# Patient Record
Sex: FEMALE | Race: WHITE | HISPANIC OR LATINO | Employment: OTHER | URBAN - METROPOLITAN AREA
[De-identification: names, ages, dates, MRNs, and addresses within clinical notes are randomized per-mention and may not be internally consistent; named-entity substitution may affect disease eponyms.]

---

## 2017-03-16 ENCOUNTER — APPOINTMENT (EMERGENCY)
Dept: RADIOLOGY | Facility: HOSPITAL | Age: 57
DRG: 113 | End: 2017-03-16
Payer: COMMERCIAL

## 2017-03-16 ENCOUNTER — HOSPITAL ENCOUNTER (INPATIENT)
Facility: HOSPITAL | Age: 57
LOS: 1 days | Discharge: LEFT BEFORE DISCHARGE | DRG: 113 | End: 2017-03-18
Attending: EMERGENCY MEDICINE | Admitting: INTERNAL MEDICINE
Payer: COMMERCIAL

## 2017-03-16 DIAGNOSIS — J06.9 VIRAL URI: Primary | ICD-10-CM

## 2017-03-16 DIAGNOSIS — R65.10 SIRS (SYSTEMIC INFLAMMATORY RESPONSE SYNDROME) (HCC): ICD-10-CM

## 2017-03-16 PROBLEM — G35 MS (MULTIPLE SCLEROSIS) (HCC): Status: ACTIVE | Noted: 2017-03-16

## 2017-03-16 PROBLEM — G43.909 MIGRAINE: Status: ACTIVE | Noted: 2017-03-16

## 2017-03-16 PROBLEM — R68.89 FLU-LIKE SYMPTOMS: Status: ACTIVE | Noted: 2017-03-16

## 2017-03-16 PROBLEM — J45.909 ASTHMA: Status: ACTIVE | Noted: 2017-03-16

## 2017-03-16 LAB
ANION GAP SERPL CALCULATED.3IONS-SCNC: 10 MMOL/L (ref 4–13)
ANION GAP SERPL CALCULATED.3IONS-SCNC: 7 MMOL/L (ref 4–13)
ATRIAL RATE: 99 BPM
BACTERIA UR QL AUTO: ABNORMAL /HPF
BASOPHILS # BLD AUTO: 0.01 THOUSANDS/ΜL (ref 0–0.1)
BASOPHILS NFR BLD AUTO: 0 % (ref 0–1)
BILIRUB UR QL STRIP: NEGATIVE
BUN SERPL-MCNC: 12 MG/DL (ref 5–25)
BUN SERPL-MCNC: 8 MG/DL (ref 5–25)
CALCIUM SERPL-MCNC: 7.9 MG/DL (ref 8.3–10.1)
CALCIUM SERPL-MCNC: 8.2 MG/DL (ref 8.3–10.1)
CHLORIDE SERPL-SCNC: 104 MMOL/L (ref 100–108)
CHLORIDE SERPL-SCNC: 112 MMOL/L (ref 100–108)
CLARITY UR: CLEAR
CO2 SERPL-SCNC: 24 MMOL/L (ref 21–32)
CO2 SERPL-SCNC: 25 MMOL/L (ref 21–32)
COLOR UR: YELLOW
COLOR, POC: YELLOW
CREAT SERPL-MCNC: 0.75 MG/DL (ref 0.6–1.3)
CREAT SERPL-MCNC: 0.84 MG/DL (ref 0.6–1.3)
EOSINOPHIL # BLD AUTO: 0.13 THOUSAND/ΜL (ref 0–0.61)
EOSINOPHIL NFR BLD AUTO: 2 % (ref 0–6)
ERYTHROCYTE [DISTWIDTH] IN BLOOD BY AUTOMATED COUNT: 12.9 % (ref 11.6–15.1)
ERYTHROCYTE [DISTWIDTH] IN BLOOD BY AUTOMATED COUNT: 13.1 % (ref 11.6–15.1)
FLUAV AG SPEC QL IA: NEGATIVE
FLUAV AG SPEC QL: DETECTED
FLUBV AG SPEC QL IA: NEGATIVE
FLUBV AG SPEC QL: ABNORMAL
GFR SERPL CREATININE-BSD FRML MDRD: >60 ML/MIN/1.73SQ M
GFR SERPL CREATININE-BSD FRML MDRD: >60 ML/MIN/1.73SQ M
GLUCOSE SERPL-MCNC: 106 MG/DL (ref 65–140)
GLUCOSE SERPL-MCNC: 115 MG/DL (ref 65–140)
GLUCOSE UR STRIP-MCNC: NEGATIVE MG/DL
HCT VFR BLD AUTO: 35.3 % (ref 34.8–46.1)
HCT VFR BLD AUTO: 40.8 % (ref 34.8–46.1)
HGB BLD-MCNC: 11.5 G/DL (ref 11.5–15.4)
HGB BLD-MCNC: 13.7 G/DL (ref 11.5–15.4)
HGB UR QL STRIP.AUTO: ABNORMAL
HYALINE CASTS #/AREA URNS LPF: ABNORMAL /LPF
KETONES UR STRIP-MCNC: NEGATIVE MG/DL
LACTATE SERPL-SCNC: 1.7 MMOL/L (ref 0.5–2)
LACTATE SERPL-SCNC: 2 MMOL/L (ref 0.5–2)
LEUKOCYTE ESTERASE UR QL STRIP: NEGATIVE
LYMPHOCYTES # BLD AUTO: 0.72 THOUSANDS/ΜL (ref 0.6–4.47)
LYMPHOCYTES NFR BLD AUTO: 8 % (ref 14–44)
MCH RBC QN AUTO: 28.1 PG (ref 26.8–34.3)
MCH RBC QN AUTO: 29 PG (ref 26.8–34.3)
MCHC RBC AUTO-ENTMCNC: 32.6 G/DL (ref 31.4–37.4)
MCHC RBC AUTO-ENTMCNC: 33.6 G/DL (ref 31.4–37.4)
MCV RBC AUTO: 86 FL (ref 82–98)
MCV RBC AUTO: 86 FL (ref 82–98)
MONOCYTES # BLD AUTO: 0.65 THOUSAND/ΜL (ref 0.17–1.22)
MONOCYTES NFR BLD AUTO: 7 % (ref 4–12)
NEUTROPHILS # BLD AUTO: 7.42 THOUSANDS/ΜL (ref 1.85–7.62)
NEUTS SEG NFR BLD AUTO: 83 % (ref 43–75)
NITRITE UR QL STRIP: NEGATIVE
NON-SQ EPI CELLS URNS QL MICRO: ABNORMAL /HPF
NRBC BLD AUTO-RTO: 0 /100 WBCS
P AXIS: 19 DEGREES
PH UR STRIP.AUTO: 6 [PH] (ref 4.5–8)
PLATELET # BLD AUTO: 156 THOUSANDS/UL (ref 149–390)
PLATELET # BLD AUTO: 228 THOUSANDS/UL (ref 149–390)
PMV BLD AUTO: 10.1 FL (ref 8.9–12.7)
PMV BLD AUTO: 9.6 FL (ref 8.9–12.7)
POTASSIUM SERPL-SCNC: 3.4 MMOL/L (ref 3.5–5.3)
POTASSIUM SERPL-SCNC: 3.8 MMOL/L (ref 3.5–5.3)
PR INTERVAL: 134 MS
PROT UR STRIP-MCNC: NEGATIVE MG/DL
QRS AXIS: 21 DEGREES
QRSD INTERVAL: 72 MS
QT INTERVAL: 338 MS
QTC INTERVAL: 433 MS
RBC # BLD AUTO: 4.09 MILLION/UL (ref 3.81–5.12)
RBC # BLD AUTO: 4.73 MILLION/UL (ref 3.81–5.12)
RBC #/AREA URNS AUTO: ABNORMAL /HPF
RSV B RNA SPEC QL NAA+PROBE: ABNORMAL
SODIUM SERPL-SCNC: 139 MMOL/L (ref 136–145)
SODIUM SERPL-SCNC: 143 MMOL/L (ref 136–145)
SP GR UR STRIP.AUTO: 1.02 (ref 1–1.03)
SPECIMEN SOURCE: NORMAL
T WAVE AXIS: 26 DEGREES
TROPONIN I BLD-MCNC: 0.01 NG/ML (ref 0–0.08)
UROBILINOGEN UR QL STRIP.AUTO: 0.2 E.U./DL
VENTRICULAR RATE: 99 BPM
WBC # BLD AUTO: 5.78 THOUSAND/UL (ref 4.31–10.16)
WBC # BLD AUTO: 8.95 THOUSAND/UL (ref 4.31–10.16)
WBC #/AREA URNS AUTO: ABNORMAL /HPF

## 2017-03-16 PROCEDURE — 87040 BLOOD CULTURE FOR BACTERIA: CPT | Performed by: EMERGENCY MEDICINE

## 2017-03-16 PROCEDURE — 96361 HYDRATE IV INFUSION ADD-ON: CPT

## 2017-03-16 PROCEDURE — 85027 COMPLETE CBC AUTOMATED: CPT | Performed by: HOSPITALIST

## 2017-03-16 PROCEDURE — 99285 EMERGENCY DEPT VISIT HI MDM: CPT

## 2017-03-16 PROCEDURE — 71020 HB CHEST X-RAY 2VW FRONTAL&LATL: CPT

## 2017-03-16 PROCEDURE — 83605 ASSAY OF LACTIC ACID: CPT | Performed by: EMERGENCY MEDICINE

## 2017-03-16 PROCEDURE — 87798 DETECT AGENT NOS DNA AMP: CPT | Performed by: EMERGENCY MEDICINE

## 2017-03-16 PROCEDURE — 96365 THER/PROPH/DIAG IV INF INIT: CPT

## 2017-03-16 PROCEDURE — 87400 INFLUENZA A/B EACH AG IA: CPT | Performed by: EMERGENCY MEDICINE

## 2017-03-16 PROCEDURE — 36415 COLL VENOUS BLD VENIPUNCTURE: CPT | Performed by: EMERGENCY MEDICINE

## 2017-03-16 PROCEDURE — 80048 BASIC METABOLIC PNL TOTAL CA: CPT | Performed by: HOSPITALIST

## 2017-03-16 PROCEDURE — 94760 N-INVAS EAR/PLS OXIMETRY 1: CPT | Performed by: SOCIAL WORKER

## 2017-03-16 PROCEDURE — 96375 TX/PRO/DX INJ NEW DRUG ADDON: CPT

## 2017-03-16 PROCEDURE — 81002 URINALYSIS NONAUTO W/O SCOPE: CPT | Performed by: EMERGENCY MEDICINE

## 2017-03-16 PROCEDURE — 81001 URINALYSIS AUTO W/SCOPE: CPT

## 2017-03-16 PROCEDURE — 84484 ASSAY OF TROPONIN QUANT: CPT

## 2017-03-16 PROCEDURE — 87086 URINE CULTURE/COLONY COUNT: CPT

## 2017-03-16 PROCEDURE — 93005 ELECTROCARDIOGRAM TRACING: CPT | Performed by: EMERGENCY MEDICINE

## 2017-03-16 PROCEDURE — 80048 BASIC METABOLIC PNL TOTAL CA: CPT | Performed by: EMERGENCY MEDICINE

## 2017-03-16 PROCEDURE — 85025 COMPLETE CBC W/AUTO DIFF WBC: CPT | Performed by: EMERGENCY MEDICINE

## 2017-03-16 RX ORDER — ALBUTEROL SULFATE 90 UG/1
2 AEROSOL, METERED RESPIRATORY (INHALATION) EVERY 4 HOURS PRN
Status: DISCONTINUED | OUTPATIENT
Start: 2017-03-16 | End: 2017-03-18 | Stop reason: HOSPADM

## 2017-03-16 RX ORDER — ONDANSETRON 2 MG/ML
4 INJECTION INTRAMUSCULAR; INTRAVENOUS EVERY 6 HOURS PRN
Status: DISCONTINUED | OUTPATIENT
Start: 2017-03-16 | End: 2017-03-18 | Stop reason: HOSPADM

## 2017-03-16 RX ORDER — KETOROLAC TROMETHAMINE 30 MG/ML
15 INJECTION, SOLUTION INTRAMUSCULAR; INTRAVENOUS ONCE
Status: COMPLETED | OUTPATIENT
Start: 2017-03-16 | End: 2017-03-16

## 2017-03-16 RX ORDER — DOCUSATE SODIUM 100 MG/1
100 CAPSULE, LIQUID FILLED ORAL 2 TIMES DAILY
Status: DISCONTINUED | OUTPATIENT
Start: 2017-03-16 | End: 2017-03-18 | Stop reason: HOSPADM

## 2017-03-16 RX ORDER — BUDESONIDE AND FORMOTEROL FUMARATE DIHYDRATE 160; 4.5 UG/1; UG/1
2 AEROSOL RESPIRATORY (INHALATION) 2 TIMES DAILY
Status: DISCONTINUED | OUTPATIENT
Start: 2017-03-16 | End: 2017-03-18 | Stop reason: HOSPADM

## 2017-03-16 RX ORDER — FLUTICASONE PROPIONATE 50 MCG
1 SPRAY, SUSPENSION (ML) NASAL DAILY
Status: DISCONTINUED | OUTPATIENT
Start: 2017-03-16 | End: 2017-03-18 | Stop reason: HOSPADM

## 2017-03-16 RX ORDER — OSELTAMIVIR PHOSPHATE 75 MG/1
75 CAPSULE ORAL EVERY 12 HOURS SCHEDULED
Status: DISCONTINUED | OUTPATIENT
Start: 2017-03-16 | End: 2017-03-18 | Stop reason: HOSPADM

## 2017-03-16 RX ORDER — NAPROXEN 500 MG/1
500 TABLET ORAL 2 TIMES DAILY WITH MEALS
Status: DISCONTINUED | OUTPATIENT
Start: 2017-03-16 | End: 2017-03-18 | Stop reason: HOSPADM

## 2017-03-16 RX ORDER — KETOROLAC TROMETHAMINE 30 MG/ML
15 INJECTION, SOLUTION INTRAMUSCULAR; INTRAVENOUS ONCE
Status: DISCONTINUED | OUTPATIENT
Start: 2017-03-16 | End: 2017-03-16

## 2017-03-16 RX ORDER — ACETAMINOPHEN 325 MG/1
650 TABLET ORAL EVERY 6 HOURS PRN
Status: DISCONTINUED | OUTPATIENT
Start: 2017-03-16 | End: 2017-03-18 | Stop reason: HOSPADM

## 2017-03-16 RX ORDER — ACETAMINOPHEN 325 MG/1
975 TABLET ORAL ONCE
Status: COMPLETED | OUTPATIENT
Start: 2017-03-16 | End: 2017-03-16

## 2017-03-16 RX ORDER — TRAZODONE HYDROCHLORIDE 50 MG/1
50 TABLET ORAL
Status: DISCONTINUED | OUTPATIENT
Start: 2017-03-16 | End: 2017-03-18 | Stop reason: HOSPADM

## 2017-03-16 RX ORDER — ONDANSETRON 2 MG/ML
4 INJECTION INTRAMUSCULAR; INTRAVENOUS ONCE
Status: COMPLETED | OUTPATIENT
Start: 2017-03-16 | End: 2017-03-16

## 2017-03-16 RX ORDER — SODIUM CHLORIDE 9 MG/ML
100 INJECTION, SOLUTION INTRAVENOUS CONTINUOUS
Status: DISPENSED | OUTPATIENT
Start: 2017-03-16 | End: 2017-03-16

## 2017-03-16 RX ORDER — ALBUTEROL SULFATE 2.5 MG/3ML
5 SOLUTION RESPIRATORY (INHALATION) ONCE
Status: COMPLETED | OUTPATIENT
Start: 2017-03-16 | End: 2017-03-16

## 2017-03-16 RX ORDER — SENNOSIDES 8.6 MG
1 TABLET ORAL DAILY
Status: DISCONTINUED | OUTPATIENT
Start: 2017-03-16 | End: 2017-03-18 | Stop reason: HOSPADM

## 2017-03-16 RX ORDER — GLATIRAMER 40 MG/ML
INJECTION, SOLUTION SUBCUTANEOUS 3 TIMES WEEKLY
Status: DISCONTINUED | OUTPATIENT
Start: 2017-03-16 | End: 2017-03-18 | Stop reason: HOSPADM

## 2017-03-16 RX ADMIN — SODIUM CHLORIDE 1000 ML: 0.9 INJECTION, SOLUTION INTRAVENOUS at 02:52

## 2017-03-16 RX ADMIN — BUDESONIDE AND FORMOTEROL FUMARATE DIHYDRATE 2 PUFF: 160; 4.5 AEROSOL RESPIRATORY (INHALATION) at 10:14

## 2017-03-16 RX ADMIN — ACETAMINOPHEN 975 MG: 325 TABLET, FILM COATED ORAL at 02:49

## 2017-03-16 RX ADMIN — ACETAMINOPHEN 650 MG: 325 TABLET, FILM COATED ORAL at 23:23

## 2017-03-16 RX ADMIN — ONDANSETRON 4 MG: 2 INJECTION INTRAMUSCULAR; INTRAVENOUS at 01:52

## 2017-03-16 RX ADMIN — OSELTAMIVIR PHOSPHATE 75 MG: 75 CAPSULE ORAL at 10:15

## 2017-03-16 RX ADMIN — BUDESONIDE AND FORMOTEROL FUMARATE DIHYDRATE 2 PUFF: 160; 4.5 AEROSOL RESPIRATORY (INHALATION) at 20:54

## 2017-03-16 RX ADMIN — OSELTAMIVIR PHOSPHATE 75 MG: 75 CAPSULE ORAL at 22:04

## 2017-03-16 RX ADMIN — TRAZODONE HYDROCHLORIDE 50 MG: 50 TABLET ORAL at 20:54

## 2017-03-16 RX ADMIN — THEOPHYLLINE ANHYDROUS 100 MG: 100 CAPSULE, EXTENDED RELEASE ORAL at 10:15

## 2017-03-16 RX ADMIN — ALBUTEROL SULFATE 5 MG: 2.5 SOLUTION RESPIRATORY (INHALATION) at 02:11

## 2017-03-16 RX ADMIN — SODIUM CHLORIDE 1000 ML: 0.9 INJECTION, SOLUTION INTRAVENOUS at 01:01

## 2017-03-16 RX ADMIN — ONDANSETRON 4 MG: 2 INJECTION INTRAMUSCULAR; INTRAVENOUS at 17:08

## 2017-03-16 RX ADMIN — AZITHROMYCIN MONOHYDRATE 500 MG: 500 INJECTION, POWDER, LYOPHILIZED, FOR SOLUTION INTRAVENOUS at 04:56

## 2017-03-16 RX ADMIN — KETOROLAC TROMETHAMINE 15 MG: 30 INJECTION, SOLUTION INTRAMUSCULAR at 01:54

## 2017-03-16 RX ADMIN — NAPROXEN 500 MG: 500 TABLET ORAL at 20:53

## 2017-03-16 RX ADMIN — NAPROXEN 500 MG: 500 TABLET ORAL at 07:32

## 2017-03-16 RX ADMIN — ACETAMINOPHEN 650 MG: 325 TABLET, FILM COATED ORAL at 16:51

## 2017-03-16 RX ADMIN — IPRATROPIUM BROMIDE 0.5 MG: 0.5 SOLUTION RESPIRATORY (INHALATION) at 02:12

## 2017-03-16 RX ADMIN — SODIUM CHLORIDE 100 ML/HR: 0.9 INJECTION, SOLUTION INTRAVENOUS at 06:28

## 2017-03-16 RX ADMIN — FLUTICASONE PROPIONATE 1 SPRAY: 50 SPRAY, METERED NASAL at 13:22

## 2017-03-17 LAB — BACTERIA UR CULT: NORMAL

## 2017-03-17 PROCEDURE — 94640 AIRWAY INHALATION TREATMENT: CPT

## 2017-03-17 PROCEDURE — 94760 N-INVAS EAR/PLS OXIMETRY 1: CPT

## 2017-03-17 RX ORDER — ALBUTEROL SULFATE 2.5 MG/3ML
2.5 SOLUTION RESPIRATORY (INHALATION) EVERY 4 HOURS PRN
Status: DISCONTINUED | OUTPATIENT
Start: 2017-03-17 | End: 2017-03-18 | Stop reason: HOSPADM

## 2017-03-17 RX ORDER — SUMATRIPTAN 50 MG/1
50 TABLET, FILM COATED ORAL ONCE
Status: COMPLETED | OUTPATIENT
Start: 2017-03-17 | End: 2017-03-17

## 2017-03-17 RX ORDER — IBUPROFEN 600 MG/1
600 TABLET ORAL EVERY 8 HOURS PRN
Status: DISCONTINUED | OUTPATIENT
Start: 2017-03-17 | End: 2017-03-18 | Stop reason: HOSPADM

## 2017-03-17 RX ORDER — SODIUM CHLORIDE FOR INHALATION 0.9 %
3 VIAL, NEBULIZER (ML) INHALATION EVERY 4 HOURS PRN
Status: DISCONTINUED | OUTPATIENT
Start: 2017-03-17 | End: 2017-03-18 | Stop reason: HOSPADM

## 2017-03-17 RX ORDER — POTASSIUM CHLORIDE 20 MEQ/1
40 TABLET, EXTENDED RELEASE ORAL ONCE
Status: COMPLETED | OUTPATIENT
Start: 2017-03-17 | End: 2017-03-17

## 2017-03-17 RX ADMIN — THEOPHYLLINE ANHYDROUS 100 MG: 100 CAPSULE, EXTENDED RELEASE ORAL at 10:01

## 2017-03-17 RX ADMIN — IBUPROFEN 600 MG: 600 TABLET ORAL at 10:32

## 2017-03-17 RX ADMIN — NAPROXEN 500 MG: 500 TABLET ORAL at 18:07

## 2017-03-17 RX ADMIN — BUDESONIDE AND FORMOTEROL FUMARATE DIHYDRATE 2 PUFF: 160; 4.5 AEROSOL RESPIRATORY (INHALATION) at 21:25

## 2017-03-17 RX ADMIN — ISODIUM CHLORIDE 3 ML: 0.03 SOLUTION RESPIRATORY (INHALATION) at 00:55

## 2017-03-17 RX ADMIN — OSELTAMIVIR PHOSPHATE 75 MG: 75 CAPSULE ORAL at 08:30

## 2017-03-17 RX ADMIN — SENNOSIDES 8.6 MG: 8.6 TABLET, FILM COATED ORAL at 08:30

## 2017-03-17 RX ADMIN — NAPROXEN 500 MG: 500 TABLET ORAL at 08:30

## 2017-03-17 RX ADMIN — BUDESONIDE AND FORMOTEROL FUMARATE DIHYDRATE 2 PUFF: 160; 4.5 AEROSOL RESPIRATORY (INHALATION) at 08:30

## 2017-03-17 RX ADMIN — ONDANSETRON 4 MG: 2 INJECTION INTRAMUSCULAR; INTRAVENOUS at 00:15

## 2017-03-17 RX ADMIN — ENOXAPARIN SODIUM 40 MG: 40 INJECTION SUBCUTANEOUS at 08:30

## 2017-03-17 RX ADMIN — OSELTAMIVIR PHOSPHATE 75 MG: 75 CAPSULE ORAL at 21:28

## 2017-03-17 RX ADMIN — ALBUTEROL SULFATE 2.5 MG: 2.5 SOLUTION RESPIRATORY (INHALATION) at 01:20

## 2017-03-17 RX ADMIN — DOCUSATE SODIUM 100 MG: 100 CAPSULE, LIQUID FILLED ORAL at 08:30

## 2017-03-17 RX ADMIN — SUMATRIPTAN SUCCINATE 50 MG: 50 TABLET ORAL at 00:57

## 2017-03-17 RX ADMIN — TRAZODONE HYDROCHLORIDE 50 MG: 50 TABLET ORAL at 21:27

## 2017-03-17 RX ADMIN — ALBUTEROL SULFATE 2.5 MG: 2.5 SOLUTION RESPIRATORY (INHALATION) at 20:58

## 2017-03-17 RX ADMIN — DOCUSATE SODIUM 100 MG: 100 CAPSULE, LIQUID FILLED ORAL at 18:07

## 2017-03-17 RX ADMIN — POTASSIUM CHLORIDE 40 MEQ: 1500 TABLET, EXTENDED RELEASE ORAL at 12:26

## 2017-03-18 VITALS
BODY MASS INDEX: 31.26 KG/M2 | SYSTOLIC BLOOD PRESSURE: 130 MMHG | HEIGHT: 61 IN | OXYGEN SATURATION: 96 % | RESPIRATION RATE: 19 BRPM | HEART RATE: 87 BPM | WEIGHT: 165.57 LBS | DIASTOLIC BLOOD PRESSURE: 66 MMHG | TEMPERATURE: 97.9 F

## 2017-03-21 ENCOUNTER — ALLSCRIPTS OFFICE VISIT (OUTPATIENT)
Dept: OTHER | Facility: OTHER | Age: 57
End: 2017-03-21

## 2017-03-21 LAB
BACTERIA BLD CULT: NORMAL
BACTERIA BLD CULT: NORMAL

## 2017-03-22 ENCOUNTER — ALLSCRIPTS OFFICE VISIT (OUTPATIENT)
Dept: OTHER | Facility: OTHER | Age: 57
End: 2017-03-22

## 2017-03-22 ENCOUNTER — GENERIC CONVERSION - ENCOUNTER (OUTPATIENT)
Dept: OTHER | Facility: OTHER | Age: 57
End: 2017-03-22

## 2017-03-22 DIAGNOSIS — Z12.31 ENCOUNTER FOR SCREENING MAMMOGRAM FOR MALIGNANT NEOPLASM OF BREAST: ICD-10-CM

## 2017-03-29 ENCOUNTER — GENERIC CONVERSION - ENCOUNTER (OUTPATIENT)
Dept: OTHER | Facility: OTHER | Age: 57
End: 2017-03-29

## 2017-03-30 ENCOUNTER — GENERIC CONVERSION - ENCOUNTER (OUTPATIENT)
Dept: OTHER | Facility: OTHER | Age: 57
End: 2017-03-30

## 2017-05-08 ENCOUNTER — APPOINTMENT (OUTPATIENT)
Dept: LAB | Facility: HOSPITAL | Age: 57
End: 2017-05-08
Payer: COMMERCIAL

## 2017-05-08 ENCOUNTER — ALLSCRIPTS OFFICE VISIT (OUTPATIENT)
Dept: OTHER | Facility: OTHER | Age: 57
End: 2017-05-08

## 2017-05-08 DIAGNOSIS — J02.9 ACUTE PHARYNGITIS: ICD-10-CM

## 2017-05-08 LAB — S PYO AG THROAT QL: NEGATIVE

## 2017-05-08 PROCEDURE — 87070 CULTURE OTHR SPECIMN AEROBIC: CPT

## 2017-05-10 LAB — BACTERIA THROAT CULT: NORMAL

## 2017-07-31 ENCOUNTER — ALLSCRIPTS OFFICE VISIT (OUTPATIENT)
Dept: OTHER | Facility: OTHER | Age: 57
End: 2017-07-31

## 2017-07-31 DIAGNOSIS — J44.9 CHRONIC OBSTRUCTIVE PULMONARY DISEASE (HCC): ICD-10-CM

## 2017-07-31 DIAGNOSIS — G35 MULTIPLE SCLEROSIS (HCC): ICD-10-CM

## 2017-07-31 DIAGNOSIS — F41.9 ANXIETY DISORDER: ICD-10-CM

## 2017-08-31 ENCOUNTER — APPOINTMENT (OUTPATIENT)
Dept: LAB | Facility: HOSPITAL | Age: 57
End: 2017-08-31
Payer: COMMERCIAL

## 2017-08-31 ENCOUNTER — TRANSCRIBE ORDERS (OUTPATIENT)
Dept: LAB | Facility: HOSPITAL | Age: 57
End: 2017-08-31

## 2017-08-31 DIAGNOSIS — G35 MULTIPLE SCLEROSIS (HCC): ICD-10-CM

## 2017-08-31 DIAGNOSIS — F41.9 ANXIETY DISORDER: ICD-10-CM

## 2017-08-31 DIAGNOSIS — J44.9 CHRONIC OBSTRUCTIVE PULMONARY DISEASE (HCC): ICD-10-CM

## 2017-08-31 LAB
ALBUMIN SERPL BCP-MCNC: 3.1 G/DL (ref 3.5–5)
ALP SERPL-CCNC: 72 U/L (ref 46–116)
ALT SERPL W P-5'-P-CCNC: 32 U/L (ref 12–78)
ANION GAP SERPL CALCULATED.3IONS-SCNC: 9 MMOL/L (ref 4–13)
AST SERPL W P-5'-P-CCNC: 17 U/L (ref 5–45)
BILIRUB SERPL-MCNC: 0.49 MG/DL (ref 0.2–1)
BUN SERPL-MCNC: 14 MG/DL (ref 5–25)
CALCIUM SERPL-MCNC: 8.9 MG/DL (ref 8.3–10.1)
CHLORIDE SERPL-SCNC: 106 MMOL/L (ref 100–108)
CHOLEST SERPL-MCNC: 222 MG/DL (ref 50–200)
CO2 SERPL-SCNC: 25 MMOL/L (ref 21–32)
CREAT SERPL-MCNC: 0.79 MG/DL (ref 0.6–1.3)
GFR SERPL CREATININE-BSD FRML MDRD: 85 ML/MIN/1.73SQ M
GLUCOSE P FAST SERPL-MCNC: 99 MG/DL (ref 65–99)
HDLC SERPL-MCNC: 71 MG/DL (ref 40–60)
LDLC SERPL CALC-MCNC: 134 MG/DL (ref 0–100)
POTASSIUM SERPL-SCNC: 4.1 MMOL/L (ref 3.5–5.3)
PROT SERPL-MCNC: 7.2 G/DL (ref 6.4–8.2)
SODIUM SERPL-SCNC: 140 MMOL/L (ref 136–145)
TRIGL SERPL-MCNC: 84 MG/DL
TSH SERPL DL<=0.05 MIU/L-ACNC: 1.13 UIU/ML (ref 0.36–3.74)

## 2017-08-31 PROCEDURE — 80061 LIPID PANEL: CPT

## 2017-08-31 PROCEDURE — 80053 COMPREHEN METABOLIC PANEL: CPT

## 2017-08-31 PROCEDURE — 84443 ASSAY THYROID STIM HORMONE: CPT

## 2017-08-31 PROCEDURE — 36415 COLL VENOUS BLD VENIPUNCTURE: CPT

## 2017-09-01 ENCOUNTER — ALLSCRIPTS OFFICE VISIT (OUTPATIENT)
Dept: OTHER | Facility: OTHER | Age: 57
End: 2017-09-01

## 2017-09-01 DIAGNOSIS — Z12.31 ENCOUNTER FOR SCREENING MAMMOGRAM FOR MALIGNANT NEOPLASM OF BREAST: ICD-10-CM

## 2017-11-13 ENCOUNTER — GENERIC CONVERSION - ENCOUNTER (OUTPATIENT)
Dept: FAMILY MEDICINE CLINIC | Facility: CLINIC | Age: 57
End: 2017-11-13

## 2017-11-14 ENCOUNTER — TRANSCRIBE ORDERS (OUTPATIENT)
Dept: ADMINISTRATIVE | Facility: HOSPITAL | Age: 57
End: 2017-11-14

## 2017-11-14 ENCOUNTER — GENERIC CONVERSION - ENCOUNTER (OUTPATIENT)
Dept: OTHER | Facility: OTHER | Age: 57
End: 2017-11-14

## 2017-11-14 ENCOUNTER — ALLSCRIPTS OFFICE VISIT (OUTPATIENT)
Dept: OTHER | Facility: OTHER | Age: 57
End: 2017-11-14

## 2017-11-14 DIAGNOSIS — G35 MULTIPLE SCLEROSIS (HCC): Primary | ICD-10-CM

## 2017-11-14 DIAGNOSIS — G35 MULTIPLE SCLEROSIS (HCC): ICD-10-CM

## 2017-12-28 ENCOUNTER — GENERIC CONVERSION - ENCOUNTER (OUTPATIENT)
Dept: OTHER | Facility: OTHER | Age: 57
End: 2017-12-28

## 2017-12-28 LAB — COLOGUARD (HISTORICAL): NORMAL

## 2018-01-10 NOTE — PROGRESS NOTES
History of Present Illness  Care Coordination Encounter Information:   Type of Encounter: Telephonic    Spoke to  3/29/17 attempted to outreach patient unable to leave voicemail I will try again tomorrow  Active Problems    1  Acute bronchitis (466 0) (J20 9)   2  Acute upper respiratory infection (465 9) (J06 9)   3  Anxiety (300 00) (F41 9)   4  Asthma with COPD (493 20) (J44 9,J45 909)   5  Backache (724 5) (M54 9)   6  Depression screening (V79 0) (Z13 89)   7  Dyspnea (786 09) (R06 00)   8  Encounter for screening colonoscopy (V76 51) (Z12 11)   9  Fibromyalgia (729 1) (M79 7)   10  Influenza (487 1) (J11 1)   11  Multiple sclerosis, relapsing-remitting (340) (G35)   12  Need for pneumococcal vaccination (V03 82) (Z23)   13  Need for prophylactic vaccination and inoculation against influenza (V04 81) (Z23)   14  Need for Tdap vaccination (V06 1) (Z23)   15  Other screening mammogram (V76 12) (Z12 31)   16  Screen for colon cancer (V76 51) (Z12 11)   17  Women's annual routine gynecological examination (V72 31) (Z01 419)    Past Medical History    1  History of Ankle pain, unspecified laterality   2  History of Anxiety (300 00) (F41 9)   3  History of Asthma (493 90) (J45 909)   4  History of Asthma with acute exacerbation (493 92) (J45 901)   5  Asthma with COPD (493 20) (J44 9,J45 909)   6  History of Chronic rhinitis (472 0) (J31 0)   7  Cough (786 2) (R05)   8  Dyspnea (786 09) (R06 00)   9  History of Elbow pain, unspecified laterality (719 42) (M25 529)   10  History of acute bronchitis (V12 69) (Z87 09)   11  History of gastroenteritis (V12 79) (Z87 19)   12  History of lipoma (V13 89) (Z86 018)   13  History of low back pain (V13 59) (Z87 39)   14  History of migraine (V12 49) (Z86 69)   15  History of obesity (V13 89) (Z86 39)   16  History of shortness of breath (V13 89) (Z87 898)   17  History of sinusitis (V12 69) (Z87 09)   18  History of urticaria (V13 3) (Z87 2)   19   History of Influenza due to unidentified influenza virus with other respiratory manifestation    (487 1) (J11 1)   20  History of Laceration Of Finger (883 0)   21  History of Multiple sclerosis (340) (G35)   22  History of Multiple sclerosis (340) (G35)   23  Multiple sclerosis, relapsing-remitting (340) (G35)   24  History of Murmur (785 2) (R01 1)   25  History of Obstructive sleep apnea (327 23) (G47 33)   26  History of Osteoarthritis (V13 4)   27  History of Post traumatic stress disorder (309 81) (F43 10)   28  History of Septic Tenosynovitis Of The Left Thumb (727 89)   29  History of Weight gain (783 1) (R63 5)    Surgical History    1  History of Knee Arthroscopy With Medial Meniscus Repair    Family History  Mother    1  Family history of Hepatitis, C Virus  Father    2  Family history of Asthma (V17 5)   3  Family history of Chronic Obstructive Pulmonary Disease   4  Family history of Diabetes Mellitus (V18 0)   5  Family history of Hypertension (V17 49)  Brother    6  Family history of Asthma (V17 5)    Social History    · Daily Coffee Consumption (1  Cups/Day)   · Disabled   ·    · Former smoker (F33 96) (Z64 055)   · Evangelical   · No alcohol use   · No drug use   · No living will   · Spouse/family support    Current Meds    1  Benzonatate 200 MG Oral Capsule; TAKE 1 CAPSULE 2-3 TIMES DAILY; Therapy: 96NJI6193 to (Evaluate:32Tdo6673)  Requested for: 11MWO8012; Last   Rx:86Plk7048 Ordered   2  Proventil  (90 Base) MCG/ACT Inhalation Aerosol Solution; INHALE 1-2 PUFFS   EVERY 4-6 HOURS AS NEEDED AND AS DIRECTED; Therapy: 14ROI7721 to (Evaluate:22Apr2017)  Requested for: 03NRR6485; Last   Rx:23Mar2017 Ordered    3  LORazepam 0 5 MG Oral Tablet; TAKE 1 TABLET EVERY 12 HOURS AS NEEDED FOR   ANXIETY; Therapy: 89XYS9761 to (Last Rx:00Lhw7271)  Requested for: 97XJB2619 Ordered    4   PredniSONE 10 MG Oral Tablet; 3 tabs BID X 2 days, 2 Tabs BID X 2 days, 1 Tab BID   X 2 Days, then 1 Tab daily X 2 days; Therapy: 36YHZ7932 to (Last Rx:22Mar2017)  Requested for: 22Mar2017 Ordered   5  Symbicort 160-4 5 MCG/ACT Inhalation Aerosol; INHALE 2 PUFFS BY MOUTH TWICE   DAILY RINSE MOUTH AFTER RINSE; Therapy: 48VXB5222 to (Evaluate:28Oct2016)  Requested for: 12SVL9335; Last   Rx:58Ovw1972 Ordered   6  Benjamín-24 200 MG Oral Capsule Extended Release 24 Hour; take 1 capsule daily; Therapy: 57JUN8397 to (QKEKMAEP:79XBA6349)  Requested for: 73JTW2433; Last   Rx:31Jan2017 Ordered    7  Albuterol Sulfate (2 5 MG/3ML) 0 083% Inhalation Nebulization Solution; USE AS   DIRECTED; Therapy: 85Tvh3512 to (Last Rx:22Apr2014)  Requested for: 22Apr2014 Ordered    8  SUMAtriptan Succinate 100 MG Oral Tablet; TAKE 1 TABLET AT ONSET OF MIGRAINE   HEADACHE  MAY REPEAT IN 2 HOURS IFNEEDED; Therapy: 85OGY2131 to (Evaluate:17Mar2016)  Requested for: 09INO8142; Last   Rx:10Sla8815 Ordered    9  Copaxone 20 MG/ML Subcutaneous Solution Prefilled Syringe; 3 times a week; Therapy: (Recorded:33Tii9494) to Recorded   10  Copaxone 40 MG/ML Subcutaneous Solution Prefilled Syringe; Therapy: 11PGG4132 to Recorded   11  Hydrocodone-Acetaminophen 7 5-325 MG Oral Tablet; Therapy: 60THP3503 to Recorded   12  Oxybutynin Chloride ER 5 MG Oral Tablet Extended Release 24 Hour; Therapy: 73ACU4639 to Recorded   13  TiZANidine HCl - 2 MG Oral Tablet; Therapy: 26XDS3484 to Recorded   14  TraZODone HCl - 100 MG Oral Tablet; Therapy: 20BRZ5143 to Recorded   15  TraZODone HCl - 150 MG Oral Tablet; TAKE 2 TABLETS AT BEDTIME; Therapy: (Recorded:53Sxb9943) to Recorded   16  Vitamin D3 46720 UNIT Oral Capsule; Therapy: 96SLQ2538 to Recorded    Allergies    1  No Known Drug Allergies    Health Management   Digital Bilateral Screening Mammogram With CAD; every 1 year; Next Due: 93VNG2894; Overdue   COLONOSCOPY; every 10 years; Next Due: 94BUX2140; Overdue    End of Encounter Meds    1   Benzonatate 200 MG Oral Capsule; TAKE 1 CAPSULE 2-3 TIMES DAILY; Therapy: 95YYF0599 to (Evaluate:29Sep2016)  Requested for: 17VVS0925; Last   Rx:24Sep2016 Ordered   2  Proventil  (90 Base) MCG/ACT Inhalation Aerosol Solution; INHALE 1-2 PUFFS   EVERY 4-6 HOURS AS NEEDED AND AS DIRECTED; Therapy: 66QOK4380 to (Evaluate:22Apr2017)  Requested for: 92PDH6396; Last   Rx:23Mar2017 Ordered    3  LORazepam 0 5 MG Oral Tablet; TAKE 1 TABLET EVERY 12 HOURS AS NEEDED FOR   ANXIETY; Therapy: 20WFZ1767 to (Last Rx:10Feb2016)  Requested for: 93WBR5392 Ordered    4  PredniSONE 10 MG Oral Tablet; 3 tabs BID X 2 days, 2 Tabs BID X 2 days, 1 Tab BID   X 2 Days, then 1 Tab daily X 2 days; Therapy: 58BDD8206 to (Last Rx:22Mar2017)  Requested for: 22Mar2017 Ordered   5  Symbicort 160-4 5 MCG/ACT Inhalation Aerosol; INHALE 2 PUFFS BY MOUTH TWICE   DAILY RINSE MOUTH AFTER RINSE; Therapy: 39XZX6007 to (Evaluate:28Oct2016)  Requested for: 54ARI6611; Last   Rx:30Jun2016 Ordered   6  Benjamín-24 200 MG Oral Capsule Extended Release 24 Hour; take 1 capsule daily; Therapy: 11IXO6940 to (CQPQUT:28DRU9599)  Requested for: 62IVF4730; Last   Rx:31Jan2017 Ordered    7  Albuterol Sulfate (2 5 MG/3ML) 0 083% Inhalation Nebulization Solution; USE AS   DIRECTED; Therapy: 39Rtf1592 to (Last Rx:22Apr2014)  Requested for: 22Apr2014 Ordered    8  SUMAtriptan Succinate 100 MG Oral Tablet; TAKE 1 TABLET AT ONSET OF MIGRAINE   HEADACHE  MAY REPEAT IN 2 HOURS IFNEEDED; Therapy: 21QZZ0737 to (Evaluate:17Mar2016)  Requested for: 81CDY9170; Last   Rx:10Feb2016 Ordered    9  Copaxone 20 MG/ML Subcutaneous Solution Prefilled Syringe; 3 times a week; Therapy: (Recorded:40Cdt3749) to Recorded   10  Copaxone 40 MG/ML Subcutaneous Solution Prefilled Syringe; Therapy: 72MFY5176 to Recorded   11  Hydrocodone-Acetaminophen 7 5-325 MG Oral Tablet; Therapy: 44TQD3452 to Recorded   12  Oxybutynin Chloride ER 5 MG Oral Tablet Extended Release 24 Hour; Therapy: 52SDP5900 to Recorded   13   TiZANidine HCl - 2 MG Oral Tablet; Therapy: 10TQO0258 to Recorded   14  TraZODone HCl - 100 MG Oral Tablet; Therapy: 37KRF4317 to Recorded   15  TraZODone HCl - 150 MG Oral Tablet; TAKE 2 TABLETS AT BEDTIME; Therapy: (Recorded:27Qlf4716) to Recorded   16  Vitamin D3 64100 UNIT Oral Capsule; Therapy: 08Ubl6660 to Recorded    Patient Care Team    Care Team Member Role Specialty Office Number   Steven HERNANDEZ   Specialist Pulmonary Medicine [phone]     Signatures   Electronically signed by : Cherelle Perdomo RN; Mar 29 2017  1:21PM EST                       (Author)

## 2018-01-10 NOTE — MISCELLANEOUS
Provider Comments  Provider Comments:   Dear Deidre Kearns,    You missed an appointment on 11/16/2016 at 2:00PM  We understand that many situations arise that occasionally prevents patients from keeping scheduled appointments  It is the policy of [facility] that patients notify us 24 hours in advance if unable to keep a scheduled appointment  Missed appointments jeopardize strong physician-patient relationships  The appointment you missed could have easily been made available to another patient if you had contacted us to cancel  We like to accommodate all of our patients, but when patients miss an appointment it prevents us from being able to help everyone  In the future, we request at least 24 hours notice of cancellation so we can make your appointment available to someone else in need         Sincerely,    The Physicians and Staff of [facility]        Signatures   Electronically signed by : NANCY Alejo ; Nov 17 2016 11:50AM EST                       (Author)

## 2018-01-13 VITALS
OXYGEN SATURATION: 96 % | DIASTOLIC BLOOD PRESSURE: 64 MMHG | TEMPERATURE: 98 F | WEIGHT: 168.25 LBS | SYSTOLIC BLOOD PRESSURE: 112 MMHG | RESPIRATION RATE: 20 BRPM | HEART RATE: 88 BPM | HEIGHT: 61 IN | BODY MASS INDEX: 31.77 KG/M2

## 2018-01-13 VITALS
HEART RATE: 92 BPM | HEIGHT: 61 IN | DIASTOLIC BLOOD PRESSURE: 80 MMHG | RESPIRATION RATE: 20 BRPM | WEIGHT: 168 LBS | TEMPERATURE: 98.1 F | OXYGEN SATURATION: 96 % | SYSTOLIC BLOOD PRESSURE: 110 MMHG | BODY MASS INDEX: 31.72 KG/M2

## 2018-01-14 VITALS
BODY MASS INDEX: 33.23 KG/M2 | SYSTOLIC BLOOD PRESSURE: 112 MMHG | HEART RATE: 70 BPM | HEIGHT: 61 IN | RESPIRATION RATE: 18 BRPM | WEIGHT: 176 LBS | OXYGEN SATURATION: 97 % | DIASTOLIC BLOOD PRESSURE: 72 MMHG | TEMPERATURE: 98 F

## 2018-01-14 VITALS
DIASTOLIC BLOOD PRESSURE: 74 MMHG | RESPIRATION RATE: 20 BRPM | BODY MASS INDEX: 33.79 KG/M2 | HEIGHT: 61 IN | SYSTOLIC BLOOD PRESSURE: 110 MMHG | WEIGHT: 179 LBS | TEMPERATURE: 98.8 F | OXYGEN SATURATION: 98 % | HEART RATE: 75 BPM

## 2018-01-15 NOTE — MISCELLANEOUS
Assessment    1  Influenza (487 1) (J11 1)   2  Asthma with COPD (493 20) (J44 9,J45 909)    Plan  Acute bronchitis    · Proventil  (90 Base) MCG/ACT Inhalation Aerosol Solution; INHALE 1-2  PUFFS EVERY 4-6 HOURS AS NEEDED AND AS DIRECTED   Rx By: Jeyson Carrasco; Dispense: 30 Days ; #:1 GM; Refill: 0; For: Acute bronchitis; YI = N; Verified Transmission to [facility] #[id number] Asthma with COPD    · PredniSONE 10 MG Oral Tablet; 3 tabs BID X 2 days, 2 Tabs BID X 2 days, 1 Tab  BID X 2 Days, then 1 Tab daily X 2 days   Rx By: Jeyson Carrasco; Dispense: 0 Days ; #:26 Tablet; Refill: 0; For: Asthma with COPD; YI = N; Verified Transmission to [facility]ARM[facility]Y #[id number] Last Updated By: System, [employer]; 3/22/2017 4:48:14 PM   · Nebulizer Supplies; Status:Complete;   Done: 80YXU4185   Perform:Not Applicable; Order Comments:for mask and tubing; LMP:08PII1333;AWXMTYJ; For:Asthma with COPD; Ordered By:Behler, Sharion Santos;  Depression screening    · *VB-Depression Screening; Status:Complete;   Done: 35WTX9519 04:00PM   Performed: In Office; [phone]; Ordered; For:Depression screening; Ordered By:Behler, Sharion Santos; Discussion/Summary  Discussion Summary:   1  Influenza is resolving  Patient to take prednisone taper as directed  Use nebulizer  FU if symptoms not improving  Medication SE Review and Pt Understands Tx: Possible side effects of new medications were reviewed with the patient/guardian today  The treatment plan was reviewed with the patient/guardian  The patient/guardian understands and agrees with the treatment plan   Self Referrals:   Self Referrals: No      History of Present Illness  TCM Communication [facility]: The patient is being contacted for follow-up after hospitalization  Hospital records were reviewed  She was hospitalized at  The dates of hospitalization: 03/16/2017-03/17/2017, date of admission: 03/16/2017, date of discharge: 03/17/2017  Diagnosis: Influenza  She was discharged to home  Symptoms: weakness, fatigue and cough  Counseling was provided to the patient  Topics counseled included instructions for management, risk factor reductions, prognosis, patient and family education, activities of daily living and importance of compliance with treatment  Communication performed and completed by A Billig RMA   HPI: 49year old female here for FU after hospital admission for influenza and COPD exacerbation  She spent 2 nights in the hospital and then left AMA to go home because she thought something was wrong with her son  She has been using her inhalers as prescribed and is feeling a little bit better but has ongoing cough  Denies fever or chills  Review of Systems  Complete-Female:   Constitutional: No fever, no chills, feels well, no tiredness, no recent weight gain or weight loss  ENT: no complaints of earache, no loss of hearing, no nose bleeds, no nasal discharge, no sore throat, no hoarseness  Cardiovascular: No complaints of slow heart rate, no fast heart rate, no chest pain, no palpitations, no leg claudication, no lower extremity edema  Respiratory: cough and wheezing, but as noted in HPI  Gastrointestinal: No complaints of abdominal pain, no constipation, no nausea or vomiting, no diarrhea, no bloody stools  Genitourinary: No complaints of dysuria, no incontinence, no pelvic pain, no dysmenorrhea, no vaginal discharge or bleeding  Musculoskeletal: No complaints of arthralgias, no myalgias, no joint swelling or stiffness, no limb pain or swelling  ROS Reviewed:   ROS reviewed  Active Problems    1  Acute bronchitis (466 0) (J20 9)   2  Acute upper respiratory infection (465 9) (J06 9)   3  Anxiety (300 00) (F41 9)   4  Asthma with COPD (493 20) (J44 9,J45 909)   5  Backache (724 5) (M54 9)   6  Dyspnea (786 09) (R06 00)   7  Encounter for screening colonoscopy (V76 51) (Z12 11)   8  Fibromyalgia (729 1) (M79 7)   9   Multiple sclerosis, relapsing-remitting (340) (G35)   10  Need for pneumococcal vaccination (V03 82) (Z23)   11  Need for prophylactic vaccination and inoculation against influenza (V04 81) (Z23)   12  Need for Tdap vaccination (V06 1) (Z23)   13  Other screening mammogram (V76 12) (Z12 31)   14  Screen for colon cancer (V76 51) (Z12 11)    Past Medical History    1  History of Ankle pain, unspecified laterality   2  History of Anxiety (300 00) (F41 9)   3  History of Asthma (493 90) (J45 909)   4  History of Asthma with acute exacerbation (493 92) (J45 901)   5  Asthma with COPD (493 20) (J44 9,J45 909)   6  History of Chronic rhinitis (472 0) (J31 0)   7  Cough (786 2) (R05)   8  Dyspnea (786 09) (R06 00)   9  History of Elbow pain, unspecified laterality (719 42) (M25 529)   10  History of acute bronchitis (V12 69) (Z87 09)   11  History of gastroenteritis (V12 79) (Z87 19)   12  History of lipoma (V13 89) (Z86 018)   13  History of low back pain (V13 59) (Z87 39)   14  History of migraine (V12 49) (Z86 69)   15  History of obesity (V13 89) (Z86 39)   16  History of shortness of breath (V13 89) (Z87 898)   17  History of sinusitis (V12 69) (Z87 09)   18  History of urticaria (V13 3) (Z87 2)   19  History of Influenza due to unidentified influenza virus with other respiratory manifestation    (487 1) (J11 1)   20  History of Laceration Of Finger (883 0)   21  History of Multiple sclerosis (340) (G35)   22  History of Multiple sclerosis (340) (G35)   23  Multiple sclerosis, relapsing-remitting (340) (G35)   24  History of Murmur (785 2) (R01 1)   25  History of Obstructive sleep apnea (327 23) (G47 33)   26  History of Osteoarthritis (V13 4)   27  History of Post traumatic stress disorder (309 81) (F43 10)   28  History of Septic Tenosynovitis Of The Left Thumb (727 89)   29  History of Weight gain (783 1) (R63 5)    Surgical History    1  History of Knee Arthroscopy With Medial Meniscus Repair  Surgical History Reviewed:    The surgical history was reviewed and updated today  Family History  Mother    1  Family history of Hepatitis, C Virus  Father    2  Family history of Asthma (V17 5)   3  Family history of Chronic Obstructive Pulmonary Disease   4  Family history of Diabetes Mellitus (V18 0)   5  Family history of Hypertension (V17 49)  Brother    6  Family history of Asthma (V17 5)  Family History Reviewed: The family history was reviewed and updated today  Social History    · Daily Coffee Consumption (1  Cups/Day)   · Disabled   ·    · Former smoker (M11 62) (M68 552)   · Yarsani   · No alcohol use   · No drug use   · No living will   · Spouse/family support  Social History Reviewed: The social history was reviewed and updated today  The social history was reviewed and is unchanged  Current Meds   1  Albuterol Sulfate (2 5 MG/3ML) 0 083% Inhalation Nebulization Solution; USE AS   DIRECTED; Therapy: 47Dis5137 to (Last Rx:53Mcf2666)  Requested for: 67Nud1734 Ordered   2  Benzonatate 200 MG Oral Capsule; TAKE 1 CAPSULE 2-3 TIMES DAILY; Therapy: 38FRA3427 to (Evaluate:77Qpi0988)  Requested for: 91IBZ0910; Last   Rx:28Dzc9885 Ordered   3  Copaxone 20 MG/ML Subcutaneous Solution Prefilled Syringe; 3 times a week; Therapy: (Recorded:68Jzm9412) to Recorded   4  Copaxone 40 MG/ML Subcutaneous Solution Prefilled Syringe; Therapy: 45IEY9925 to Recorded   5  Hydrocodone-Acetaminophen 7 5-325 MG Oral Tablet; Therapy: 07QGK8040 to Recorded   6  LORazepam 0 5 MG Oral Tablet; TAKE 1 TABLET EVERY 12 HOURS AS NEEDED FOR   ANXIETY; Therapy: 00PXU1851 to (Last Rx:55Xai5387)  Requested for: 85XRJ7248 Ordered   7  Proventil  (90 Base) MCG/ACT Inhalation Aerosol Solution; INHALE 1-2 PUFFS   EVERY 4-6 HOURS AS NEEDED AND AS DIRECTED; Therapy: 79OTP0705 to (01 72 64 30 83)  Requested for: 74HYD2988; Last   Rx:26Uty2025 Ordered   8  SUMAtriptan Succinate 100 MG Oral Tablet; TAKE 1 TABLET AT ONSET OF MIGRAINE   HEADACHE   MAY REPEAT IN 2 HOURS IFNEEDED; Therapy: 72DLW8772 to (Evaluate:17Mar2016)  Requested for: 45YLR0096; Last   Rx:75Ryr3663 Ordered   9  Symbicort 160-4 5 MCG/ACT Inhalation Aerosol; INHALE 2 PUFFS BY MOUTH TWICE   DAILY RINSE MOUTH AFTER RINSE; Therapy: 99TGY3497 to (Evaluate:28Oct2016)  Requested for: 53VTC4333; Last   Rx:30Jun2016 Ordered   10  Benjamín-24 200 MG Oral Capsule Extended Release 24 Hour; take 1 capsule daily; Therapy: 19BSH3955 to ([facility]:26PUY4540)  Requested for: 99QTT2504; Last    Rx:31Jan2017 Ordered   11  TiZANidine HCl - 2 MG Oral Tablet; Therapy: 15GPZ8188 to Recorded   12  TraZODone HCl - 100 MG Oral Tablet; Therapy: 22RBY6939 to Recorded   13  TraZODone HCl - 150 MG Oral Tablet; TAKE 2 TABLETS AT BEDTIME; Therapy: (Recorded:90Gkl4819) to Recorded  Medication List Reviewed: The medication list was reviewed and updated today  Allergies    1  No Known Drug Allergies    Vitals  Signs   Recorded: 89LKS9773 03:59PM   Temperature: 98 F, Oral  Heart Rate: 88, L Radial  Pulse Quality: Regular, L Radial  Respiration Quality: Normal  Respiration: 20  Systolic: 808, LUE, Sitting  Diastolic: 64, LUE, Sitting  BP Cuff Size: Large  Height: 5 ft 0 5 in  Weight: 168 lb 4 oz  BMI Calculated: 32 32  BSA Calculated: 1 74  O2 Saturation: 96, RA    Physical Exam    Constitutional   General appearance: No acute distress, well appearing and well nourished  Ears, Nose, Mouth, and Throat   External inspection of ears and nose: Normal     Otoscopic examination: Tympanic membranes translucent with normal light reflex  Canals patent without erythema  Nasal mucosa, septum, and turbinates: Normal without edema or erythema  Oropharynx: Normal with no erythema, edema, exudate or lesions  Pulmonary   Respiratory effort: No increased work of breathing or signs of respiratory distress  Auscultation of lungs: Abnormal   Auscultation of the lungs revealed diffuse wheezing     Cardiovascular   Auscultation of heart: Normal rate and rhythm, normal S1 and S2, without murmurs  Abdomen   Abdomen: Non-tender, no masses  Results/Data  *VB-Depression Screening 61JCR3198 04:00PM Surya Rojas     Test Name Result Flag Reference   Depression Scale Result      Depression Screen - Negative For Symptoms       Health Management  Other screening mammogram   Digital Bilateral Screening Mammogram With CAD; every 1 year; Next Due: 21MJA2794; Overdue  Screen for colon cancer   COLONOSCOPY; every 10 years; Next Due: 77SFH4962;  Overdue    Signatures   Electronically signed by : Ara Quiñonez, [facility]; Mar 22 2017  5:20PM EST                       (Author)    Electronically signed by : Andria Mccloud MD; Mar 27 2017  9:26AM EST                       (Author)

## 2018-01-16 NOTE — MISCELLANEOUS
Provider Comments  Provider Comments:   Dear Vanessa Ernst,    You missed an appointment on 03/21/2017 at 11:40 AM  We understand that many situations arise that occasionally prevents patients from keeping scheduled appointments  It is the policy of [facility] that patients notify us 24 hours in advance if unable to keep a scheduled appointment  Missed appointments jeopardize strong physician-patient relationships  The appointment you missed could have easily been made available to another patient if you had contacted us to cancel  We like to accommodate all of our patients, but when patients miss an appointment it prevents us from being able to help everyone  In the future, we request at least 24 hours notice of cancellation so we can make your appointment available to someone else in need         Sincerely,    The Physicians and Staff of [facility]        Signatures   Electronically signed by : Jignesh Pillai, [facility]; Mar 21 2017 11:53AM EST                       (Author)

## 2018-01-17 NOTE — PROGRESS NOTES
History of Present Illness  Care Coordination Encounter Information:   Type of Encounter: Telephonic   Contact: Initial Contact    Spoke to Patient   Outreached patient for a missed transition of care appointment yesterday  She completely forgot about the appointment she said  She continues with cough, wheeze and some nausea  no fevers  Using Symbicort only for wheeze  I connected her with Ervin Brennan assistant for an appointment to be made to check her out  I will check back after the appointment  Active Problems    1  Acute bronchitis (466 0) (J20 9)   2  Acute upper respiratory infection (465 9) (J06 9)   3  Anxiety (300 00) (F41 9)   4  Asthma (493 90) (J45 909)   5  Backache (724 5) (M54 9)   6  Dyspnea (786 09) (R06 00)   7  Encounter for screening colonoscopy (V76 51) (Z12 11)   8  Fibromyalgia (729 1) (M79 7)   9  Multiple sclerosis, relapsing-remitting (340) (G35)   10  Need for pneumococcal vaccination (V03 82) (Z23)   11  Need for prophylactic vaccination and inoculation against influenza (V04 81) (Z23)   12  Need for Tdap vaccination (V06 1) (Z23)   13  Other screening mammogram (V76 12) (Z12 31)   14  Screen for colon cancer (V76 51) (Z12 11)    Past Medical History    1  History of Ankle pain, unspecified laterality   2  History of Anxiety (300 00) (F41 9)   3  Asthma (493 90) (J45 909)   4  History of Asthma (493 90) (J45 909)   5  History of Asthma with acute exacerbation (493 92) (J45 901)   6  History of Chronic rhinitis (472 0) (J31 0)   7  Cough (786 2) (R05)   8  Dyspnea (786 09) (R06 00)   9  History of Elbow pain, unspecified laterality (719 42) (M25 529)   10  History of acute bronchitis (V12 69) (Z87 09)   11  History of gastroenteritis (V12 79) (Z87 19)   12  History of lipoma (V13 89) (Z86 018)   13  History of low back pain (V13 59) (Z87 39)   14  History of migraine (V12 49) (Z86 69)   15  History of obesity (V13 89) (Z86 39)   16   History of shortness of breath (V13 89) (Z87 898)   17  History of sinusitis (V12 69) (Z87 09)   18  History of urticaria (V13 3) (Z87 2)   19  History of Influenza due to unidentified influenza virus with other respiratory manifestation    (487 1) (J11 1)   20  History of Laceration Of Finger (883 0)   21  History of Multiple sclerosis (340) (G35)   22  History of Multiple sclerosis (340) (G35)   23  Multiple sclerosis, relapsing-remitting (340) (G35)   24  History of Murmur (785 2) (R01 1)   25  History of Obstructive sleep apnea (327 23) (G47 33)   26  History of Osteoarthritis (V13 4)   27  History of Post traumatic stress disorder (309 81) (F43 10)   28  History of Septic Tenosynovitis Of The Left Thumb (727 89)   29  History of Weight gain (783 1) (R63 5)    Surgical History    1  History of Knee Arthroscopy With Medial Meniscus Repair    Family History  Mother    1  Family history of Hepatitis, C Virus  Father    2  Family history of Asthma (V17 5)   3  Family history of Chronic Obstructive Pulmonary Disease   4  Family history of Diabetes Mellitus (V18 0)   5  Family history of Hypertension (V17 49)  Brother    6  Family history of Asthma (V17 5)    Social History    · Daily Coffee Consumption (1  Cups/Day)   · Disabled   ·    · Former smoker (S66 34) (T01 351)   · Judaism   · No alcohol use   · No drug use   · No living will   · Spouse/family support    Current Meds    1  Azithromycin 250 MG Oral Tablet; TAKE 2 TABLETS ON DAY 1 THEN TAKE 1 TABLET A   DAY FOR 4 DAYS; Therapy: 03SAH0418 to (Evaluate:29Dhl6866)  Requested for: 76XBC4728; Last   Rx:06Yam5569 Ordered   2  Benzonatate 200 MG Oral Capsule; TAKE 1 CAPSULE 2-3 TIMES DAILY; Therapy: 74BJU3739 to (Evaluate:30Ejx8460)  Requested for: 63GZM5612; Last   Rx:10Xnu8503 Ordered   3  MethylPREDNISolone 4 MG Oral Tablet Therapy Pack (Medrol); Take 6 tablets day 1, 5   tablets day 2, 4 tablets day 3, 3 tablets day 4, 2 tablets day 5, 1   tablet day 6;    Therapy: 86TMM2128 to (Alise Hinkle)  Requested for: 58URX1141; Last   Rx:12Moh4192 Ordered   4  Proventil  (90 Base) MCG/ACT Inhalation Aerosol Solution; INHALE 1-2 PUFFS   EVERY 4-6 HOURS AS NEEDED AND AS DIRECTED; Therapy: 96JVC1411 to [phone])  Requested for: 72JVO3905; Last   Rx:49Ruv4004 Ordered    5  LORazepam 0 5 MG Oral Tablet; TAKE 1 TABLET EVERY 12 HOURS AS NEEDED FOR   ANXIETY; Therapy: 19CLL2408 to (Last Rx:97Dae5570)  Requested for: 61TUB0184 Ordered    6  Symbicort 160-4 5 MCG/ACT Inhalation Aerosol; INHALE 2 PUFFS BY MOUTH TWICE   DAILY RINSE MOUTH AFTER RINSE; Therapy: 73FSK1698 to (Evaluate:28Oct2016)  Requested for: 41TRJ4316; Last   Rx:48Nex6019 Ordered   7  Benjamín-24 200 MG Oral Capsule Extended Release 24 Hour; take 1 capsule daily; Therapy: 40JRR9075 to (GQKRAYKE:95ZTU1764)  Requested for: 72AUH7529; Last   Rx:31Jan2017 Ordered    8  Albuterol Sulfate (2 5 MG/3ML) 0 083% Inhalation Nebulization Solution; USE AS   DIRECTED; Therapy: 43Knq4921 to (Last Rx:22Apr2014)  Requested for: 33Grw1833 Ordered    9  SUMAtriptan Succinate 100 MG Oral Tablet; TAKE 1 TABLET AT ONSET OF MIGRAINE   HEADACHE  MAY REPEAT IN 2 HOURS IFNEEDED; Therapy: 85GZH4554 to (Evaluate:17Mar2016)  Requested for: 01BJA4605; Last   Rx:46Mki1369 Ordered    10  Copaxone 20 MG/ML Subcutaneous Solution Prefilled Syringe; 3 times a week; Therapy: (Recorded:03Fgp6009) to Recorded   11  Copaxone 40 MG/ML Subcutaneous Solution Prefilled Syringe; Therapy: 88BDS0701 to Recorded   12  Hydrocodone-Acetaminophen 7 5-325 MG Oral Tablet; Therapy: 39WIQ2421 to Recorded   13  TiZANidine HCl - 2 MG Oral Tablet; Therapy: 24QKX9741 to Recorded   14  TraZODone HCl - 100 MG Oral Tablet; Therapy: 19OUD7665 to Recorded   15  TraZODone HCl - 150 MG Oral Tablet; TAKE 2 TABLETS AT BEDTIME; Therapy: (Recorded:60Zfl8809) to Recorded    Allergies    1   No Known Drug Allergies    Health Management   Digital Bilateral Screening Mammogram With CAD; every 1 year; Next Due: 60OHP7762; Overdue   COLONOSCOPY; every 10 years; Next Due: 82VQW8771; Overdue    End of Encounter Meds    1  Azithromycin 250 MG Oral Tablet; TAKE 2 TABLETS ON DAY 1 THEN TAKE 1 TABLET A   DAY FOR 4 DAYS; Therapy: 70LSG5619 to (Evaluate:29Sep2016)  Requested for: 86NWH6209; Last   Rx:32Fnm9048 Ordered   2  Benzonatate 200 MG Oral Capsule; TAKE 1 CAPSULE 2-3 TIMES DAILY; Therapy: 74QYO0828 to (Evaluate:29Sep2016)  Requested for: 38CDT5115; Last   Rx:82Rzq6662 Ordered   3  MethylPREDNISolone 4 MG Oral Tablet Therapy Pack (Medrol); Take 6 tablets day 1, 5   tablets day 2, 4 tablets day 3, 3 tablets day 4, 2 tablets day 5, 1   tablet day 6; Therapy: 11RPM5453 to (Mary Pickard)  Requested for: 13MBN1893; Last   Rx:24Sep2016 Ordered   4  Proventil  (90 Base) MCG/ACT Inhalation Aerosol Solution; INHALE 1-2 PUFFS   EVERY 4-6 HOURS AS NEEDED AND AS DIRECTED; Therapy: 97YIY2257 to 03 17 74 [phone] 53)  Requested for: 38ZTH2428; Last   Rx:19Gdk8787 Ordered    5  LORazepam 0 5 MG Oral Tablet; TAKE 1 TABLET EVERY 12 HOURS AS NEEDED FOR   ANXIETY; Therapy: 11NER3520 to (Last Rx:66Lil6344)  Requested for: 39BKM9907 Ordered    6  Symbicort 160-4 5 MCG/ACT Inhalation Aerosol; INHALE 2 PUFFS BY MOUTH TWICE   DAILY RINSE MOUTH AFTER RINSE; Therapy: 30QEC6850 to (Evaluate:28Oct2016)  Requested for: 76XWL8181; Last   Rx:30Jun2016 Ordered   7  Benjamín-24 200 MG Oral Capsule Extended Release 24 Hour; take 1 capsule daily; Therapy: 88QXS5084 to (CHOTPFQM:19SGP2313)  Requested for: 09HCJ0571; Last   Rx:31Jan2017 Ordered    8  Albuterol Sulfate (2 5 MG/3ML) 0 083% Inhalation Nebulization Solution; USE AS   DIRECTED; Therapy: 51Lrx6813 to (Last Rx:22Apr2014)  Requested for: 85Dxq1144 Ordered    9  SUMAtriptan Succinate 100 MG Oral Tablet; TAKE 1 TABLET AT ONSET OF MIGRAINE   HEADACHE  MAY REPEAT IN 2 HOURS IFNEEDED;    Therapy: 59IFM7684 to (Evaluate:17Mar2016)  Requested for: 77NZG4625; Last   Rx:91Tcr9224 Ordered    10  Copaxone 20 MG/ML Subcutaneous Solution Prefilled Syringe; 3 times a week; Therapy: (Recorded:49Ehk2244) to Recorded   11  Copaxone 40 MG/ML Subcutaneous Solution Prefilled Syringe; Therapy: 39YOA2150 to Recorded   12  Hydrocodone-Acetaminophen 7 5-325 MG Oral Tablet; Therapy: 58SRV3258 to Recorded   13  TiZANidine HCl - 2 MG Oral Tablet; Therapy: 53FYK7400 to Recorded   14  TraZODone HCl - 100 MG Oral Tablet; Therapy: 15FXW1334 to Recorded   15  TraZODone HCl - 150 MG Oral Tablet; TAKE 2 TABLETS AT BEDTIME; Therapy: (Recorded:83Cgt6415) to Recorded    Patient Care Team    Care Team Member Role Specialty Office Number   Echo HERNANDEZ   Specialist Pulmonary Medicine [phone]     Signatures   Electronically signed by : Megan Nobles RN; Mar 22 2017 10:15AM EST                       (Author)

## 2018-01-22 VITALS
WEIGHT: 180.06 LBS | SYSTOLIC BLOOD PRESSURE: 123 MMHG | HEART RATE: 72 BPM | RESPIRATION RATE: 18 BRPM | BODY MASS INDEX: 33.99 KG/M2 | DIASTOLIC BLOOD PRESSURE: 77 MMHG | OXYGEN SATURATION: 98 % | HEIGHT: 61 IN

## 2018-01-31 ENCOUNTER — APPOINTMENT (EMERGENCY)
Dept: RADIOLOGY | Facility: HOSPITAL | Age: 58
End: 2018-01-31
Payer: COMMERCIAL

## 2018-01-31 ENCOUNTER — HOSPITAL ENCOUNTER (EMERGENCY)
Facility: HOSPITAL | Age: 58
Discharge: HOME | End: 2018-01-31
Attending: EMERGENCY MEDICINE | Admitting: EMERGENCY MEDICINE
Payer: COMMERCIAL

## 2018-01-31 VITALS
BODY MASS INDEX: 32.5 KG/M2 | RESPIRATION RATE: 20 BRPM | DIASTOLIC BLOOD PRESSURE: 72 MMHG | OXYGEN SATURATION: 96 % | WEIGHT: 172 LBS | TEMPERATURE: 98 F | HEART RATE: 108 BPM | SYSTOLIC BLOOD PRESSURE: 137 MMHG

## 2018-01-31 DIAGNOSIS — J18.9 COMMUNITY ACQUIRED PNEUMONIA: Primary | ICD-10-CM

## 2018-01-31 DIAGNOSIS — J44.1 COPD EXACERBATION (HCC): ICD-10-CM

## 2018-01-31 LAB
ANION GAP SERPL CALCULATED.3IONS-SCNC: 6 MMOL/L (ref 4–13)
BASOPHILS # BLD AUTO: 0.03 THOUSANDS/ΜL (ref 0–0.1)
BASOPHILS NFR BLD AUTO: 0 % (ref 0–1)
BUN SERPL-MCNC: 13 MG/DL (ref 5–25)
CALCIUM SERPL-MCNC: 8.6 MG/DL (ref 8.3–10.1)
CHLORIDE SERPL-SCNC: 105 MMOL/L (ref 100–108)
CO2 SERPL-SCNC: 30 MMOL/L (ref 21–32)
CREAT SERPL-MCNC: 0.69 MG/DL (ref 0.6–1.3)
EOSINOPHIL # BLD AUTO: 0.43 THOUSAND/ΜL (ref 0–0.61)
EOSINOPHIL NFR BLD AUTO: 5 % (ref 0–6)
ERYTHROCYTE [DISTWIDTH] IN BLOOD BY AUTOMATED COUNT: 13.2 % (ref 11.6–15.1)
GFR SERPL CREATININE-BSD FRML MDRD: 98 ML/MIN/1.73SQ M
GLUCOSE SERPL-MCNC: 92 MG/DL (ref 65–140)
HCT VFR BLD AUTO: 41 % (ref 34.8–46.1)
HGB BLD-MCNC: 13.7 G/DL (ref 11.5–15.4)
LYMPHOCYTES # BLD AUTO: 1.07 THOUSANDS/ΜL (ref 0.6–4.47)
LYMPHOCYTES NFR BLD AUTO: 12 % (ref 14–44)
MCH RBC QN AUTO: 29.3 PG (ref 26.8–34.3)
MCHC RBC AUTO-ENTMCNC: 33.4 G/DL (ref 31.4–37.4)
MCV RBC AUTO: 88 FL (ref 82–98)
MONOCYTES # BLD AUTO: 0.56 THOUSAND/ΜL (ref 0.17–1.22)
MONOCYTES NFR BLD AUTO: 6 % (ref 4–12)
NEUTROPHILS # BLD AUTO: 6.68 THOUSANDS/ΜL (ref 1.85–7.62)
NEUTS SEG NFR BLD AUTO: 77 % (ref 43–75)
NRBC BLD AUTO-RTO: 0 /100 WBCS
PLATELET # BLD AUTO: 247 THOUSANDS/UL (ref 149–390)
PMV BLD AUTO: 10.3 FL (ref 8.9–12.7)
POTASSIUM SERPL-SCNC: 3.9 MMOL/L (ref 3.5–5.3)
RBC # BLD AUTO: 4.68 MILLION/UL (ref 3.81–5.12)
SODIUM SERPL-SCNC: 141 MMOL/L (ref 136–145)
TROPONIN I SERPL-MCNC: <0.02 NG/ML
WBC # BLD AUTO: 8.79 THOUSAND/UL (ref 4.31–10.16)

## 2018-01-31 PROCEDURE — 99285 EMERGENCY DEPT VISIT HI MDM: CPT

## 2018-01-31 PROCEDURE — 94640 AIRWAY INHALATION TREATMENT: CPT

## 2018-01-31 PROCEDURE — 36415 COLL VENOUS BLD VENIPUNCTURE: CPT | Performed by: EMERGENCY MEDICINE

## 2018-01-31 PROCEDURE — 80048 BASIC METABOLIC PNL TOTAL CA: CPT | Performed by: EMERGENCY MEDICINE

## 2018-01-31 PROCEDURE — 84484 ASSAY OF TROPONIN QUANT: CPT | Performed by: EMERGENCY MEDICINE

## 2018-01-31 PROCEDURE — 71046 X-RAY EXAM CHEST 2 VIEWS: CPT

## 2018-01-31 PROCEDURE — 93005 ELECTROCARDIOGRAM TRACING: CPT

## 2018-01-31 PROCEDURE — 85025 COMPLETE CBC W/AUTO DIFF WBC: CPT | Performed by: EMERGENCY MEDICINE

## 2018-01-31 RX ORDER — ALBUTEROL SULFATE 2.5 MG/3ML
5 SOLUTION RESPIRATORY (INHALATION) ONCE
Status: COMPLETED | OUTPATIENT
Start: 2018-01-31 | End: 2018-01-31

## 2018-01-31 RX ORDER — PREDNISONE 20 MG/1
40 TABLET ORAL DAILY
Qty: 10 TABLET | Refills: 0 | Status: SHIPPED | OUTPATIENT
Start: 2018-01-31 | End: 2018-02-05

## 2018-01-31 RX ORDER — ACETAMINOPHEN 325 MG/1
650 TABLET ORAL ONCE
Status: COMPLETED | OUTPATIENT
Start: 2018-01-31 | End: 2018-01-31

## 2018-01-31 RX ORDER — PREDNISONE 20 MG/1
40 TABLET ORAL ONCE
Status: COMPLETED | OUTPATIENT
Start: 2018-01-31 | End: 2018-01-31

## 2018-01-31 RX ORDER — AZITHROMYCIN 250 MG/1
500 TABLET, FILM COATED ORAL ONCE
Status: COMPLETED | OUTPATIENT
Start: 2018-01-31 | End: 2018-01-31

## 2018-01-31 RX ORDER — AZITHROMYCIN 250 MG/1
250 TABLET, FILM COATED ORAL EVERY 24 HOURS
Qty: 4 TABLET | Refills: 0 | Status: SHIPPED | OUTPATIENT
Start: 2018-01-31 | End: 2018-02-04

## 2018-01-31 RX ADMIN — IPRATROPIUM BROMIDE 0.5 MG: 0.5 SOLUTION RESPIRATORY (INHALATION) at 20:32

## 2018-01-31 RX ADMIN — ALBUTEROL SULFATE 5 MG: 2.5 SOLUTION RESPIRATORY (INHALATION) at 18:45

## 2018-01-31 RX ADMIN — ALBUTEROL SULFATE 5 MG: 2.5 SOLUTION RESPIRATORY (INHALATION) at 20:32

## 2018-01-31 RX ADMIN — AZITHROMYCIN 500 MG: 250 TABLET, FILM COATED ORAL at 21:30

## 2018-01-31 RX ADMIN — IPRATROPIUM BROMIDE 0.5 MG: 0.5 SOLUTION RESPIRATORY (INHALATION) at 18:45

## 2018-01-31 RX ADMIN — ACETAMINOPHEN 650 MG: 325 TABLET, FILM COATED ORAL at 18:45

## 2018-01-31 RX ADMIN — PREDNISONE 40 MG: 20 TABLET ORAL at 21:53

## 2018-01-31 NOTE — ED PROVIDER NOTES
History  Chief Complaint   Patient presents with    Shortness of Breath     pt with progressive shortness of breath since this AM, cough, pt with COPD, asthma and MS history      Patient is 79-year-old female with a history of COPD AMSwho presents the ED for sore throat and wheezing  Patient says he woke up at 2 in the morning with a sore throat and wheezing  Patient says is got worse throughout the day  She says she feels like she cannot take a deep breath  SHe is complaining of sharp pleuritic chest pain in her chest and her back  She says is worse when she takes a deep breath and when she coughs  She says that her cough is nonproductive  She denies any sick contacts  She denies fevers but admits to chills  She admits to a generalized throbbing headache which she says is similar to headaches she has had the past when she coughs  Admits to lightheadedness and dizziness however she says that she gets this with her MS  He denies abdominal pain, nausea, vomiting, diarrhea, urinary symptoms  Prior to Admission Medications   Prescriptions Last Dose Informant Patient Reported? Taking? Glatiramer Acetate (COPAXONE) 40 MG/ML SOSY   Yes Yes   Sig: Inject under the skin 3 (three) times a week  TraZODone HCl  MG TB24   Yes Yes   Sig: Take 400 mg by mouth daily  budesonide-formoterol (SYMBICORT) 160-4 5 mcg/act inhaler   Yes Yes   Sig: Inhale 2 puffs 2 (two) times a day  naproxen (NAPROSYN) 500 mg tablet   No No   Sig: Take 1 tablet by mouth 2 (two) times a day with meals for 30 days   theophylline (VIKA-24) 100 MG 24 hr capsule   Yes Yes   Sig: Take 100 mg by mouth daily  Facility-Administered Medications: None       Past Medical History:   Diagnosis Date    Asthma     COPD [facility]chronic obstructive pulmonary disease) [facility] [address] [facility]     [facility]        History reviewed  No pertinent surgical history  History reviewed  No pertinent family history    I have reviewed and agree with the history as documented  Social History   Substance Use Topics    Smoking status: Former Smoker    Smokeless tobacco: Not on file    Alcohol use No        Review of Systems   Constitutional: Negative for chills, diaphoresis and fever  HENT: Positive for sore throat  Negative for congestion, sinus pressure and trouble swallowing  Eyes: Negative for pain, discharge and itching  Respiratory: Positive for shortness of breath and wheezing  Negative for cough and chest tightness  Cardiovascular: Positive for chest pain  Negative for palpitations and leg swelling  Gastrointestinal: Negative for abdominal distention, abdominal pain, blood in stool, diarrhea, nausea and vomiting  Endocrine: Negative for polyphagia and polyuria  Genitourinary: Negative for difficulty urinating, dysuria, flank pain, hematuria, pelvic pain and vaginal bleeding  Musculoskeletal: Negative for arthralgias and back pain  Skin: Negative for color change and rash  Neurological: Positive for dizziness, light-headedness and headaches  Negative for syncope and weakness  Physical Exam  ED Triage Vitals [01/31/18 1741]   Temperature Pulse Respirations Blood Pressure SpO2   98 °F (36 7 °C) (!) 109 18 145/69 98 %      Temp Source Heart Rate Source Patient Position - Orthostatic VS BP Location FiO2 (%)   Tympanic Monitor Sitting Left arm --      Pain Score       8           Orthostatic Vital Signs  Vitals:    01/31/18 1836 01/31/18 2017 01/31/18 2117 01/31/18 2155   BP: 104/57 144/65 136/68 137/72   Pulse: 100 104 (!) 121 (!) 108   Patient Position - Orthostatic VS: Lying Lying Lying Lying       Physical Exam   Constitutional: She is oriented to person, place, and time  She appears well-developed and well-nourished  No distress  HENT:   Head: Normocephalic and atraumatic  Mouth/Throat: Oropharynx is clear and moist    Patient has history of tonsillectomy     Eyes: Conjunctivae are normal  Pupils are equal, round, and reactive to light  Neck: Neck supple  Cardiovascular: Normal rate, regular rhythm and normal heart sounds  Exam reveals no gallop and no friction rub  No murmur heard  Pulmonary/Chest: Tachypnea noted  No respiratory distress  She has rhonchi  Abdominal: Soft  She exhibits no distension  There is no tenderness  There is no guarding  Musculoskeletal: Normal range of motion  She exhibits no edema or deformity  Lymphadenopathy:     She has no cervical adenopathy  Neurological: She is alert and oriented to person, place, and time  No cranial nerve deficit or sensory deficit  Skin: Skin is warm and dry  Psychiatric: She has a normal mood and affect  Nursing note and vitals reviewed  ED Medications  Medications   albuterol inhalation solution 5 mg (5 mg Nebulization Given 1/31/18 1845)   acetaminophen (TYLENOL) tablet 650 mg (650 mg Oral Given 1/31/18 1845)   ipratropium (ATROVENT) 0 02 % inhalation solution 0 5 mg (0 5 mg Nebulization Given 1/31/18 1845)   albuterol inhalation solution 5 mg (5 mg Nebulization Given 1/31/18 2032)   ipratropium (ATROVENT) 0 02 % inhalation solution 0 5 mg (0 5 mg Nebulization Given 1/31/18 2032)   azithromycin (ZITHROMAX) tablet 500 mg (500 mg Oral Given 1/31/18 2130)   predniSONE tablet 40 mg (40 mg Oral Given 1/31/18 2153)       Diagnostic Studies  Results Reviewed     Procedure Component Value Units Date/Time    Troponin I [[id number]]  (Normal) Collected:  01/31/18 1845    Lab Status:  Final result Specimen:  Blood from Arm, Left Updated:  01/31/18 1921     Troponin I <0 02 ng/mL     Narrative:         [employer] Chemistry analyzer 99% cutoff is > 0 04 ng/mL in network labs    o cTnI 99% cutoff is useful only when applied to patients in the clinical setting of myocardial ischemia  o cTnI 99% cutoff should be interpreted in the context of clinical history, ECG findings and possibly cardiac imaging to establish correct diagnosis    o cTnI 99% cutoff may be suggestive but clearly not indicative of a coronary event without the clinical setting of myocardial ischemia  Basic metabolic panel [[id number]] Collected:  01/31/18 1845    Lab Status:  Final result Specimen:  Blood from Arm, Left Updated:  01/31/18 1916     Sodium 141 mmol/L      Potassium 3 9 mmol/L      Chloride 105 mmol/L      CO2 30 mmol/L      Anion Gap 6 mmol/L      BUN 13 mg/dL      Creatinine 0 69 mg/dL      Glucose 92 mg/dL      Calcium 8 6 mg/dL      eGFR 98 ml/min/1 73sq m     Narrative:         National Kidney Disease Education Program recommendations are as follows:  GFR calculation is accurate only with a steady state creatinine  Chronic Kidney disease less than 60 ml/min/1 73 sq  meters  Kidney failure less than 15 ml/min/1 73 sq  meters  CBC and differential [[id number]]  (Abnormal) Collected:  01/31/18 1845    Lab Status:  Final result Specimen:  Blood from Arm, Left Updated:  01/31/18 1901     WBC 8 79 Thousand/uL      RBC 4 68 Million/uL      Hemoglobin 13 7 g/dL      Hematocrit 41 0 %      MCV 88 fL      MCH 29 3 pg      MCHC 33 4 g/dL      RDW 13 2 %      MPV 10 3 fL      Platelets 388 Thousands/uL      nRBC 0 /100 WBCs      Neutrophils Relative 77 (H) %      Lymphocytes Relative 12 (L) %      Monocytes Relative 6 %      Eosinophils Relative 5 %      Basophils Relative 0 %      Neutrophils Absolute 6 68 Thousands/µL      Lymphocytes Absolute 1 07 Thousands/µL      Monocytes Absolute 0 56 Thousand/µL      Eosinophils Absolute 0 43 Thousand/µL      Basophils Absolute 0 03 Thousands/µL                  XR chest 2 views   Final Result by Fareed Fink MD (01/31 2016)      Suggestion of increased opacity in the lingula           Workstation performed: [id number]               Procedures  Procedures      Phone Consults  ED Phone Contact    ED Course  ED Course                                MDM  Number of Diagnoses or Management Options  Community acquired pneumonia:   COPD exacerbation [facility]):   Diagnosis management comments: 79-year-old lady with history of COPD presents for sore throat, dry cough, wheezing and shortness of breath  Cleans chest feels tight, and complains of some sharp chest pain when she coughs  No fevers or chills  No history of DVTs, recent surgeries, recent immobilization, OCP arching use, peripheral leg swelling  Plan:  CBC, CMP, chest x-ray to rule out pneumonia, or DuoNeb, prednisone  Chest x-ray was read as showing a lingular opacity  Patient received 1st dose of azithromycin in the ED  Patient's symptoms improving with 2 DuoNeb treatments  Discharged home with script for azithromycin and prednisone  Told to follow up with her PCP and return to ED for symptoms worsen    CritCare Time    Disposition  Final diagnoses:   Community acquired pneumonia   COPD exacerbation ([facility] [address] )     Time reflects when diagnosis was documented in both MDM as applicable and the Disposition within this note     Time User Action Codes Description Comment    1/31/2018  9:07 PM Lolis Juárez Add [J18 9] Community acquired pneumonia     1/31/2018  9:07 PM Lolis Juárez Add [J44 1] COPD exacerbation [facility])       ED Disposition     ED Disposition Condition Comment    Discharge  Mei Briggs discharge to home/self care      Condition at discharge: Stable        Follow-up Information     Follow up With Specialties Details Why [address] Dayanna Reeves MD Internal Medicine  As needed, If symptoms worsen [address]  [phone]          Discharge Medication List as of 1/31/2018  9:43 PM      START taking these medications    Details   azithromycin (ZITHROMAX Z-LIBIA) 250 mg tablet Take 1 tablet (250 mg total) by mouth every 24 hours for 4 days, Starting Wed 1/31/2018, Until Sun 2/4/2018, Normal      predniSONE 20 mg tablet Take 2 tablets (40 mg total) by mouth daily for 5 days, Starting Wed 1/31/2018, Until Mon 2/5/2018, Normal         CONTINUE these medications which have NOT CHANGED    Details   budesonide-formoterol (SYMBICORT) 160-4 5 mcg/act inhaler Inhale 2 puffs 2 (two) times a day , Until Discontinued, Historical Med      Glatiramer Acetate (COPAXONE) 40 MG/ML SOSY Inject under the skin 3 (three) times a week , Until Discontinued, Historical Med      theophylline (VIKA-24) 100 MG 24 hr capsule Take 100 mg by mouth daily  , Until Discontinued, Historical Med      TraZODone HCl  MG TB24 Take 400 mg by mouth daily  , Until Discontinued, Historical Med      naproxen (NAPROSYN) 500 mg tablet Take 1 tablet by mouth 2 (two) times a day with meals for 30 days, Starting 10/16/2016, Until Tue 11/15/16, Normal           No discharge procedures on file  ED Provider  Attending physically available and evaluated Andrew Hensley I managed the patient along with the ED Attending      Electronically Signed by         Andry Bowman DO  02/01/18 8538

## 2018-02-01 NOTE — ED ATTENDING ATTESTATION
Kristen Carson DO, saw and evaluated the patient  I have discussed the patient with the resident/non-physician practitioner and agree with the resident's/non-physician practitioner's findings, Plan of Care, and MDM as documented in the resident's/non-physician practitioner's note, except where noted  All available labs and Radiology studies were reviewed  At this point I agree with the current assessment done in the Emergency Department  I have conducted an independent evaluation of this patient a history and physical is as follows:    57 yo female presents for evaluation of progressively worsening dyspnea starting this morning, associated with non productive cough, wheezing  Symptoms constant, generalized in nature  No a/e factors  Moderate severity  Pt has hx of COPD, MS  Denies leg pain or swelling, no hx of DVT or PE  Imp: dyspnea likely acute COPD exacerbation from viral bronchitis  Plan: labs, ECG, CXR, breathing tx, reassess        Critical Care Time  CritCare Time    Procedures

## 2018-02-01 NOTE — DISCHARGE INSTRUCTIONS
COPD (Chronic Obstructive Pulmonary Disease)   WHAT YOU NEED TO KNOW:   Chronic obstructive pulmonary disease (COPD) is a lung disease that makes it hard for you to breathe  It is usually a result of lung damage caused by years of irritation and inflammation in your lungs  DISCHARGE INSTRUCTIONS:   Call 911 if:   · You feel lightheaded, short of breath, and have chest pain  Return to the emergency department if:   · You are confused, dizzy, or feel faint  · Your arm or leg feels warm, tender, and painful  It may look swollen and red  · You cough up blood  Contact your healthcare provider if:   · You have more shortness of breath than usual      · You need more medicine than usual to control your symptoms  · You are coughing or wheezing more than usual      · You are coughing up more mucus, or it is a different color or has a different odor  · You gain more than 3 pounds in a week  · You have a fever, a runny or stuffy nose, and a sore throat, or other cold or flu symptoms  · Your skin, lips, or nails start to turn blue  · You have swelling in your legs or ankles  · You are very tired or weak for more than a day  · You notice changes in your mood, or changes in your ability to think or concentrate  · You have questions or concerns about your condition or care  Medicines:   · Medicines  may be used to open your airways, decrease swelling and inflammation in your lungs, or treat an infection  You may need 2 or more medicines  A short-acting medicine relieves symptoms quickly  Long-acting medicines will control or prevent symptoms  Ask for more information about the medicines you are given and how to use them safely  · Take your medicine as directed  Contact your healthcare provider if you think your medicine is not helping or if you have side effects  Tell him or her if you are allergic to any medicine  Keep a list of the medicines, vitamins, and herbs you take  Include the amounts, and when and why you take them  Bring the list or the pill bottles to follow-up visits  Carry your medicine list with you in case of an emergency  Help make breathing easier:   · Use pursed-lip breathing any time you feel short of breath  Take a deep breath in through your nose  Slowly breathe out through your mouth with your lips pursed for twice as long as you inhaled  You can also practice this breathing pattern while you bend, lift, climb stairs, or exercise  It slows down your breathing and helps move more air in and out of your lungs  · Do not smoke, and avoid others who smoke  Nicotine and other substances can cause lung irritation or damage and make it harder for you to breathe  Do not use e-cigarettes or smokeless tobacco  They still contain nicotine  Ask your healthcare provider for information if you currently smoke and need help to quit  For support and more information:  ¨ [employer]  Phone: 2- 921 - 819-5182  Web Address: [place]      · Be aware of and avoid anything that makes your symptoms worse  Stay out of high altitudes and places with high humidity  Stay inside, or cover your mouth and nose with a scarf when you are outside during cold weather  Stay inside on days when air pollution or pollen counts are high  Do not use aerosol sprays such as deodorant, bug spray, and hair spray  Manage COPD and help prevent exacerbations:  COPD is a serious condition that gets worse over time  A COPD exacerbation means your symptoms suddenly get worse  It is important to prevent exacerbations  An exacerbation can cause more lung damage  COPD cannot be cured, but you can take action to feel better and prevent COPD exacerbations:  · Protect yourself from germs  Germs can get into your lungs and cause an infection  An infection in your lungs can create more mucus and make it harder to breathe   An infection can also create swelling in your airways and prevent air from getting in  You can decrease your risk for infection by doing the following:     [facility] your hands often with soap and water  Carry germ-killing gel with you  You can use the gel to clean your hands when soap and water are not available  ¨ Do not touch your eyes, nose, or mouth unless you have washed your hands first      ¨ Always cover your mouth when you cough  Cough into a tissue or your shirtsleeve so you do not spread germs from your hands  ¨ Try to avoid people who have a cold or the flu  If you are sick, stay away from others as much as possible  · Drink more liquids  This will help to keep your air passages moist and help you cough up mucus  Ask how much liquid to drink each day and which liquids are best for you  · Exercise daily  Exercise for at least 20 minutes each day to help increase your energy and decrease shortness of breath  Walking or riding a bike are good ways to exercise  Talk to your healthcare provider about the best exercise plan for you  · Ask about vaccines  Your healthcare provider may recommend that you get regular flu and pneumonia vaccines  Pneumonia can become life-threatening for a person who has COPD  Ask about other vaccines you may need  Ask your healthcare provider about the flu and pneumonia vaccines  All adults should get the flu (influenza) vaccine every year as soon as it becomes available  The pneumonia vaccine is given to adults aged 72 or older to prevent pneumococcal disease, such as pneumonia  Adults aged 23 to 59 years who are at high risk for pneumococcal disease also should get the pneumococcal vaccine  It may need to be repeated 1 or 5 years later  Pulmonary rehabilitation:  Your healthcare provider may recommend a program to help you manage your symptoms and improve your quality of life  It may include nutritional counseling and exercise to strengthen your lungs     Make decisions about your choices for future treatment:  Ask for information about advanced medical directives and living ballesteros  These documents help you decide and write down your choices for treatment and end-of-life care  It is best to complete them when you feel well and can think clearly about your wishes  The information can then be kept for future use if you are in the hospital or become very ill  Follow up with your healthcare provider as directed: You may need more tests  Your healthcare provider may refer you to a pulmonary (lung) specialist  Write down your questions so you remember to ask them during your visits  © 2017 [facility] Information is for End User's use only and may not be sold, redistributed or otherwise used for commercial purposes  All illustrations and images included in CareNotes® are the copyrighted property of A [employer] M [employer]  or [employer]  The above information is an  only  It is not intended as medical advice for individual conditions or treatments  Talk to your doctor, nurse or pharmacist before following any medical regimen to see if it is safe and effective for you  Community Acquired Pneumonia   WHAT YOU NEED TO KNOW:   Community-acquired pneumonia (CAP) is a lung infection that you get outside of a hospital or nursing home setting  Your lungs become inflamed and cannot work well  CAP may be caused by bacteria, viruses, or fungi  DISCHARGE INSTRUCTIONS:   Return to the emergency department if:   · You are confused and cannot think clearly  · You have increased trouble breathing  · Your lips or fingernails turn gray or blue  Contact your healthcare provider if:   · Your symptoms do not get better, or they get worse  · You are urinating less, or not at all  · You have questions or concerns about your condition or care  Medicines:   · Medicines  may be given to treat a bacterial, viral, or fungal infection   You may also be given medicines to dilate your bronchial tubes to help you breathe more easily  · Take your medicine as directed  Contact your healthcare provider if you think your medicine is not helping or if you have side effects  Tell him or her if you are allergic to any medicine  Keep a list of the medicines, vitamins, and herbs you take  Include the amounts, and when and why you take them  Bring the list or the pill bottles to follow-up visits  Carry your medicine list with you in case of an emergency  Follow up with your healthcare provider within 3 days or as directed: You may need another x-ray  Write down your questions so you remember to ask them during your visits  Deep breathing and coughing:  Deep breathing helps open the air passages in your lungs  Coughing helps bring up mucus from your lungs  Take a deep breath and hold the breath as long as you can  Then push the air out of your lungs with a deep, strong cough  Spit out any mucus you have coughed up  Take 10 deep breaths in a row every hour that you are awake  Remember to follow each deep breath with a cough  Do not smoke or allow others to smoke around you:  Nicotine and other chemicals in cigarettes and cigars can cause lung damage  Ask your healthcare provider for information if you currently smoke and need help to quit  E-cigarettes or smokeless tobacco still contain nicotine  Talk to your healthcare provider before you use these products  Manage CAP at home:   · Breathe warm, moist air  This helps loosen mucus  Loosely place a warm, wet washcloth over your nose and mouth  A room humidifier may also help make the air moist     · Drink liquids as directed  Ask your healthcare provider how much liquid to drink each day and which liquids to drink  Liquids help make mucus thin and easier to get out of your body  · Gently tap your chest   This helps loosen mucus so it is easier to cough   Lie with your head lower than your chest several times a day and tap your chest      · Get plenty of rest  Rest helps your body heal   Prevent CAP:   · Wash your hands often with soap and water  Carry germ-killing hand gel with you  You can use the gel to clean your hands when soap and water are not available  Do not touch your eyes, nose, or mouth unless you have washed your hands first      · Clean surfaces often  Clean doorknobs, countertops, cell phones, and other surfaces that are touched often  · Always cover your mouth when you cough  Cough into a tissue or your shirtsleeve so you do not spread germs from your hands  · Try to avoid people who have a cold or the flu  If you are sick, stay away from others as much as possible  · Ask about vaccines  You may need a vaccine to help prevent pneumonia  Get an influenza (flu) vaccine every year as soon as it becomes available  © 2017 [employer] [employer]  Information is for End User's use only and may not be sold, redistributed or otherwise used for commercial purposes  All illustrations and images included in CareNotes® are the copyrighted property of A D A M [employer] Inc  or [employer] Mill[employer]  The above information is an  only  It is not intended as medical advice for individual conditions or treatments  Talk to your doctor, nurse or pharmacist before following any medical regimen to see if it is safe and effective for you

## 2018-02-02 LAB
ATRIAL RATE: 89 BPM
P AXIS: 27 DEGREES
PR INTERVAL: 144 MS
QRS AXIS: 8 DEGREES
QRSD INTERVAL: 74 MS
QT INTERVAL: 344 MS
QTC INTERVAL: 418 MS
T WAVE AXIS: 28 DEGREES
VENTRICULAR RATE: 89 BPM

## 2018-02-02 PROCEDURE — 93010 ELECTROCARDIOGRAM REPORT: CPT | Performed by: INTERNAL MEDICINE

## 2018-02-09 ENCOUNTER — OFFICE VISIT (OUTPATIENT)
Dept: FAMILY MEDICINE CLINIC | Facility: CLINIC | Age: 58
End: 2018-02-09
Payer: COMMERCIAL

## 2018-02-09 ENCOUNTER — HOSPITAL ENCOUNTER (EMERGENCY)
Facility: HOSPITAL | Age: 58
Discharge: HOME | End: 2018-02-09
Attending: EMERGENCY MEDICINE | Admitting: EMERGENCY MEDICINE
Payer: COMMERCIAL

## 2018-02-09 ENCOUNTER — APPOINTMENT (EMERGENCY)
Dept: RADIOLOGY | Facility: HOSPITAL | Age: 58
End: 2018-02-09
Payer: COMMERCIAL

## 2018-02-09 VITALS
TEMPERATURE: 98.3 F | DIASTOLIC BLOOD PRESSURE: 59 MMHG | WEIGHT: 181 LBS | HEART RATE: 97 BPM | RESPIRATION RATE: 26 BRPM | HEIGHT: 61 IN | BODY MASS INDEX: 34.17 KG/M2 | SYSTOLIC BLOOD PRESSURE: 133 MMHG | OXYGEN SATURATION: 93 %

## 2018-02-09 VITALS
TEMPERATURE: 98.6 F | WEIGHT: 184 LBS | RESPIRATION RATE: 20 BRPM | HEART RATE: 69 BPM | BODY MASS INDEX: 36.12 KG/M2 | HEIGHT: 60 IN | SYSTOLIC BLOOD PRESSURE: 146 MMHG | DIASTOLIC BLOOD PRESSURE: 90 MMHG | OXYGEN SATURATION: 98 %

## 2018-02-09 DIAGNOSIS — J44.9 COPD (CHRONIC OBSTRUCTIVE PULMONARY DISEASE) (HCC): Primary | ICD-10-CM

## 2018-02-09 DIAGNOSIS — B34.9 VIRAL SYNDROME: ICD-10-CM

## 2018-02-09 DIAGNOSIS — J18.9 COMMUNITY ACQUIRED PNEUMONIA OF LEFT UPPER LOBE OF LUNG: ICD-10-CM

## 2018-02-09 DIAGNOSIS — J18.9 COMMUNITY ACQUIRED PNEUMONIA OF LEFT UPPER LOBE OF LUNG: Primary | ICD-10-CM

## 2018-02-09 DIAGNOSIS — R06.2 WHEEZING: ICD-10-CM

## 2018-02-09 DIAGNOSIS — J45.41 MODERATE PERSISTENT ASTHMA WITH ACUTE EXACERBATION: ICD-10-CM

## 2018-02-09 DIAGNOSIS — R06.03 RESPIRATORY DISTRESS: ICD-10-CM

## 2018-02-09 PROBLEM — E78.00 ELEVATED LDL CHOLESTEROL LEVEL: Status: ACTIVE | Noted: 2017-09-01

## 2018-02-09 PROBLEM — G89.4 CHRONIC PAIN DISORDER: Status: ACTIVE | Noted: 2017-07-31

## 2018-02-09 PROBLEM — F32.A ANXIETY AND DEPRESSION: Status: ACTIVE | Noted: 2017-11-14

## 2018-02-09 PROBLEM — M48.061 DEGENERATIVE LUMBAR SPINAL STENOSIS: Status: ACTIVE | Noted: 2017-11-14

## 2018-02-09 PROBLEM — F43.10 PTSD (POST-TRAUMATIC STRESS DISORDER): Status: ACTIVE | Noted: 2017-07-31

## 2018-02-09 PROBLEM — F41.9 ANXIETY AND DEPRESSION: Status: ACTIVE | Noted: 2017-11-14

## 2018-02-09 LAB
ANION GAP SERPL CALCULATED.3IONS-SCNC: 6 MMOL/L (ref 4–13)
ATRIAL RATE: 77 BPM
BASOPHILS # BLD AUTO: 0.01 THOUSANDS/ΜL (ref 0–0.1)
BASOPHILS NFR BLD AUTO: 0 % (ref 0–1)
BUN SERPL-MCNC: 6 MG/DL (ref 5–25)
CALCIUM SERPL-MCNC: 8.9 MG/DL (ref 8.3–10.1)
CHLORIDE SERPL-SCNC: 106 MMOL/L (ref 100–108)
CO2 SERPL-SCNC: 30 MMOL/L (ref 21–32)
CREAT SERPL-MCNC: 0.72 MG/DL (ref 0.6–1.3)
EOSINOPHIL # BLD AUTO: 0.41 THOUSAND/ΜL (ref 0–0.61)
EOSINOPHIL NFR BLD AUTO: 5 % (ref 0–6)
ERYTHROCYTE [DISTWIDTH] IN BLOOD BY AUTOMATED COUNT: 12.9 % (ref 11.6–15.1)
GFR SERPL CREATININE-BSD FRML MDRD: 95 ML/MIN/1.73SQ M
GLUCOSE SERPL-MCNC: 130 MG/DL (ref 65–140)
HCT VFR BLD AUTO: 39.5 % (ref 34.8–46.1)
HGB BLD-MCNC: 12.9 G/DL (ref 11.5–15.4)
LYMPHOCYTES # BLD AUTO: 2.2 THOUSANDS/ΜL (ref 0.6–4.47)
LYMPHOCYTES NFR BLD AUTO: 27 % (ref 14–44)
MCH RBC QN AUTO: 28.3 PG (ref 26.8–34.3)
MCHC RBC AUTO-ENTMCNC: 32.7 G/DL (ref 31.4–37.4)
MCV RBC AUTO: 87 FL (ref 82–98)
MONOCYTES # BLD AUTO: 0.46 THOUSAND/ΜL (ref 0.17–1.22)
MONOCYTES NFR BLD AUTO: 6 % (ref 4–12)
NEUTROPHILS # BLD AUTO: 5.21 THOUSANDS/ΜL (ref 1.85–7.62)
NEUTS SEG NFR BLD AUTO: 62 % (ref 43–75)
NRBC BLD AUTO-RTO: 0 /100 WBCS
P AXIS: 18 DEGREES
PLATELET # BLD AUTO: 277 THOUSANDS/UL (ref 149–390)
PMV BLD AUTO: 9.7 FL (ref 8.9–12.7)
POTASSIUM SERPL-SCNC: 3.6 MMOL/L (ref 3.5–5.3)
PR INTERVAL: 130 MS
QRS AXIS: 24 DEGREES
QRSD INTERVAL: 80 MS
QT INTERVAL: 354 MS
QTC INTERVAL: 400 MS
RBC # BLD AUTO: 4.56 MILLION/UL (ref 3.81–5.12)
SODIUM SERPL-SCNC: 142 MMOL/L (ref 136–145)
T WAVE AXIS: 5 DEGREES
THEOPHYLLINE SERPL-MCNC: 3.5 UG/ML (ref 10–20)
VENTRICULAR RATE: 77 BPM
WBC # BLD AUTO: 8.31 THOUSAND/UL (ref 4.31–10.16)

## 2018-02-09 PROCEDURE — 99285 EMERGENCY DEPT VISIT HI MDM: CPT

## 2018-02-09 PROCEDURE — 93005 ELECTROCARDIOGRAM TRACING: CPT

## 2018-02-09 PROCEDURE — 36415 COLL VENOUS BLD VENIPUNCTURE: CPT | Performed by: EMERGENCY MEDICINE

## 2018-02-09 PROCEDURE — 94640 AIRWAY INHALATION TREATMENT: CPT

## 2018-02-09 PROCEDURE — 99214 OFFICE O/P EST MOD 30 MIN: CPT | Performed by: NURSE PRACTITIONER

## 2018-02-09 PROCEDURE — 96375 TX/PRO/DX INJ NEW DRUG ADDON: CPT

## 2018-02-09 PROCEDURE — 93010 ELECTROCARDIOGRAM REPORT: CPT | Performed by: INTERNAL MEDICINE

## 2018-02-09 PROCEDURE — 85025 COMPLETE CBC W/AUTO DIFF WBC: CPT | Performed by: EMERGENCY MEDICINE

## 2018-02-09 PROCEDURE — 80198 ASSAY OF THEOPHYLLINE: CPT | Performed by: EMERGENCY MEDICINE

## 2018-02-09 PROCEDURE — 80048 BASIC METABOLIC PNL TOTAL CA: CPT | Performed by: EMERGENCY MEDICINE

## 2018-02-09 PROCEDURE — 94760 N-INVAS EAR/PLS OXIMETRY 1: CPT

## 2018-02-09 PROCEDURE — 96365 THER/PROPH/DIAG IV INF INIT: CPT

## 2018-02-09 PROCEDURE — 96366 THER/PROPH/DIAG IV INF ADDON: CPT

## 2018-02-09 PROCEDURE — 71046 X-RAY EXAM CHEST 2 VIEWS: CPT

## 2018-02-09 PROCEDURE — 94644 CONT INHLJ TX 1ST HOUR: CPT

## 2018-02-09 RX ORDER — ALBUTEROL SULFATE 1.25 MG/3ML
1 SOLUTION RESPIRATORY (INHALATION) EVERY 6 HOURS PRN
Qty: 75 ML | Refills: 0 | Status: SHIPPED | OUTPATIENT
Start: 2018-02-09 | End: 2018-02-09

## 2018-02-09 RX ORDER — IBUPROFEN 400 MG/1
400 TABLET ORAL ONCE
Status: COMPLETED | OUTPATIENT
Start: 2018-02-09 | End: 2018-02-09

## 2018-02-09 RX ORDER — SUMATRIPTAN 100 MG/1
1 TABLET, FILM COATED ORAL
COMMUNITY
Start: 2011-11-01 | End: 2020-10-02 | Stop reason: SDUPTHER

## 2018-02-09 RX ORDER — MAGNESIUM SULFATE HEPTAHYDRATE 40 MG/ML
2 INJECTION, SOLUTION INTRAVENOUS ONCE
Status: COMPLETED | OUTPATIENT
Start: 2018-02-09 | End: 2018-02-09

## 2018-02-09 RX ORDER — IPRATROPIUM BROMIDE AND ALBUTEROL SULFATE 2.5; .5 MG/3ML; MG/3ML
3 SOLUTION RESPIRATORY (INHALATION) ONCE
Status: COMPLETED | OUTPATIENT
Start: 2018-02-09 | End: 2018-02-09

## 2018-02-09 RX ORDER — METHYLPREDNISOLONE SODIUM SUCCINATE 125 MG/2ML
125 INJECTION, POWDER, LYOPHILIZED, FOR SOLUTION INTRAMUSCULAR; INTRAVENOUS ONCE
Status: COMPLETED | OUTPATIENT
Start: 2018-02-09 | End: 2018-02-09

## 2018-02-09 RX ORDER — TRAZODONE HYDROCHLORIDE 100 MG/1
200 TABLET ORAL
Refills: 2 | COMMUNITY
Start: 2018-01-28 | End: 2018-06-04 | Stop reason: SDUPTHER

## 2018-02-09 RX ORDER — ALBUTEROL SULFATE 1.25 MG/3ML
1 SOLUTION RESPIRATORY (INHALATION) EVERY 6 HOURS PRN
Qty: 75 ML | Refills: 0 | Status: SHIPPED | OUTPATIENT
Start: 2018-02-09 | End: 2018-05-10

## 2018-02-09 RX ORDER — BUDESONIDE AND FORMOTEROL FUMARATE DIHYDRATE 160; 4.5 UG/1; UG/1
2 AEROSOL RESPIRATORY (INHALATION) 2 TIMES DAILY
Qty: 1 INHALER | Refills: 0 | Status: SHIPPED | COMMUNITY
Start: 2018-02-09 | End: 2018-08-06 | Stop reason: ALTCHOICE

## 2018-02-09 RX ORDER — ALBUTEROL SULFATE 90 UG/1
2 AEROSOL, METERED RESPIRATORY (INHALATION) EVERY 4 HOURS PRN
Status: DISCONTINUED | OUTPATIENT
Start: 2018-02-09 | End: 2018-08-06

## 2018-02-09 RX ORDER — SODIUM CHLORIDE FOR INHALATION 0.9 %
3 VIAL, NEBULIZER (ML) INHALATION ONCE
Status: COMPLETED | OUTPATIENT
Start: 2018-02-09 | End: 2018-02-09

## 2018-02-09 RX ORDER — BUDESONIDE AND FORMOTEROL FUMARATE DIHYDRATE 160; 4.5 UG/1; UG/1
AEROSOL RESPIRATORY (INHALATION)
COMMUNITY
Start: 2012-08-03 | End: 2018-02-09 | Stop reason: SDUPTHER

## 2018-02-09 RX ADMIN — SODIUM CHLORIDE 1000 ML: 0.9 INJECTION, SOLUTION INTRAVENOUS at 13:37

## 2018-02-09 RX ADMIN — IBUPROFEN 400 MG: 400 TABLET, FILM COATED ORAL at 11:33

## 2018-02-09 RX ADMIN — METHYLPREDNISOLONE SODIUM SUCCINATE 125 MG: 125 INJECTION, POWDER, FOR SOLUTION INTRAMUSCULAR; INTRAVENOUS at 13:43

## 2018-02-09 RX ADMIN — IPRATROPIUM BROMIDE AND ALBUTEROL SULFATE 3 ML: .5; 3 SOLUTION RESPIRATORY (INHALATION) at 13:18

## 2018-02-09 RX ADMIN — IPRATROPIUM BROMIDE 1 MG: 0.5 SOLUTION RESPIRATORY (INHALATION) at 13:30

## 2018-02-09 RX ADMIN — MAGNESIUM SULFATE HEPTAHYDRATE 2 G: 40 INJECTION, SOLUTION INTRAVENOUS at 13:47

## 2018-02-09 RX ADMIN — ALBUTEROL SULFATE 10 MG: 2.5 SOLUTION RESPIRATORY (INHALATION) at 13:30

## 2018-02-09 RX ADMIN — ISODIUM CHLORIDE 12 ML: 0.03 SOLUTION RESPIRATORY (INHALATION) at 13:30

## 2018-02-09 NOTE — PROGRESS NOTES
Assessment/Plan:  Lung w/wheezing throughout - Rales RLL  Pt not responding to OP therapy for CAP lingula diag in ER 1/31/18 - had course of azithromycin/ had course of prednisone; using symbicort and theophylline at home - pmh of MS - follows w/ neurology - no sxs of weakness  NEED ER eval again  Reordered nebulizer solution and rescue inhaler for home  Diagnoses and all orders for this visit:    Community acquired pneumonia of left upper lobe of lung [facility])  -     Ambulatory Referral to Emergency Medicine; Future    Wheezing  -     Ambulatory Referral to Emergency Medicine; Future    Respiratory distress  -     Ambulatory Referral to Emergency Medicine; Future    Moderate persistent asthma with acute exacerbation  -     budesonide-formoterol (SYMBICORT) 160-4 5 mcg/act inhaler; Inhale 2 puffs 2 (two) times a day    Other orders  -     Discontinue: budesonide-formoterol (SYMBICORT) 160-4 5 mcg/act inhaler; two puffs at bedtime  -     SUMAtriptan (IMITREX) 100 mg tablet; Take 1 tablet by mouth  -     traZODone (DESYREL) 100 mg tablet; 200 mg daily at bedtime          Vitals:    02/09/18 0907   BP: 146/90   Pulse: 69   Resp: 20   Temp: 98 6 °F (37 °C)   SpO2: 98%       Subjective:   Pt complaining of tightness in chest, coughing and chills  Pt has been seen at e/r diag with pnuemonia 1/31/18  Pt not feeling better   Patient ID: Aline Schumacher is a 56 y o  female  Patient was seen in ER 01/31/2018 and diagnosed with left lingular pneumonia -treated and released with 5 day course of azithromycin and 5 day course of prednisone  Patient states started to feel better however symptoms got worse 5 days ago  Patient does not have nebulizer solution at home nor does she have a rescue inhaler  Patient complaints are persistent cough, shortness of breath, orthopnea and actively wheezing  Patient reports taking her Symbicort twice a day however is running out    She has long-standing theophyline  of which she still continues to take at night  She does not drive - using transit bus and walked to get here  She has a 12year old son at home  She was recently incarcerated due to allegedly stealing from [facility]  The following portions of the patient's history were reviewed and updated as appropriate: past family history, past medical history, past social history and past surgical history  Review of Systems   Constitutional: Positive for activity change and fatigue  HENT: Positive for congestion  Eyes: Negative  Respiratory: Positive for cough, shortness of breath and wheezing  Cardiovascular: Negative  Gastrointestinal: Negative  Endocrine: Negative  Musculoskeletal: Negative  Neurological: Negative for weakness and light-headedness  Hematological: Negative  Objective:     Physical Exam   Constitutional: She is oriented to person, place, and time  She appears well-developed  She appears distressed (coughing)  HENT:   Head: Normocephalic  Right Ear: External ear normal    Left Ear: External ear normal    Eyes: Pupils are equal, round, and reactive to light  Cardiovascular: Normal rate and regular rhythm  tachycardia   Pulmonary/Chest: She is in respiratory distress (persistent cough)  She has wheezes  She has rales  Pt coughing - lungs w/ rhonchi and wheezing - rhonchi clearing after cough - but end exp wheezing remain; rales rll - no rales over lingular area  Aeration ok  Sat 98%   Neurological: She is alert and oriented to person, place, and time  Skin: Skin is warm and dry  Psychiatric: She has a normal mood and affect

## 2018-02-09 NOTE — DISCHARGE INSTRUCTIONS
COPD (Chronic Obstructive Pulmonary Disease)   WHAT YOU NEED TO KNOW:   Chronic obstructive pulmonary disease (COPD) is a lung disease that makes it hard for you to breathe  It is usually a result of lung damage caused by years of irritation and inflammation in your lungs  DISCHARGE INSTRUCTIONS:   Call 911 if:   · You feel lightheaded, short of breath, and have chest pain  Return to the emergency department if:   · You are confused, dizzy, or feel faint  · Your arm or leg feels warm, tender, and painful  It may look swollen and red  · You cough up blood  Contact your healthcare provider if:   · You have more shortness of breath than usual      · You need more medicine than usual to control your symptoms  · You are coughing or wheezing more than usual      · You are coughing up more mucus, or it is a different color or has a different odor  · You gain more than 3 pounds in a week  · You have a fever, a runny or stuffy nose, and a sore throat, or other cold or flu symptoms  · Your skin, lips, or nails start to turn blue  · You have swelling in your legs or ankles  · You are very tired or weak for more than a day  · You notice changes in your mood, or changes in your ability to think or concentrate  · You have questions or concerns about your condition or care  Medicines:   · Medicines  may be used to open your airways, decrease swelling and inflammation in your lungs, or treat an infection  You may need 2 or more medicines  A short-acting medicine relieves symptoms quickly  Long-acting medicines will control or prevent symptoms  Ask for more information about the medicines you are given and how to use them safely  · Take your medicine as directed  Contact your healthcare provider if you think your medicine is not helping or if you have side effects  Tell him or her if you are allergic to any medicine  Keep a list of the medicines, vitamins, and herbs you take  Include the amounts, and when and why you take them  Bring the list or the pill bottles to follow-up visits  Carry your medicine list with you in case of an emergency  Help make breathing easier:   · Use pursed-lip breathing any time you feel short of breath  Take a deep breath in through your nose  Slowly breathe out through your mouth with your lips pursed for twice as long as you inhaled  You can also practice this breathing pattern while you bend, lift, climb stairs, or exercise  It slows down your breathing and helps move more air in and out of your lungs  · Do not smoke, and avoid others who smoke  Nicotine and other substances can cause lung irritation or damage and make it harder for you to breathe  Do not use e-cigarettes or smokeless tobacco  They still contain nicotine  Ask your healthcare provider for information if you currently smoke and need help to quit  For support and more information:  ¨ [employer]  Phone: 7- 255 - 474-3884  Web Address: [employer]      · Be aware of and avoid anything that makes your symptoms worse  Stay out of high altitudes and places with high humidity  Stay inside, or cover your mouth and nose with a scarf when you are outside during cold weather  Stay inside on days when air pollution or pollen counts are high  Do not use aerosol sprays such as deodorant, bug spray, and hair spray  Manage COPD and help prevent exacerbations:  COPD is a serious condition that gets worse over time  A COPD exacerbation means your symptoms suddenly get worse  It is important to prevent exacerbations  An exacerbation can cause more lung damage  COPD cannot be cured, but you can take action to feel better and prevent COPD exacerbations:  · Protect yourself from germs  Germs can get into your lungs and cause an infection  An infection in your lungs can create more mucus and make it harder to breathe   An infection can also create swelling in your airways and prevent air from getting in  You can decrease your risk for infection by doing the following:     [facility] your hands often with soap and water  Carry germ-killing gel with you  You can use the gel to clean your hands when soap and water are not available  ¨ Do not touch your eyes, nose, or mouth unless you have washed your hands first      ¨ Always cover your mouth when you cough  Cough into a tissue or your shirtsleeve so you do not spread germs from your hands  ¨ Try to avoid people who have a cold or the flu  If you are sick, stay away from others as much as possible  · Drink more liquids  This will help to keep your air passages moist and help you cough up mucus  Ask how much liquid to drink each day and which liquids are best for you  · Exercise daily  Exercise for at least 20 minutes each day to help increase your energy and decrease shortness of breath  Walking or riding a bike are good ways to exercise  Talk to your healthcare provider about the best exercise plan for you  · Ask about vaccines  Your healthcare provider may recommend that you get regular flu and pneumonia vaccines  Pneumonia can become life-threatening for a person who has COPD  Ask about other vaccines you may need  Ask your healthcare provider about the flu and pneumonia vaccines  All adults should get the flu (influenza) vaccine every year as soon as it becomes available  The pneumonia vaccine is given to adults aged 72 or older to prevent pneumococcal disease, such as pneumonia  Adults aged 23 to 59 years who are at high risk for pneumococcal disease also should get the pneumococcal vaccine  It may need to be repeated 1 or 5 years later  Pulmonary rehabilitation:  Your healthcare provider may recommend a program to help you manage your symptoms and improve your quality of life  It may include nutritional counseling and exercise to strengthen your lungs     Make decisions about your choices for future treatment:  Ask for information about advanced medical directives and living ballesteros  These documents help you decide and write down your choices for treatment and end-of-life care  It is best to complete them when you feel well and can think clearly about your wishes  The information can then be kept for future use if you are in the hospital or become very ill  Follow up with your healthcare provider as directed: You may need more tests  Your healthcare provider may refer you to a pulmonary (lung) specialist  Write down your questions so you remember to ask them during your visits  © 2017 [facility] Information is for End User's use only and may not be sold, redistributed or otherwise used for commercial purposes  All illustrations and images included in CareNotes® are the copyrighted property of [employer] M [employer]  or [employer]  The above information is an  only  It is not intended as medical advice for individual conditions or treatments  Talk to your doctor, nurse or pharmacist before following any medical regimen to see if it is safe and effective for you

## 2018-02-09 NOTE — ED PROVIDER NOTES
History  Chief Complaint   Patient presents with    Shortness of Breath     Pt states diagnosed with pneumonia on   Pt states not improving, went to see family physician and sent here for further evaluation     HPI  80-year-old female with history of COPD and MS presents to the emergency department with complaints of cough, chest tightness, JARAMILLO, and subjective chills  Patient states that she was seen in the emergency department on  when symptoms first began and that she was diagnosed with pneumonia  She was discharge home with Z-pack and prednisone, but despite medication compliance, symptoms have persisted  She was seen by her PCP today and referred to the ED for further evaluation  She mentions that she has a nebulizer at home, but that she has been out of albuterol for quite some time  On ROS, patient complains of fatigue and wheezing  She denies any chest pain, abdominal pain, change in urinary/bowel function, lower extremity edema, history of or risk factor for VTE, or complaints other than stated above  Prior to Admission Medications   Prescriptions Last Dose Informant Patient Reported? Taking? Glatiramer Acetate (COPAXONE) 40 MG/ML SOSY Past Month at Unknown time Self Yes Yes   Sig: Inject under the skin 3 (three) times a week  SUMAtriptan (IMITREX) 100 mg tablet  Self Yes Yes   Sig: Take 1 tablet by mouth   albuterol (ACCUNEB) 1 25 MG/3ML nebulizer solution   No Yes   Sig: Take 3 mL (1 25 mg total) by nebulization every 6 (six) hours as needed for wheezing   budesonide-formoterol (SYMBICORT) 160-4 5 mcg/act inhaler 2018 at Unknown time  No Yes   Sig: Inhale 2 puffs 2 (two) times a day   theophylline (VIKA-24) 100 MG 24 hr capsule 2018 at Unknown time Self Yes Yes   Sig: Take 100 mg by mouth daily     traZODone (DESYREL) 100 mg tablet 2018 at Unknown time Self Yes Yes   Si mg daily at bedtime      Facility-Administered Medications Last Administration Doses Remaining albuterol (PROVENTIL HFA,VENTOLIN HFA) inhaler 2 puff None recorded           Past Medical History:   Diagnosis Date    Asthma     COPD [facility]chronic obstructive pulmonary disease) [facility] )     Multiple sclerosis ([facility] )        History reviewed  No pertinent surgical history  History reviewed  No pertinent family history  I have reviewed and agree with the history as documented  Social History   Substance Use Topics    Smoking status: Former Smoker    Smokeless tobacco: Never Used    Alcohol use No        Review of Systems   Constitutional: Positive for chills and fatigue  Negative for fever  HENT: Negative for trouble swallowing  Eyes: Negative for visual disturbance  Respiratory: Positive for cough and shortness of breath  Cardiovascular: Negative for chest pain  Gastrointestinal: Negative for abdominal pain, nausea and vomiting  Genitourinary: Negative for dysuria and hematuria  Musculoskeletal: Negative for back pain  Skin: Negative for rash  Allergic/Immunologic: Positive for immunocompromised state  Neurological: Negative for headaches  Hematological: Does not bruise/bleed easily  Psychiatric/Behavioral: The patient is not nervous/anxious  All other systems reviewed and are negative        Physical Exam  ED Triage Vitals   Temperature Pulse Respirations Blood Pressure SpO2   02/09/18 1026 02/09/18 1026 02/09/18 1026 02/09/18 1026 02/09/18 1026   98 3 °F (36 8 °C) 67 22 (!) 172/95 97 %      Temp Source Heart Rate Source Patient Position - Orthostatic VS BP Location FiO2 (%)   02/09/18 1026 02/09/18 1126 02/09/18 1126 02/09/18 1026 --   Oral Monitor Lying Left arm       Pain Score       02/09/18 1026       8           Orthostatic Vital Signs  Vitals:    02/09/18 1226 02/09/18 1315 02/09/18 1400 02/09/18 1526   BP: 145/81 125/65 130/71 133/59   Pulse: 75 68 82 97   Patient Position - Orthostatic VS: Lying   Lying       Physical Exam   Constitutional: She is oriented to person, place, and time  She appears well-nourished  No distress  HENT:   Head: Normocephalic and atraumatic  Eyes: EOM are normal    Neck: Normal range of motion  Neck supple  Cardiovascular: Normal rate and regular rhythm  Pulmonary/Chest: Effort normal  No respiratory distress  She has wheezes  Abdominal: Soft  She exhibits no distension  There is no tenderness  Musculoskeletal: Normal range of motion  Neurological: She is alert and oriented to person, place, and time  Skin: Skin is warm and dry  She is not diaphoretic  Psychiatric: She has a normal mood and affect  Her behavior is normal    Nursing note and vitals reviewed        ED Medications  Medications   ibuprofen (MOTRIN) tablet 400 mg (400 mg Oral Given 2/9/18 1133)   ipratropium-albuterol (DUO-NEB) 0 5-2 5 mg/3 mL inhalation solution 3 mL (3 mL Nebulization Given 2/9/18 1318)   sodium chloride 0 9 % bolus 1,000 mL (0 mL Intravenous Stopped 2/9/18 1708)   methylPREDNISolone sodium succinate (Solu-MEDROL) injection 125 mg (125 mg Intravenous Given 2/9/18 1343)   magnesium sulfate 2 g/50 mL IVPB (premix) 2 g (0 g Intravenous Stopped 2/9/18 1708)   albuterol inhalation solution 10 mg (10 mg Nebulization Given 2/9/18 1330)   ipratropium (ATROVENT) 0 02 % inhalation solution 1 mg (1 mg Nebulization Given 2/9/18 1330)   sodium chloride 0 9 % inhalation solution 3 mL (12 mL Nebulization Given 2/9/18 1330)       Diagnostic Studies  Results Reviewed     Procedure Component Value Units Date/Time    Theophylline level [[id number]]  (Abnormal) Collected:  02/09/18 1337    Lab Status:  Final result Specimen:  Blood from Arm, Left Updated:  02/09/18 1417     Theophylline Lvl 3 5 (L) ug/mL     Basic metabolic panel [[id number]] Collected:  02/09/18 1337    Lab Status:  Final result Specimen:  Blood from Arm, Left Updated:  02/09/18 1402     Sodium 142 mmol/L      Potassium 3 6 mmol/L      Chloride 106 mmol/L      CO2 30 mmol/L      Anion Gap 6 mmol/L      BUN 6 mg/dL      Creatinine 0 72 mg/dL      Glucose 130 mg/dL      Calcium 8 9 mg/dL      eGFR 95 ml/min/1 73sq m     Narrative:         National Kidney Disease Education Program recommendations are as follows:  GFR calculation is accurate only with a steady state creatinine  Chronic Kidney disease less than 60 ml/min/1 73 sq  meters  Kidney failure less than 15 ml/min/1 73 sq  meters  CBC and differential [[id number]]  (Normal) Collected:  02/09/18 1337    Lab Status:  Final result Specimen:  Blood from Arm, Left Updated:  02/09/18 1352     WBC 8 31 Thousand/uL      RBC 4 56 Million/uL      Hemoglobin 12 9 g/dL      Hematocrit 39 5 %      MCV 87 fL      MCH 28 3 pg      MCHC 32 7 g/dL      RDW 12 9 %      MPV 9 7 fL      Platelets 961 Thousands/uL      nRBC 0 /100 WBCs      Neutrophils Relative 62 %      Lymphocytes Relative 27 %      Monocytes Relative 6 %      Eosinophils Relative 5 %      Basophils Relative 0 %      Neutrophils Absolute 5 21 Thousands/µL      Lymphocytes Absolute 2 20 Thousands/µL      Monocytes Absolute 0 46 Thousand/µL      Eosinophils Absolute 0 41 Thousand/µL      Basophils Absolute 0 01 Thousands/µL                  XR chest 2 views   ED Interpretation by Rhiannon Howell MD (02/09 1343)   Abnormal   The CXR was ordered by resident and interpreted by me independently  On my read, it appears:   - peribronchial cuffing      Final Result by Javi Lynn MD (02/09 1342)      No active pulmonary disease        Workstation performed: [id number]               Procedures  Procedures      Phone Consults  ED Phone Contact    ED Course  ED Course as of Feb 12 2354 Fri Feb 09, 2018   1222 Cough since 31st, dx pneumonia  Z-pack given, no improvement   Cant breath - copd  Wheezing  Chills, headache, chest pain - like COPD  No fevers, abd pain, nausea, vomiting   Ran out of albuterol nebs  Last on steroids on 31 -5     1420 THEOPHYLLINE LEVEL: (!) 3 5                               MDM  Number of Diagnoses or Management Options  COPD (chronic obstructive pulmonary disease) ([facility] ):   Viral syndrome:   Diagnosis management comments: 51-year-old female with COPD and iatrogenic immunodeficiency presenting with persistent cough, chest tightness, wheeze x 10 days  No improvement after abx  Will treat sx, obtain labs, repeat CXR  Dispo pending  CritCare Time    Disposition  Final diagnoses:   COPD (chronic obstructive pulmonary disease) ([facility] )   Viral syndrome     Time reflects when diagnosis was documented in both MDM as applicable and the Disposition within this note     Time User Action Codes Description Comment    2/9/2018  3:27 PM Chema Sharif [J44 9] COPD (chronic obstructive pulmonary disease) ([place] [address] )     2/9/2018  3:27 PM Sarina Traylor [B34 9] Viral syndrome     2/9/2018  3:28 PM Sarina Traylor [J18 1] Community acquired pneumonia of left upper lobe of lung ([place] [address] )     2/9/2018  3:28 PM Sarina Traylor [J45 41] Moderate persistent asthma with acute exacerbation       ED Disposition     ED Disposition Condition Comment    Discharge  [address] discharge to home/self care      Condition at discharge: Good        Follow-up Information     Follow up With Specialties Details Why [address], [address] [facility]  As needed, If symptoms worsen [address]  [address] [facility] [address]  [phone]          Discharge Medication List as of 2/9/2018  3:29 PM      CONTINUE these medications which have CHANGED    Details   albuterol (ACCUNEB) 1 25 MG/3ML nebulizer solution Take 3 mL (1 25 mg total) by nebulization every 6 (six) hours as needed for wheezing, Starting Fri 2/9/2018, Normal      theophylline (VIKA-24) 100 MG 24 hr capsule Take 1 capsule (100 mg total) by mouth daily, Starting Fri 2/9/2018, Normal         CONTINUE these medications which have NOT CHANGED    Details   budesonide-formoterol (SYMBICORT) 160-4 5 mcg/act inhaler Inhale 2 puffs 2 (two) times a day, Starting Fri 2/9/2018, Sample      Glatiramer Acetate (COPAXONE) 40 MG/ML SOSY Inject under the skin 3 (three) times a week , Historical Med      SUMAtriptan (IMITREX) 100 mg tablet Take 1 tablet by mouth, Starting Tue 11/1/2011, Historical Med      traZODone (DESYREL) 100 mg tablet 200 mg daily at bedtime, Starting Sun 1/28/2018, Historical Med           No discharge procedures on file  ED Provider  Attending physically available and evaluated Samy De  ALY managed the patient along with the ED Attending      Electronically Signed by         Parker Sadler MD  02/12/18 4485

## 2018-03-01 DIAGNOSIS — E78.00 PURE HYPERCHOLESTEROLEMIA: ICD-10-CM

## 2018-03-01 DIAGNOSIS — M79.7 FIBROMYALGIA: ICD-10-CM

## 2018-03-01 DIAGNOSIS — J44.9 CHRONIC OBSTRUCTIVE PULMONARY DISEASE (HCC): ICD-10-CM

## 2018-03-02 ENCOUNTER — HOSPITAL ENCOUNTER (EMERGENCY)
Facility: HOSPITAL | Age: 58
Discharge: HOME | End: 2018-03-02
Attending: EMERGENCY MEDICINE | Admitting: EMERGENCY MEDICINE
Payer: COMMERCIAL

## 2018-03-02 VITALS
RESPIRATION RATE: 18 BRPM | DIASTOLIC BLOOD PRESSURE: 79 MMHG | SYSTOLIC BLOOD PRESSURE: 129 MMHG | WEIGHT: 168 LBS | BODY MASS INDEX: 31.74 KG/M2 | OXYGEN SATURATION: 96 % | TEMPERATURE: 97.9 F | HEART RATE: 87 BPM

## 2018-03-02 DIAGNOSIS — L02.511 ABSCESS OF RIGHT RING FINGER: Primary | ICD-10-CM

## 2018-03-02 PROCEDURE — 99282 EMERGENCY DEPT VISIT SF MDM: CPT

## 2018-03-02 RX ORDER — IBUPROFEN 400 MG/1
400 TABLET ORAL ONCE
Status: COMPLETED | OUTPATIENT
Start: 2018-03-02 | End: 2018-03-02

## 2018-03-02 RX ORDER — ACETAMINOPHEN 325 MG/1
975 TABLET ORAL ONCE
Status: COMPLETED | OUTPATIENT
Start: 2018-03-02 | End: 2018-03-02

## 2018-03-02 RX ADMIN — ACETAMINOPHEN 975 MG: 325 TABLET, FILM COATED ORAL at 18:48

## 2018-03-02 RX ADMIN — IBUPROFEN 400 MG: 400 TABLET, FILM COATED ORAL at 18:48

## 2018-03-02 NOTE — DISCHARGE INSTRUCTIONS
Facial Laceration   WHAT YOU NEED TO KNOW:   A facial laceration is a tear or cut in the skin caused by blunt or shearing forces, or sharp objects  Facial lacerations may be closed within 24 hours of injury  DISCHARGE INSTRUCTIONS:   Return to the emergency department if:   · You have a fever and the wound is painful, warm, or swollen  The wound area may be red, or fluid may come out of it  · You have heavy bleeding or bleeding that does not stop after 10 minutes of holding firm, direct pressure over the wound  Contact your healthcare provider if:   · Your wound reopens or your tape comes off  · Your wound is very painful  · Your wound is not healing, or you think there is an object in the wound  · The skin around your wound stays numb  · You have questions or concerns about your condition or care  Medicines:   · Antibiotics  may be given to prevent an infection if your wound was deep and had to be cleaned out  · Take your medicine as directed  Contact your healthcare provider if you think your medicine is not helping or if you have side effects  Tell him of her if you are allergic to any medicine  Keep a list of the medicines, vitamins, and herbs you take  Include the amounts, and when and why you take them  Bring the list or the pill bottles to follow-up visits  Carry your medicine list with you in case of an emergency  Care for your wound:  Care for your wound as directed to prevent infection and help it heal  Wash your hands with soap and warm water before and after you care for your wound  You may need to keep the wound dry for the first 24 to 48 hours  When your healthcare provider says it is okay, wash around your wound with soap and water, or as directed  Gently pat the area dry  Do not use alcohol or hydrogen peroxide to clean your wound unless you are directed to  · Do not take aspirin or NSAIDs for 24 hours after being injured  Aspirin and NSAIDs can increase blood flow  Your laceration may continue to bleed  · Do not take hot showers, eat or drink hot foods and liquids for 48 hours after being injured  Also, do not use a heating pad near your laceration  The heat can cause swelling in and around your laceration  · If your wound was covered with a bandage,  leave your bandage on as long as directed  Bandages keep your wound clean and protected  They can also prevent swelling  Ask when and how to change your bandage  Be careful not to apply the bandage or tape too tightly  This could cut off blood flow and cause more injury  · If your wound was closed with stitches,  keep your wound clean  Your healthcare provider may recommend that you apply antibiotic ointment after you clean your wound  · If your wound was closed with wound tape or medical strips,  keep the area clean and dry  The strips will usually fall off on their own after several days  · If your wound was closed with tissue glue,  do not use any ointments or lotions on the area  You may shower, but do not swim or soak in a bathtub  Gently pat the area dry after you take a shower  Do not pick at or scrub the glue area  Decrease scarring: The skin in the area of your wound may turn a different color if it is exposed to direct sunlight  After your wound is healed, use sunscreen over the area when you are out in the sun  You should do this for at least 6 months to 1 year after your injury  Some wounds scar less if they are covered while they heal   Follow up with your healthcare provider as directed: You may need to follow up with your healthcare provider in 24 to 48 hours to have your wound checked for infection  You may need to return in 3 to 5 days if you have stitches that need to be removed  Write down your questions so you remember to ask them during your visits    © 2017 [employer] Information is for End User's use only and may not be sold, redistributed or otherwise used for commercial purposes  All illustrations and images included in CareNotes® are the copyrighted property of [employer]ARY [employer] , [employer]  or Jose Maria Millard  The above information is an  only  It is not intended as medical advice for individual conditions or treatments  Talk to your doctor, nurse or pharmacist before following any medical regimen to see if it is safe and effective for you  Skin Adhesive Care   WHAT YOU NEED TO KNOW:   Skin adhesive is medical glue used to close wounds  It is a substitute for staples and stitches  Skin adhesive wound closures take less time and do not require anesthesia  You have less pain and a lower risk of infection than with staples or stitches  Skin adhesive will fall off after the wound is healed  DISCHARGE INSTRUCTIONS:   Self-care:   · Keep your wound clean and dry  for 1 to 5 days  You can shower 24 hours after the skin adhesive is applied  Lightly pat your wound dry after you shower  · Do not soak  your wound in water, such as in a bath or hot tub  · Do not scrub  your wound or pick at the adhesive  This can make your wound reopen  · Do not apply ointments  to your wound  These include antibiotic and other ointments that contain petroleum jelly  These products will remove skin adhesive and reopen your wound  Follow up with your healthcare provider as directed:  Write down your questions so you remember to ask them during your visits  Contact your healthcare provider if:   · You have a fever  · Your wound is red and warm to touch  · You have questions or concerns about your condition or care  Return to the emergency department if:   · Your wound has fluid draining from it  · Your wound opens  © 2017 [employer]  Information is for End User's use only and may not be sold, redistributed or otherwise used for commercial purposes   All illustrations and images included in CareNotes® are the copyrighted property of [employer]ARY [employer] , [employer]  or Jose Maria Millard  The above information is an  only  It is not intended as medical advice for individual conditions or treatments  Talk to your doctor, nurse or pharmacist before following any medical regimen to see if it is safe and effective for you

## 2018-03-02 NOTE — ED PROVIDER NOTES
History  Chief Complaint   Patient presents with    Finger Laceration     cut right 4th finger on knife     This is a 56 y o  old female who presents to the ED for evaluation of finger injury  This is a left-hand dominant female who cut her right 4th finger with a knife  Has a 1 2 cm laceration on the distal right 4th finger  It is hemostatic  Last tetanus shot 5 years ago  Full range of motion  No nail involvement  Otherwise, patient denies fevers, chills, night sweats, cough, congestion, rhinorrhea, CP, dyspnea, abdominal pain, nausea, vomiting, diarrhea, constipation, urinary symptoms, leg pain or swelling  Prior to Admission Medications   Prescriptions Last Dose Informant Patient Reported? Taking? Glatiramer Acetate (COPAXONE) 40 MG/ML SOSY  Self Yes No   Sig: Inject under the skin 3 (three) times a week     SUMAtriptan (IMITREX) 100 mg tablet  Self Yes No   Sig: Take 1 tablet by mouth   albuterol (ACCUNEB) 1 25 MG/3ML nebulizer solution   No No   Sig: Take 3 mL (1 25 mg total) by nebulization every 6 (six) hours as needed for wheezing   budesonide-formoterol (SYMBICORT) 160-4 5 mcg/act inhaler   No No   Sig: Inhale 2 puffs 2 (two) times a day   theophylline (VIKA-24) 100 MG 24 hr capsule   No No   Sig: Take 1 capsule (100 mg total) by mouth daily   traZODone (DESYREL) 100 mg tablet  Self Yes No   Si mg daily at bedtime      Facility-Administered Medications Last Administration Doses Remaining   albuterol (PROVENTIL HFA,VENTOLIN HFA) inhaler 2 puff None recorded         Past Medical History:   Diagnosis Date    Asthma     Asthma with COPD ([facility] )     last assessed-2017    Closed fracture of fourth lumbar vertebra (HCC)     unspecified fracture morphology, initial [id number]-[id number]-1/31/2017    [facility]     Heart murmur     last assessed-2012    Laceration of finger     last assessed-2014    Lipoma     last assessed-5/3/2013    Migraine     last assessed-1/16/2013    Multiple sclerosis (HCC)     last assessed-11/14/20172363-[id number]    Obstructive sleep apnea     last assessed-9/23/2013    Osteoarthritis     last assessed-5/4/2012    Post traumatic stress disorder     last assessed-12/13/2013    Tenosynovitis of thumb     left thumb-septic-last assessed-3/26/2014    Weight gain     last assessed-10/17/2013-50lb in about a year with frequent steroids     Past Surgical History:   Procedure Laterality Date    KNEE ARTHROSCOPY W/ MENISCAL REPAIR Right     description: 4/2012-KNEE ARTHROSCOPY W/ MEDIAL MENISCAL REPAIR      Family History   Problem Relation Age of Onset    Hepatitis Mother      C    Asthma Father     COPD Father     Diabetes Father     Hypertension Father     Asthma Brother      I have reviewed and agree with the history as documented  Social History   Substance Use Topics    Smoking status: Former Smoker    Smokeless tobacco: Never Used      Comment: per allscripts, current occasional smoker-1/2 pack every 3-4 days x 2 years-former heavy smoker    Alcohol use No      Review of Systems   Constitutional: Negative for chills, fatigue, fever and unexpected weight change  HENT: Negative for congestion, rhinorrhea and sore throat  Eyes: Negative for redness and visual disturbance  Respiratory: Negative for cough and shortness of breath  Cardiovascular: Negative for chest pain and leg swelling  Gastrointestinal: Negative for abdominal pain, constipation, diarrhea, nausea and vomiting  Endocrine: Negative for cold intolerance and heat intolerance  Genitourinary: Negative for dysuria, frequency and urgency  Musculoskeletal: Negative for back pain  Skin: Negative for rash  Neurological: Negative for dizziness, syncope and numbness  All other systems reviewed and are negative      Physical Exam  ED Triage Vitals [03/02/18 1826]   Temperature Pulse Respirations Blood Pressure SpO2   97 9 °F (36 6 °C) 87 18 129/79 96 %      Temp Source Heart Rate Source Patient Position - Orthostatic VS BP Location FiO2 (%)   Oral -- -- -- --      Pain Score       3         Physical Exam   Constitutional: She is oriented to person, place, and time  She appears well-developed and well-nourished  No distress  HENT:   Head: Normocephalic and atraumatic  Neck: Normal range of motion  Pulmonary/Chest: Effort normal  No stridor  No respiratory distress  Musculoskeletal: Normal range of motion  Hands:  Neurological: She is alert and oriented to person, place, and time  Moving all extremities equally   Skin: Skin is warm and dry  No rash noted  She is not diaphoretic  Psychiatric: She has a normal mood and affect  Nursing note and vitals reviewed  ED Medications  Medications   ibuprofen (MOTRIN) tablet 400 mg (400 mg Oral Given 3/2/18 1848)   acetaminophen (TYLENOL) tablet 975 mg (975 mg Oral Given 3/2/18 1848)     Diagnostic Studies  Results Reviewed     None        No orders to display     Procedures  Lac Repair  Date/Time: 3/2/2018 6:45 PM  Performed by: Mathew Bates  Authorized by: Mathew Bates   Consent: Verbal consent obtained  Risks and benefits: risks, benefits and alternatives were discussed  Consent given by: patient  Patient identity confirmed: verbally with patient and arm band  Time out: Immediately prior to procedure a "time out" was called to verify the correct patient, procedure, equipment, support staff and site/side marked as required    Body area: upper extremity  Location details: right ring finger  Laceration length: 1 2 cm  Tendon involvement: none  Nerve involvement: none  Vascular damage: no      Procedure Details:  Skin closure: glue  Dressing: splint  Patient tolerance: Patient tolerated the procedure well with no immediate complications  Comments: Neurovascular exam status post finger stack splint placement is normal         Phone [address]  ED Phone Contact    ED Course    A/P: This is a 56 y o  female who presents to the ED for evaluation of finger injury  Lac repaired  Tetanus is up to date  Will provide motrin/tylenol for pain  Splint  I personally discussed return precautions with this patient  I provided the patient with written discharge instructions and particularly highlighted specific areas of interest to this patient, including but not limited to: medications for symptom managment, follow up recommendations, and return precautions  Patient is in agreement with this plan as outlined above  [facility]  CritCare Time    Disposition  Final diagnoses:   Abscess of right ring finger     Time reflects when diagnosis was documented in both MDM as applicable and the Disposition within this note     Time User Action Codes Description Comment    3/2/2018  6:50 PM Lillian Nam Add [L02 [id number]] Abscess of right ring finger       ED Disposition     ED Disposition Condition Comment    Discharge  Campbell Duran discharge to home/self care  Condition at discharge: Stable        Follow-up Information     Follow up With Specialties Details Why [address], [address] [facility] Medicine Go in 2 days As needed, For wound re-check [address]  [address] [facility]  [facility]  [phone]          Patient's Medications   Discharge Prescriptions    No medications on file     No discharge procedures on file  ED Provider  Attending physically available and evaluated Campbell Duran  I managed the patient along with the ED Attending      Electronically Signed by         Binh Prasad MD  03/02/18 5969

## 2018-03-02 NOTE — ED ATTENDING ATTESTATION
Ole Aiken MD, saw and evaluated the patient  All available labs and X-rays were ordered by me or the resident and have been reviewed by myself  I discussed the patient with the resident / non-physician and agree with the resident's / non-physician practitioner's findings and plan as documented in the resident's / non-physician practicitioner's note, except where noted  At this point, I agree with the current assessment done in the ED  Chief Complaint   Patient presents with    Finger Laceration     cut right 4th finger on knife     This is a 56year old female presenting for evaluation of left 4th finger pad laceration  She accidentally cut her pad with a knife  Tetanus up to date within 5 years  No numbness/tingling distally  PE:  Vitals:    03/02/18 1826   BP: 129/79   Pulse: 87   Resp: 18   Temp: 97 9 °F (36 6 °C)   TempSrc: Oral   SpO2: 96%   Weight: 76 2 kg (168 lb)   General: VS reviewed  Appears in NAD  awake, alert  Well-nourished, well-developed  Appears stated age  Speaking normally in full sentences  Head: Normocephalic, atraumatic, nontender  Eyes: EOM-I  No diplopia  No hyphema  No subconjunctival hemorrhages  Symmetrical lids  ENT: Atraumatic external nose and ears  MMM  No malocclusion  No stridor  Normal phonation  No drooling  Normal swallowing  Neck: No JVD  CV: No pallor noted  Peripheral pulses +2 throughout  No chest wall tenderness  Lungs:   No tachypnea  No respiratory distress  MSK:   FROM   LEFT:  M/U/R/A nerve sensation intact   strength 5/5  Finger abduction/adduction/opposition intact  Suppination/Pronation intact without reproduction of pain  Good capillary refill  2+ radial pulses, bilaterally equal    There is a small flap with base oriented proximally  Skin: Dry, intact  Neuro: Awake, alert, GCS15, CN II-XII grossly intact  Motor grossly intact    Psychiatric/Behavioral: Appropriate mood and affect   Exam: deferred  A/P:  - Lac --> glue  - f/u PCP  - 13 point ROS was performed and all are normal unless stated in the history above  - Nursing note reviewed  Vitals reviewed  - Orders placed by myself and/or advanced practitioner / resident     - Previous chart was not reviewed  - No language barrier    - History obtained from patient  - There are no limitations to the history obtained  - Critical care time: Not applicable for this patient  Final Diagnosis:  1  Abscess of right ring finger      ED Course      Medications   ibuprofen (MOTRIN) tablet 400 mg (400 mg Oral Given 3/2/18 1848)   acetaminophen (TYLENOL) tablet 975 mg (975 mg Oral Given 3/2/18 1848)     No orders to display     Orders Placed This Encounter   Procedures    Laceration repair     Labs Reviewed - No data to display  Time reflects when diagnosis was documented in both MDM as applicable and the Disposition within this note     Time User Action Codes Description Comment    3/2/2018  6:50 PM Jeanine Lozano Add [L02 [id number]] Abscess of right ring finger       ED Disposition     ED Disposition Condition Comment    Discharge  [address] discharge to home/self care  Condition at discharge: Stable        Follow-up Information     Follow up With Specialties Details Why Contact Info    Valerie Parham, [facility] Go in 2 days As needed, For wound re-check [address]  [address]  [phone]          Discharge Medication List as of 3/2/2018  6:51 PM      CONTINUE these medications which have NOT CHANGED    Details   albuterol (ACCUNEB) 1 25 MG/3ML nebulizer solution Take 3 mL (1 25 mg total) by nebulization every 6 (six) hours as needed for wheezing, Starting Fri 2/9/2018, Normal      budesonide-formoterol (SYMBICORT) 160-4 5 mcg/act inhaler Inhale 2 puffs 2 (two) times a day, Starting Fri 2/9/2018, Sample      Glatiramer Acetate (COPAXONE) 40 MG/ML SOSY Inject under the skin 3 (three) times a week  , Historical Med      SUMAtriptan (IMITREX) 100 mg tablet Take 1 tablet by mouth, Starting 2011, Historical Med      theophylline (VIKA-24) 100 MG 24 hr capsule Take 1 capsule (100 mg total) by mouth daily, Starting 2018, Normal      traZODone (DESYREL) 100 mg tablet 200 mg daily at bedtime, Starting Sun 2018, Historical Med           No discharge procedures on file  Prior to Admission Medications   Prescriptions Last Dose Informant Patient Reported? Taking? Glatiramer Acetate (COPAXONE) 40 MG/ML SOSY  Self Yes No   Sig: Inject under the skin 3 (three) times a week  SUMAtriptan (IMITREX) 100 mg tablet  Self Yes No   Sig: Take 1 tablet by mouth   albuterol (ACCUNEB) 1 25 MG/3ML nebulizer solution   No No   Sig: Take 3 mL (1 25 mg total) by nebulization every 6 (six) hours as needed for wheezing   budesonide-formoterol (SYMBICORT) 160-4 5 mcg/act inhaler   No No   Sig: Inhale 2 puffs 2 (two) times a day   theophylline (VIKA-24) 100 MG 24 hr capsule   No No   Sig: Take 1 capsule (100 mg total) by mouth daily   traZODone (DESYREL) 100 mg tablet  Self Yes No   Si mg daily at bedtime      Facility-Administered Medications Last Administration Doses Remaining   albuterol (PROVENTIL HFA,VENTOLIN HFA) inhaler 2 puff None recorded           Portions of the record may have been created with voice recognition software  Occasional wrong word or "sound a like" substitutions may have occurred due to the inherent limitations of voice recognition software  Read the chart carefully and recognize, using context, where substitutions have occurred      Electronically signed by:  Zac Jin

## 2018-03-28 NOTE — PROGRESS NOTES
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  Mammo: needs  Colonoscopy: needs  Flzone decliines  PSV declines    No formal exercise        The following portions of the patient's history were reviewed and updated as appropriate: allergies, past social history, past surgical history and problem list     Review of Systems   Constitutional: Negative for activity change, appetite change, chills, fever and unexpected weight change  HENT: Negative  Negative for congestion  Eyes: Negative  Respiratory: Negative  Negative for cough, shortness of breath and wheezing  Cardiovascular: Negative  Negative for chest pain and leg swelling  Gastrointestinal: Negative  Endocrine: Negative  Negative for cold intolerance  Genitourinary: Negative  Musculoskeletal: Negative  Skin: Negative  Allergic/Immunologic: Negative  Neurological: Negative  Hematological: Negative  Psychiatric/Behavioral: Negative  Negative for sleep disturbance and suicidal ideas  The patient is not nervous/anxious  All other systems reviewed and are negative  Objective:  Vitals:    03/29/18 1027   BP: 102/72   Pulse: 72   Resp: 16   Temp: 97 9 °F (36 6 °C)   SpO2: 97%   Weight: 84 2 kg (185 lb 9 6 oz)   Height: 5' 0 63" (1 54 m)       /72   Pulse 72   Temp 97 9 °F (36 6 °C)   Resp 16   Ht 5' 0 63" (1 54 m)   Wt 84 2 kg (185 lb 9 6 oz)   SpO2 97%   BMI 35 50 kg/m²          Physical Exam   Constitutional: She is oriented to person, place, and time  She appears well-developed and well-nourished  No distress  HENT:   Head: Normocephalic  Right Ear: Tympanic membrane and external ear normal  No decreased hearing is noted  Left Ear: Tympanic membrane and external ear normal  No decreased hearing is noted  Mouth/Throat: Oropharynx is clear and moist    Eyes: Conjunctivae are normal  Pupils are equal, round, and reactive to light  Neck: Normal range of motion  Neck supple  No thyromegaly present  Cardiovascular: Normal rate, regular rhythm, normal heart sounds and intact distal pulses  No murmur heard  Pulmonary/Chest: Effort normal and breath sounds normal  No respiratory distress  She has no wheezes  She has no rales  Abdominal: Soft  Bowel sounds are normal    Musculoskeletal: Normal range of motion  Lymphadenopathy:     She has no cervical adenopathy  Neurological: She is alert and oriented to person, place, and time  She has normal reflexes  No cranial nerve deficit  Coordination normal    Skin: Skin is warm and dry  Psychiatric: She has a normal mood and affect   Her behavior is normal  Judgment and thought content normal

## 2018-03-29 ENCOUNTER — OFFICE VISIT (OUTPATIENT)
Dept: FAMILY MEDICINE CLINIC | Facility: CLINIC | Age: 58
End: 2018-03-29
Payer: COMMERCIAL

## 2018-03-29 VITALS
RESPIRATION RATE: 16 BRPM | WEIGHT: 185.6 LBS | HEIGHT: 61 IN | BODY MASS INDEX: 35.04 KG/M2 | HEART RATE: 72 BPM | DIASTOLIC BLOOD PRESSURE: 72 MMHG | TEMPERATURE: 97.9 F | SYSTOLIC BLOOD PRESSURE: 102 MMHG | OXYGEN SATURATION: 97 %

## 2018-03-29 DIAGNOSIS — Z12.11 SCREENING FOR COLON CANCER: ICD-10-CM

## 2018-03-29 DIAGNOSIS — Z00.00 HEALTH CARE MAINTENANCE: Primary | ICD-10-CM

## 2018-03-29 PROBLEM — R68.89 FLU-LIKE SYMPTOMS: Status: RESOLVED | Noted: 2017-03-16 | Resolved: 2018-03-29

## 2018-03-29 PROCEDURE — 99396 PREV VISIT EST AGE 40-64: CPT | Performed by: NURSE PRACTITIONER

## 2018-03-29 RX ORDER — THEOPHYLLINE ANHYDROUS 200 MG/1
CAPSULE, EXTENDED RELEASE ORAL
COMMUNITY
Start: 2018-02-27 | End: 2018-03-29 | Stop reason: SDUPTHER

## 2018-03-29 RX ORDER — ALBUTEROL SULFATE 2.5 MG/3ML
SOLUTION RESPIRATORY (INHALATION)
Refills: 0 | COMMUNITY
Start: 2018-02-09 | End: 2020-10-02 | Stop reason: SDUPTHER

## 2018-03-29 RX ORDER — OXYBUTYNIN CHLORIDE 5 MG/1
TABLET, EXTENDED RELEASE ORAL
COMMUNITY
Start: 2018-02-27 | End: 2018-07-24 | Stop reason: SDUPTHER

## 2018-05-10 ENCOUNTER — OFFICE VISIT (OUTPATIENT)
Dept: NEUROLOGY | Facility: CLINIC | Age: 58
End: 2018-05-10
Payer: COMMERCIAL

## 2018-05-10 VITALS
BODY MASS INDEX: 36.91 KG/M2 | HEART RATE: 66 BPM | HEIGHT: 60 IN | DIASTOLIC BLOOD PRESSURE: 62 MMHG | WEIGHT: 188 LBS | SYSTOLIC BLOOD PRESSURE: 111 MMHG | RESPIRATION RATE: 18 BRPM

## 2018-05-10 DIAGNOSIS — G35 MULTIPLE SCLEROSIS, RELAPSING-REMITTING (HCC): Primary | ICD-10-CM

## 2018-05-10 DIAGNOSIS — G89.4 CHRONIC PAIN SYNDROME: ICD-10-CM

## 2018-05-10 DIAGNOSIS — M79.7 FIBROMYALGIA: ICD-10-CM

## 2018-05-10 DIAGNOSIS — F32.A ANXIETY AND DEPRESSION: ICD-10-CM

## 2018-05-10 DIAGNOSIS — F43.10 PTSD (POST-TRAUMATIC STRESS DISORDER): ICD-10-CM

## 2018-05-10 DIAGNOSIS — F41.9 ANXIETY AND DEPRESSION: ICD-10-CM

## 2018-05-10 DIAGNOSIS — G43.909 MIGRAINE WITHOUT STATUS MIGRAINOSUS, NOT INTRACTABLE, UNSPECIFIED MIGRAINE TYPE: ICD-10-CM

## 2018-05-10 PROCEDURE — 99214 OFFICE O/P EST MOD 30 MIN: CPT | Performed by: PSYCHIATRY & NEUROLOGY

## 2018-05-10 RX ORDER — THEOPHYLLINE ANHYDROUS 200 MG/1
CAPSULE, EXTENDED RELEASE ORAL
COMMUNITY
Start: 2018-04-18 | End: 2018-05-10

## 2018-05-10 RX ORDER — GLATIRAMER ACETATE 40 MG/ML
40 INJECTION, SOLUTION SUBCUTANEOUS 3 TIMES WEEKLY
Qty: 12 ML | Refills: 5 | Status: SHIPPED | OUTPATIENT
Start: 2018-05-11 | End: 2018-05-23

## 2018-05-10 RX ORDER — GLATIRAMER ACETATE 40 MG/ML
40 INJECTION, SOLUTION SUBCUTANEOUS 3 TIMES WEEKLY
Qty: 12 ML | Refills: 11 | Status: SHIPPED | OUTPATIENT
Start: 2018-05-11 | End: 2018-05-11 | Stop reason: SDUPTHER

## 2018-05-10 NOTE — PROGRESS NOTES
Patient is here today for a follow up for her multiple sclerosis    Patient ID: Monica Bellamy is a 56 y o  female  Assessment/Plan:     Problem List Items Addressed This Visit        Cardiovascular and Mediastinum    Migraine       Nervous and Auditory    Multiple sclerosis, relapsing-remitting ([facility] ) - Primary    Relevant Medications    COPAXONE 40 MG/ML SOSY (Start on 5/11/2018)    COPAXONE 40 MG/ML SOSY (Start on 5/11/2018)    Other Relevant Orders    MRI brain MS wo and w contrast    MRI cervical spine with and without contrast    CBC and differential    Comprehensive metabolic panel    Ambulatory referral to Physical Therapy       Other    PTSD (post-traumatic stress disorder)    Relevant Medications    COPAXONE 40 MG/ML SOSY (Start on 5/11/2018)    COPAXONE 40 MG/ML SOSY (Start on 5/11/2018)    Fibromyalgia    Anxiety and depression    Relevant Medications    COPAXONE 40 MG/ML SOSY (Start on 5/11/2018)    COPAXONE 40 MG/ML SOSY (Start on 5/11/2018)    Chronic pain syndrome         Mrs Rafita Walker has presented for follow up on RRMS, she has been off Copaxone 40 mg since 5 months ago  Patient has described multple new focal neurologic deficits, as she had PNA and COPD exacerbation while not taking DMT  MRI Brain and Cervical spine will be scheduled  Blood work will be scheduled  Balance issues has gotten worse - she will start PT/balance therapy  Patient has no weakness or new sensory disturbances  Follow up in 3-6 months  HPI/History of Present Illness  Ms Rafita Walker is a 72-year-old female with a history of multiple sclerosis, fibromyalgia, urinary frequency, migraine, substance abuse, chronic pain syndrome, lumbar spine degenerative changes, who presented to [address] [place] [address] for evaluation of MS and related issues  Mrs Rafita Walker has been incarcerated 11/15/17- 1/23/18 and she was provided Copaxone while in [facility]   Patient feels that she wa provided a wrong dosage of Copaxone at that time and she run off copaxone since her release  Patient described having numbness and tingling in her fingers and her feet  Dizziness and lightheadedness, vision is worse, worse numbness and weakness in upper and lower extremities      September 2018, ophthalmology follow up is scheduled  The following portions of the patient's history were reviewed and updated as appropriate:   She  has a past medical history of Asthma; Asthma with COPD ([facility] [address] ); [facility] ); COPD (chronic obstructive pulmonary disease) ([facility] ); Heart murmur; Laceration of finger; Lipoma; Migraine; Multiple sclerosis ([facility] ); Obstructive sleep apnea; Osteoarthritis; Post traumatic stress disorder; Tenosynovitis of thumb; and Weight gain  She   Patient Active Problem List    Diagnosis Date Noted    Chronic pain syndrome 05/10/2018    Degenerative lumbar spinal stenosis 11/14/2017    Anxiety and depression 11/14/2017    Elevated LDL cholesterol level 09/01/2017    PTSD (post-traumatic stress disorder) 07/31/2017    Chronic pain disorder 07/31/2017    Multiple sclerosis, relapsing-remitting ([facility] ) 03/16/2017    Migraine 03/16/2017    Vitamin D deficiency 12/12/2016    Asthma with COPD ([facility] ) 01/08/2015    Fibromyalgia 05/04/2012     She  has a past surgical history that includes Knee arthroscopy w/ meniscal repair (Right)  Her family history includes Asthma in her brother and father; COPD in her father; Diabetes in her father; Hepatitis in her mother; Hypertension in her father  She  reports that she has quit smoking  She has never used smokeless tobacco  She reports that she does not drink alcohol or use drugs    Current Outpatient Prescriptions   Medication Sig Dispense Refill    albuterol (2 5 mg/3 mL) 0 083 % nebulizer solution TAKE 1 5 MLL (1 25 MG TOTAL)(HALF A VIAL) BY NEBULIZATION EVERY 6 (SIX) HOURS AS NEEDED FOR WHEEZING  0    budesonide-formoterol (SYMBICORT) 160-4 5 mcg/act inhaler Inhale 2 puffs 2 (two) times a day 1 Inhaler 0    oxybutynin (DITROPAN-XL) 5 mg 24 hr tablet       SUMAtriptan (IMITREX) 100 mg tablet Take 1 tablet by mouth      theophylline (VIKA-24) 100 MG 24 hr capsule Take 1 capsule (100 mg total) by mouth daily 14 capsule 0    traZODone (DESYREL) 100 mg tablet 200 mg daily at bedtime  2    VENTOLIN  (90 Base) MCG/ACT inhaler Inhale 1-2 puffs every 4 to 6 hours as needed and as directed  0    [START ON 5/11/2018] COPAXONE 40 MG/ML SOSY Inject 1 mL (40 mg total) under the skin 3 (three) times a week 12 mL 5    [START ON 5/11/2018] COPAXONE 40 MG/ML SOSY Inject 1 mL (40 mg total) under the skin 3 (three) times a week 12 mL 11    Glatiramer Acetate (COPAXONE) 40 MG/ML SOSY Inject under the skin 3 (three) times a week  Current Facility-Administered Medications   Medication Dose Route Frequency Provider Last Rate Last Dose    albuterol (PROVENTIL HFA,VENTOLIN HFA) inhaler 2 puff  2 puff Inhalation Q4H PRN [facility]         Current Outpatient Prescriptions on File Prior to Visit   Medication Sig    albuterol (2 5 mg/3 mL) 0 083 % nebulizer solution TAKE 1 5 MLL (1 25 MG TOTAL)(HALF A VIAL) BY NEBULIZATION EVERY 6 (SIX) HOURS AS NEEDED FOR WHEEZING    budesonide-formoterol (SYMBICORT) 160-4 5 mcg/act inhaler Inhale 2 puffs 2 (two) times a day    oxybutynin (DITROPAN-XL) 5 mg 24 hr tablet     SUMAtriptan (IMITREX) 100 mg tablet Take 1 tablet by mouth    theophylline (VIKA-24) 100 MG 24 hr capsule Take 1 capsule (100 mg total) by mouth daily    traZODone (DESYREL) 100 mg tablet 200 mg daily at bedtime    VENTOLIN  (90 Base) MCG/ACT inhaler Inhale 1-2 puffs every 4 to 6 hours as needed and as directed    Glatiramer Acetate (COPAXONE) 40 MG/ML SOSY Inject under the skin 3 (three) times a week      [DISCONTINUED] albuterol (ACCUNEB) 1 25 MG/3ML nebulizer solution Take 3 mL (1 25 mg total) by nebulization every 6 (six) hours as needed for wheezing Current Facility-Administered Medications on File Prior to Visit   Medication    albuterol (PROVENTIL HFA,VENTOLIN HFA) inhaler 2 puff     She has No Known Allergies            Objective:    Blood pressure 111/62, pulse 66, resp  rate 18, height 5' (1 524 m), weight 85 3 kg (188 lb)  Physical Exam/Neurological Exam  CONSTITUTIONAL: NAD, pleasant  NECK: supple, no lymphadenopathy, no thyromegaly, no JVD  CARDIOVASCULAR: RRR, normal S1S2, no murmurs, no rubs  RESP: clear to auscultation bilaterally, no wheezes/rhonchi/rales  ABDOMEN: soft, non tender, non distended  SKIN: no rash or skin lesions  EXTREMITIES: no edema, pulses 2+bilaterally  PSYCH: appropriate mood and affect  NEUROLOGIC COMPREHENSIVE EXAM: Patient is oriented to person, place and time, NAD; appropriate affect  CN II, III, IV, V, VI, VII,VIII,IX,X,XI-XII intact with EOMI, PERRLA, OKN intact, VF grossly intact, fundi poorly visualized secondary to pupillary constriction; symmetric face noted  Motor: 5/5 UE/LE bilateral symmetric; Sensory: intact to light touch and pinprick bilaterally; normal vibration sensation feet bilaterally; Coordination within normal limits on FTN and MARTIN testing; DTR: 2/4 through, no Babinski, no clonus  Tandem gait is abnormal  Romberg: negative  ROS:  12 points of review of system was reviewed with the patient and was unremarkable with exception: see HPI  Review of Systems   Constitutional: Positive for fatigue  HENT: Negative  Respiratory: Positive for shortness of breath (COPD)  Cardiovascular: Negative  Gastrointestinal: Negative  Endocrine: Negative  Genitourinary: Negative  Musculoskeletal: Positive for back pain  Allergic/Immunologic: Negative  Neurological: Positive for dizziness, light-headedness, numbness (bilateral hands and feet) and headaches  Hematological: Bruises/bleeds easily  Psychiatric/Behavioral: Positive for confusion  The patient is nervous/anxious

## 2018-05-11 DIAGNOSIS — G35 MULTIPLE SCLEROSIS, RELAPSING-REMITTING (HCC): ICD-10-CM

## 2018-05-11 RX ORDER — GLATIRAMER ACETATE 40 MG/ML
40 INJECTION, SOLUTION SUBCUTANEOUS 3 TIMES WEEKLY
Qty: 12 ML | Refills: 11 | Status: SHIPPED | OUTPATIENT
Start: 2018-05-11 | End: 2018-05-23

## 2018-05-21 DIAGNOSIS — G35 MULTIPLE SCLEROSIS (HCC): Primary | ICD-10-CM

## 2018-05-21 NOTE — TELEPHONE ENCOUNTER
Micheal Small from [address] [place] [address] [phone] option 4, states a new PA is needed if patient is to stay on brand name Copaxone  Former PA valid from November 21 2017-November 21, 2018 if patient switching to generic- will need a new script  Has been off Copaxone for 5 months      [employer] [facility] [place] [phone]  ID: [id number]  BIN: [id number]  PCN: [id number]

## 2018-05-21 NOTE — TELEPHONE ENCOUNTER
I spoke to [place] at [employer] for clarification as [employer] task dated 11/21/17 states PA valid for brand copaxone since pharm had a paid claim at that time  Per Darcy Nunez [place] on file is only for generic and valid until 11/21/18  Please advise if you would like pt to switch to generic or keep her on brand (which requires PA)  If switching to generic, start form will need to be sent to [employer] for [facility] & [employer]  Pharmacy will also need new rx for generic

## 2018-05-23 RX ORDER — GLATIRAMER 40 MG/ML
40 INJECTION, SOLUTION SUBCUTANEOUS 3 TIMES WEEKLY
Qty: 12 SYRINGE | Refills: 11 | Status: SHIPPED | OUTPATIENT
Start: 2018-05-23 | End: 2019-06-12 | Stop reason: SDUPTHER

## 2018-05-23 NOTE — TELEPHONE ENCOUNTER
I do not have form in my possession  Please have your MA fax to [facility] as well as central faxing  [facility] also requires a new rx  Rx entered for your authorization

## 2018-06-04 DIAGNOSIS — G47.09 OTHER INSOMNIA: Primary | ICD-10-CM

## 2018-06-04 DIAGNOSIS — J44.9 CHRONIC OBSTRUCTIVE PULMONARY DISEASE, UNSPECIFIED COPD TYPE (HCC): ICD-10-CM

## 2018-06-04 DIAGNOSIS — B34.9 VIRAL SYNDROME: ICD-10-CM

## 2018-06-05 RX ORDER — TRAZODONE HYDROCHLORIDE 100 MG/1
200 TABLET ORAL
Qty: 60 TABLET | Refills: 0 | Status: SHIPPED | OUTPATIENT
Start: 2018-06-05 | End: 2018-07-04 | Stop reason: SDUPTHER

## 2018-06-07 ENCOUNTER — TRANSCRIBE ORDERS (OUTPATIENT)
Dept: RADIOLOGY | Facility: HOSPITAL | Age: 58
End: 2018-06-07

## 2018-06-07 ENCOUNTER — HOSPITAL ENCOUNTER (OUTPATIENT)
Dept: RADIOLOGY | Facility: HOSPITAL | Age: 58
Discharge: HOME | End: 2018-06-07
Payer: COMMERCIAL

## 2018-06-07 DIAGNOSIS — G89.29 CHRONIC PAIN OF RIGHT ANKLE: ICD-10-CM

## 2018-06-07 DIAGNOSIS — M25.579 ANKLE PAIN, UNSPECIFIED CHRONICITY, UNSPECIFIED LATERALITY: Primary | ICD-10-CM

## 2018-06-07 DIAGNOSIS — M25.579 ANKLE PAIN, UNSPECIFIED CHRONICITY, UNSPECIFIED LATERALITY: ICD-10-CM

## 2018-06-07 DIAGNOSIS — M25.571 CHRONIC PAIN OF RIGHT ANKLE: ICD-10-CM

## 2018-06-07 PROCEDURE — 73610 X-RAY EXAM OF ANKLE: CPT

## 2018-06-08 ENCOUNTER — APPOINTMENT (OUTPATIENT)
Dept: LAB | Facility: HOSPITAL | Age: 58
End: 2018-06-08
Attending: PSYCHIATRY & NEUROLOGY
Payer: COMMERCIAL

## 2018-06-08 ENCOUNTER — PATIENT OUTREACH (OUTPATIENT)
Dept: FAMILY MEDICINE CLINIC | Facility: CLINIC | Age: 58
End: 2018-06-08

## 2018-06-08 DIAGNOSIS — G35 MULTIPLE SCLEROSIS, RELAPSING-REMITTING (HCC): ICD-10-CM

## 2018-06-08 LAB
ALBUMIN SERPL BCP-MCNC: 3.4 G/DL (ref 3.5–5)
ALP SERPL-CCNC: 70 U/L (ref 46–116)
ALT SERPL W P-5'-P-CCNC: 27 U/L (ref 12–78)
ANION GAP SERPL CALCULATED.3IONS-SCNC: 5 MMOL/L (ref 4–13)
AST SERPL W P-5'-P-CCNC: 11 U/L (ref 5–45)
BASOPHILS # BLD AUTO: 0.07 THOUSANDS/ΜL (ref 0–0.1)
BASOPHILS NFR BLD AUTO: 1 % (ref 0–1)
BILIRUB SERPL-MCNC: 0.97 MG/DL (ref 0.2–1)
BUN SERPL-MCNC: 11 MG/DL (ref 5–25)
CALCIUM SERPL-MCNC: 9.6 MG/DL (ref 8.3–10.1)
CHLORIDE SERPL-SCNC: 104 MMOL/L (ref 100–108)
CO2 SERPL-SCNC: 27 MMOL/L (ref 21–32)
CREAT SERPL-MCNC: 0.78 MG/DL (ref 0.6–1.3)
EOSINOPHIL # BLD AUTO: 0.31 THOUSAND/ΜL (ref 0–0.61)
EOSINOPHIL NFR BLD AUTO: 4 % (ref 0–6)
ERYTHROCYTE [DISTWIDTH] IN BLOOD BY AUTOMATED COUNT: 12.7 % (ref 11.6–15.1)
GFR SERPL CREATININE-BSD FRML MDRD: 86 ML/MIN/1.73SQ M
GLUCOSE SERPL-MCNC: 82 MG/DL (ref 65–140)
HCT VFR BLD AUTO: 47 % (ref 34.8–46.1)
HGB BLD-MCNC: 14.7 G/DL (ref 11.5–15.4)
IMM GRANULOCYTES # BLD AUTO: 0.02 THOUSAND/UL (ref 0–0.2)
IMM GRANULOCYTES NFR BLD AUTO: 0 % (ref 0–2)
LYMPHOCYTES # BLD AUTO: 1.66 THOUSANDS/ΜL (ref 0.6–4.47)
LYMPHOCYTES NFR BLD AUTO: 24 % (ref 14–44)
MCH RBC QN AUTO: 27.7 PG (ref 26.8–34.3)
MCHC RBC AUTO-ENTMCNC: 31.3 G/DL (ref 31.4–37.4)
MCV RBC AUTO: 89 FL (ref 82–98)
MONOCYTES # BLD AUTO: 0.7 THOUSAND/ΜL (ref 0.17–1.22)
MONOCYTES NFR BLD AUTO: 10 % (ref 4–12)
NEUTROPHILS # BLD AUTO: 4.29 THOUSANDS/ΜL (ref 1.85–7.62)
NEUTS SEG NFR BLD AUTO: 61 % (ref 43–75)
NRBC BLD AUTO-RTO: 0 /100 WBCS
PLATELET # BLD AUTO: 275 THOUSANDS/UL (ref 149–390)
PMV BLD AUTO: 10.8 FL (ref 8.9–12.7)
POTASSIUM SERPL-SCNC: 4 MMOL/L (ref 3.5–5.3)
PROT SERPL-MCNC: 7.5 G/DL (ref 6.4–8.2)
RBC # BLD AUTO: 5.31 MILLION/UL (ref 3.81–5.12)
SODIUM SERPL-SCNC: 136 MMOL/L (ref 136–145)
WBC # BLD AUTO: 7.05 THOUSAND/UL (ref 4.31–10.16)

## 2018-06-08 PROCEDURE — 80053 COMPREHEN METABOLIC PANEL: CPT

## 2018-06-08 PROCEDURE — 85025 COMPLETE CBC W/AUTO DIFF WBC: CPT

## 2018-06-08 PROCEDURE — 36415 COLL VENOUS BLD VENIPUNCTURE: CPT

## 2018-06-08 NOTE — PROGRESS NOTES
Outpatient Care Management Note:  Received message from [facility] practice to call patient as per her request   CM called patient, requesting assistance finding housing  Homeless, living with friends moving house to house along with her 16year old son  States she has tried several shelters and they are "all full"  Suggested [facility] and as per pt, they are "full"  Provided patient phone number for [facility], housing intake services  [phone]  Pt states she will contact them today for assistance

## 2018-06-10 ENCOUNTER — HOSPITAL ENCOUNTER (OUTPATIENT)
Dept: RADIOLOGY | Facility: HOSPITAL | Age: 58
Discharge: HOME | End: 2018-06-10
Attending: PSYCHIATRY & NEUROLOGY
Payer: COMMERCIAL

## 2018-06-10 DIAGNOSIS — G35 MULTIPLE SCLEROSIS, RELAPSING-REMITTING (HCC): ICD-10-CM

## 2018-06-10 PROCEDURE — 72156 MRI NECK SPINE W/O & W/DYE: CPT

## 2018-06-10 PROCEDURE — 70553 MRI BRAIN STEM W/O & W/DYE: CPT

## 2018-06-10 PROCEDURE — A9585 GADOBUTROL INJECTION: HCPCS | Performed by: PSYCHIATRY & NEUROLOGY

## 2018-06-10 RX ADMIN — GADOBUTROL 7 ML: 604.72 INJECTION INTRAVENOUS at 13:20

## 2018-07-03 ENCOUNTER — TELEPHONE (OUTPATIENT)
Dept: NEUROLOGY | Facility: CLINIC | Age: 58
End: 2018-07-03

## 2018-07-04 DIAGNOSIS — G47.09 OTHER INSOMNIA: ICD-10-CM

## 2018-07-05 RX ORDER — TRAZODONE HYDROCHLORIDE 100 MG/1
200 TABLET ORAL
Qty: 60 TABLET | Refills: 0 | Status: SHIPPED | OUTPATIENT
Start: 2018-07-05 | End: 2018-08-05 | Stop reason: SDUPTHER

## 2018-07-24 DIAGNOSIS — R35.0 URINARY FREQUENCY: Primary | ICD-10-CM

## 2018-07-24 RX ORDER — OXYBUTYNIN CHLORIDE 5 MG/1
5 TABLET, EXTENDED RELEASE ORAL DAILY
Qty: 90 TABLET | Refills: 1 | Status: SHIPPED | OUTPATIENT
Start: 2018-07-24 | End: 2018-10-09 | Stop reason: ALTCHOICE

## 2018-08-01 DIAGNOSIS — J44.9 CHRONIC OBSTRUCTIVE PULMONARY DISEASE, UNSPECIFIED COPD TYPE (HCC): Primary | ICD-10-CM

## 2018-08-01 NOTE — TELEPHONE ENCOUNTER
[facility] [address] said she take's 200 mg one daily last prescribed by you 7/6/18  [facility] [address] said she's taking 200 mg once daily last prescribed by you 7/5/18 not sure what's going on with this I don't see any of this in the patients chart

## 2018-08-02 RX ORDER — THEOPHYLLINE ANHYDROUS 200 MG/1
CAPSULE, EXTENDED RELEASE ORAL
Qty: 90 CAPSULE | Refills: 0 | Status: SHIPPED | OUTPATIENT
Start: 2018-08-02 | End: 2019-02-14

## 2018-08-05 DIAGNOSIS — G47.09 OTHER INSOMNIA: ICD-10-CM

## 2018-08-06 ENCOUNTER — OFFICE VISIT (OUTPATIENT)
Dept: FAMILY MEDICINE CLINIC | Facility: CLINIC | Age: 58
End: 2018-08-06
Payer: COMMERCIAL

## 2018-08-06 VITALS
BODY MASS INDEX: 35.38 KG/M2 | DIASTOLIC BLOOD PRESSURE: 90 MMHG | OXYGEN SATURATION: 98 % | SYSTOLIC BLOOD PRESSURE: 130 MMHG | WEIGHT: 187.4 LBS | TEMPERATURE: 98.2 F | RESPIRATION RATE: 16 BRPM | HEIGHT: 61 IN | HEART RATE: 78 BPM

## 2018-08-06 DIAGNOSIS — J44.9 ASTHMA WITH COPD (HCC): Primary | ICD-10-CM

## 2018-08-06 DIAGNOSIS — F31.9 BIPOLAR 1 DISORDER (HCC): ICD-10-CM

## 2018-08-06 DIAGNOSIS — F41.9 ANXIETY: ICD-10-CM

## 2018-08-06 PROCEDURE — 99214 OFFICE O/P EST MOD 30 MIN: CPT | Performed by: NURSE PRACTITIONER

## 2018-08-06 RX ORDER — OLANZAPINE 2.5 MG/1
2.5 TABLET ORAL
Qty: 30 TABLET | Refills: 0 | Status: SHIPPED | OUTPATIENT
Start: 2018-08-06 | End: 2018-08-23 | Stop reason: SDUPTHER

## 2018-08-06 RX ORDER — TRAZODONE HYDROCHLORIDE 100 MG/1
200 TABLET ORAL
Qty: 60 TABLET | Refills: 5 | Status: SHIPPED | OUTPATIENT
Start: 2018-08-06 | End: 2019-03-29 | Stop reason: SDUPTHER

## 2018-08-06 RX ORDER — LORAZEPAM 0.5 MG/1
0.5 TABLET ORAL EVERY 12 HOURS PRN
Qty: 30 TABLET | Refills: 0 | Status: SHIPPED | OUTPATIENT
Start: 2018-08-06 | End: 2018-10-09 | Stop reason: SDUPTHER
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
## 2018-08-13 ENCOUNTER — TELEPHONE (OUTPATIENT)
Dept: FAMILY MEDICINE CLINIC | Facility: CLINIC | Age: 58
End: 2018-08-13

## 2018-08-13 DIAGNOSIS — G43.909 MIGRAINE SYNDROME: Primary | ICD-10-CM

## 2018-08-13 DIAGNOSIS — G43.909 MIGRAINE WITHOUT STATUS MIGRAINOSUS, NOT INTRACTABLE, UNSPECIFIED MIGRAINE TYPE: ICD-10-CM

## 2018-08-13 DIAGNOSIS — G35 MULTIPLE SCLEROSIS (HCC): ICD-10-CM

## 2018-08-13 NOTE — TELEPHONE ENCOUNTER
[facility] NEUROLOGY CALLED ASKING FOR A REFERRAL TO BE ENTERED IN EPIC FOR DX: Jazmyne Claire AND [id number]

## 2018-08-23 ENCOUNTER — OFFICE VISIT (OUTPATIENT)
Dept: FAMILY MEDICINE CLINIC | Facility: CLINIC | Age: 58
End: 2018-08-23
Payer: COMMERCIAL

## 2018-08-23 VITALS
HEART RATE: 87 BPM | BODY MASS INDEX: 35.08 KG/M2 | HEIGHT: 61 IN | TEMPERATURE: 98.2 F | RESPIRATION RATE: 16 BRPM | WEIGHT: 185.8 LBS | DIASTOLIC BLOOD PRESSURE: 70 MMHG | SYSTOLIC BLOOD PRESSURE: 110 MMHG | OXYGEN SATURATION: 95 %

## 2018-08-23 DIAGNOSIS — F31.9 BIPOLAR 1 DISORDER (HCC): ICD-10-CM

## 2018-08-23 PROCEDURE — 3008F BODY MASS INDEX DOCD: CPT | Performed by: NURSE PRACTITIONER

## 2018-08-23 PROCEDURE — 99213 OFFICE O/P EST LOW 20 MIN: CPT | Performed by: NURSE PRACTITIONER

## 2018-08-23 RX ORDER — OLANZAPINE 2.5 MG/1
2.5 TABLET ORAL
Qty: 30 TABLET | Refills: 0 | Status: SHIPPED | OUTPATIENT
Start: 2018-08-23 | End: 2018-09-05 | Stop reason: SDUPTHER

## 2018-08-23 NOTE — PROGRESS NOTES
Assessment/Plan:    No problem-specific Assessment & Plan notes found for this encounter  Diagnoses and all orders for this visit:    Bipolar 1 disorder ([facility] )  -     OLANZapine (ZyPREXA) 2 5 mg tablet; Take 1 tablet (2 5 mg total) by mouth daily at bedtime        Continue Zyprexa at night with trazodone as symptoms are improving patient is resistant to increased dose however will re-evaluate in 1 month  Encouraged patient to return for Pap and to follow through on cologuard  Subjective:   Chief Complaint   Patient presents with    Follow-up     2 week started on a low dose zyprexia at night    HM: Pap,   Patient has the cologaurd at home   Patient refused the Pneumococcal 23 HM updated       Patient ID: Marti Noe is a 56 y o  female  HPI  Pt here to follow up on new diag of bipolar and start on zyprexia at night to trazodone - states she feels that the medication is working - she reprots she is sleeping better; less mood swings; less agitation; less anxiety  sxs improved by 20 %  Reports that son states improvement in her mood  No daytime sleepiness  NO SI/HI  Has not been able to start counseling at [email or web address] - amendable to call [facility]      The following portions of the patient's history were reviewed and updated as appropriate: allergies, past social history, past surgical history and problem list     Review of Systems   Constitutional: Negative  Musculoskeletal: Negative  Neurological: Negative  Psychiatric/Behavioral:        See hpi         Objective:      /70 (BP Location: Left arm, Patient Position: Sitting, Cuff Size: Adult)   Pulse 87   Temp 98 2 °F (36 8 °C) (Oral)   Resp 16   Ht 5' 1 42" (1 56 m)   Wt 84 3 kg (185 lb 12 8 oz)   SpO2 95%   BMI 34 63 kg/m²          Physical Exam   Constitutional: She is oriented to person, place, and time  She appears well-developed and well-nourished  No distress  HENT:   Head: Normocephalic     Pulmonary/Chest: Effort normal  No respiratory distress  Neurological: She is alert and oriented to person, place, and time  No cranial nerve deficit  Skin: Skin is warm and dry  Psychiatric: Her mood appears not anxious  Her speech is not rapid and/or pressured and not tangential  She is hyperactive  She expresses no homicidal and no suicidal ideation

## 2018-09-05 DIAGNOSIS — F31.9 BIPOLAR 1 DISORDER (HCC): ICD-10-CM

## 2018-09-05 RX ORDER — OLANZAPINE 2.5 MG/1
2.5 TABLET ORAL
Qty: 30 TABLET | Refills: 0 | Status: SHIPPED | OUTPATIENT
Start: 2018-09-05 | End: 2018-10-09 | Stop reason: SDUPTHER

## 2018-10-09 ENCOUNTER — OFFICE VISIT (OUTPATIENT)
Dept: FAMILY MEDICINE CLINIC | Facility: CLINIC | Age: 58
End: 2018-10-09
Payer: COMMERCIAL

## 2018-10-09 VITALS
BODY MASS INDEX: 35.87 KG/M2 | OXYGEN SATURATION: 97 % | HEART RATE: 76 BPM | TEMPERATURE: 98.4 F | SYSTOLIC BLOOD PRESSURE: 120 MMHG | DIASTOLIC BLOOD PRESSURE: 80 MMHG | HEIGHT: 61 IN | RESPIRATION RATE: 16 BRPM | WEIGHT: 190 LBS

## 2018-10-09 DIAGNOSIS — G89.29 CHRONIC BILATERAL THORACIC BACK PAIN: ICD-10-CM

## 2018-10-09 DIAGNOSIS — F31.9 BIPOLAR 1 DISORDER (HCC): Primary | ICD-10-CM

## 2018-10-09 DIAGNOSIS — M54.6 CHRONIC BILATERAL THORACIC BACK PAIN: ICD-10-CM

## 2018-10-09 DIAGNOSIS — F41.9 ANXIETY: ICD-10-CM

## 2018-10-09 DIAGNOSIS — Z23 NEED FOR INFLUENZA VACCINATION: ICD-10-CM

## 2018-10-09 PROCEDURE — 3725F SCREEN DEPRESSION PERFORMED: CPT | Performed by: NURSE PRACTITIONER

## 2018-10-09 PROCEDURE — 99214 OFFICE O/P EST MOD 30 MIN: CPT | Performed by: NURSE PRACTITIONER

## 2018-10-09 RX ORDER — OLANZAPINE 2.5 MG/1
2.5 TABLET ORAL
Qty: 90 TABLET | Refills: 0 | Status: SHIPPED | OUTPATIENT
Start: 2018-10-09 | End: 2018-12-19 | Stop reason: SDUPTHER

## 2018-10-09 RX ORDER — LORAZEPAM 0.5 MG/1
0.5 TABLET ORAL DAILY PRN
Qty: 15 TABLET | Refills: 0 | Status: SHIPPED | OUTPATIENT
Start: 2018-10-09 | End: 2020-10-02 | Stop reason: ALTCHOICE

## 2018-10-09 RX ORDER — METHOCARBAMOL 500 MG/1
500 TABLET, FILM COATED ORAL EVERY 8 HOURS PRN
Qty: 30 TABLET | Refills: 0 | Status: SHIPPED | OUTPATIENT
Start: 2018-10-09 | End: 2018-10-11 | Stop reason: SDUPTHER
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
## 2018-10-10 ENCOUNTER — OFFICE VISIT (OUTPATIENT)
Dept: NEUROLOGY | Facility: CLINIC | Age: 58
End: 2018-10-10
Payer: COMMERCIAL

## 2018-10-10 VITALS
DIASTOLIC BLOOD PRESSURE: 78 MMHG | SYSTOLIC BLOOD PRESSURE: 128 MMHG | HEART RATE: 100 BPM | HEIGHT: 61 IN | BODY MASS INDEX: 36.06 KG/M2 | WEIGHT: 191 LBS

## 2018-10-10 DIAGNOSIS — G89.4 CHRONIC PAIN DISORDER: ICD-10-CM

## 2018-10-10 DIAGNOSIS — G43.909 MIGRAINE SYNDROME: ICD-10-CM

## 2018-10-10 DIAGNOSIS — G35 MULTIPLE SCLEROSIS (HCC): ICD-10-CM

## 2018-10-10 DIAGNOSIS — F31.9 BIPOLAR 1 DISORDER (HCC): ICD-10-CM

## 2018-10-10 DIAGNOSIS — F43.10 PTSD (POST-TRAUMATIC STRESS DISORDER): Primary | ICD-10-CM

## 2018-10-10 PROCEDURE — 99213 OFFICE O/P EST LOW 20 MIN: CPT | Performed by: PSYCHIATRY & NEUROLOGY

## 2018-10-10 RX ORDER — METHOCARBAMOL 750 MG/1
750 TABLET, FILM COATED ORAL EVERY 8 HOURS SCHEDULED
Qty: 90 TABLET | Refills: 3 | Status: SHIPPED | OUTPATIENT
Start: 2018-10-10 | End: 2019-02-22 | Stop reason: SDUPTHER

## 2018-10-10 NOTE — PROGRESS NOTES
Patient ID: Lizzeth Chauhan is a 56 y o  female  Assessment/Plan:     Problem List Items Addressed This Visit        Other    PTSD (post-traumatic stress disorder) - Primary    Chronic pain disorder    Bipolar 1 disorder ([facility] [address] )      Other Visit Diagnoses     Multiple sclerosis ([facility] [address] )        Relevant Medications    methocarbamol (ROBAXIN) 750 mg tablet    Other Relevant Orders    TSH, 3rd generation with Free T4 reflex    Migraine syndrome        Relevant Medications    methocarbamol (ROBAXIN) 750 mg tablet         Today I had the pleasure of seeing your patient, Lizzeth Chauhan, in consultation at [address]  Mrs Ravi Juarez has stable clinical findings of her RRMS while taking Glatiramer acetate 40 mg three times a week  Patient experiencing muscle spasms and neuropathic pain in her upper thoracic area  We did not have MRI T-spine recently, but MRI C-spine also showed small new T4 lesion, no contrast enhancement noted though  Despite those findings, patient is to continue Glatiramer acetate 3 times a a week  Robaxin 750 mg tid was sent to her pharmacy for painful mucsle spasms, patient will start tonight  Patient has increased stress in her life and just lost her boyfriend, with more sleep related issues reported  Patient is to continue Trazodone 200 mg HS  Patient gained 50 lb - we will re-initiate regular physical activity once less stress in her life  No new weakness or sensory disturbances, but balance disfunction has been noted  Follow up in 4 months  Subjective: MS and related issues    HPI/History of Present Illness    Ms Ravi Juarez has a history of multiple sclerosis, fibromyalgia, urinary frequency, migraine, substance abuse, chronic pain syndrome, lumbar spine degenerative changes, who presented for follow up on MS and related issues  Patient described paresthesia in her arms and feet, sleep related issues and dizziness with fatigue     Patient has been taking glatiramer acetate and she has no side effects  Patient has no new focal weakness and she has been going through family related stress  Patient just moved to her boyfriend and unfortunately her  last   Patient is not driving and she has been using public transportation now  No new bladder dysfunction, no visual disturbances, no dizziness and dis balance but no vertigo  The following portions of the patient's history were reviewed and updated as appropriate:   She  has a past medical history of Asthma; Asthma with COPD ([facility] ); Closed fracture of fourth lumbar vertebra ([facility] ); COPD (chronic obstructive pulmonary disease) ([facility] ); Heart murmur; Laceration of finger; Lipoma; Migraine; Multiple sclerosis ([facility] ); Obstructive sleep apnea; Osteoarthritis; Post traumatic stress disorder; Tenosynovitis of thumb; and Weight gain  She   Patient Active Problem List    Diagnosis Date Noted    Bipolar 1 disorder ([place] [address] ) 2018    Chronic pain syndrome 05/10/2018    Degenerative lumbar spinal stenosis 2017    Anxiety and depression 2017    Elevated LDL cholesterol level 2017    PTSD (post-traumatic stress disorder) 2017    Chronic pain disorder 2017    Multiple sclerosis, relapsing-remitting ([place] [address] ) 2017    Migraine 2017    Vitamin D deficiency 2016    Asthma with COPD ([facility] ) 2015    Fibromyalgia 2012     She  has a past surgical history that includes Knee arthroscopy w/ meniscal repair (Right)  Her family history includes Asthma in her brother and father; COPD in her father; Diabetes in her father; Hepatitis in her mother; Hypertension in her father  She  reports that she has been smoking  She has never used smokeless tobacco  She reports that she does not drink alcohol or use drugs    Current Outpatient Prescriptions   Medication Sig Dispense Refill    albuterol (2 5 mg/3 mL) 0 083 % nebulizer solution TAKE 1 5 MLL (1 25 MG TOTAL)(HALF A VIAL) BY NEBULIZATION EVERY 6 (SIX) HOURS AS NEEDED FOR WHEEZING  0    Glatiramer Acetate (COPAXONE) 40 MG/ML SOSY Inject 1 mL (40 mg total) under the skin 3 (three) times a week 12 Syringe 11    LORazepam (ATIVAN) 0 5 mg tablet Take 1 tablet (0 5 mg total) by mouth daily as needed for anxiety (sleep) 15 tablet 0    methocarbamol (ROBAXIN) 500 mg tablet Take 1 tablet (500 mg total) by mouth every 8 (eight) hours as needed for muscle spasms (back pain) Do not mix w/ ativan/alcohol 30 tablet 0    OLANZapine (ZyPREXA) 2 5 mg tablet Take 1 tablet (2 5 mg total) by mouth daily at bedtime 90 tablet 0    SUMAtriptan (IMITREX) 100 mg tablet Take 1 tablet by mouth      VIKA-24 200 MG 24 hr capsule TAKE 1 CAPSULE BY MOUTH EVERY DAY 90 capsule 0    traZODone (DESYREL) 100 mg tablet TAKE 2 TABLETS (200 MG TOTAL) BY MOUTH DAILY AT BEDTIME 60 tablet 5    VENTOLIN  (90 Base) MCG/ACT inhaler Inhale 1-2 puffs every 4 (four) hours as needed for wheezing or shortness of breath every 4 to 6 hours as needed and as directed 1 Inhaler 0    methocarbamol (ROBAXIN) 750 mg tablet Take 1 tablet (750 mg total) by mouth every 8 (eight) hours 90 tablet 3     No current facility-administered medications for this visit        Current Outpatient Prescriptions on File Prior to Visit   Medication Sig    albuterol (2 5 mg/3 mL) 0 083 % nebulizer solution TAKE 1 5 MLL (1 25 MG TOTAL)(HALF A VIAL) BY NEBULIZATION EVERY 6 (SIX) HOURS AS NEEDED FOR WHEEZING    Glatiramer Acetate (COPAXONE) 40 MG/ML SOSY Inject 1 mL (40 mg total) under the skin 3 (three) times a week    LORazepam (ATIVAN) 0 5 mg tablet Take 1 tablet (0 5 mg total) by mouth daily as needed for anxiety (sleep)    methocarbamol (ROBAXIN) 500 mg tablet Take 1 tablet (500 mg total) by mouth every 8 (eight) hours as needed for muscle spasms (back pain) Do not mix w/ ativan/alcohol    OLANZapine (ZyPREXA) 2 5 mg tablet Take 1 tablet (2 5 mg total) by mouth daily at bedtime    SUMAtriptan (IMITREX) 100 mg tablet Take 1 tablet by mouth    VIKA-24 200 MG 24 hr capsule TAKE 1 CAPSULE BY MOUTH EVERY DAY    traZODone (DESYREL) 100 mg tablet TAKE 2 TABLETS (200 MG TOTAL) BY MOUTH DAILY AT BEDTIME    VENTOLIN  (90 Base) MCG/ACT inhaler Inhale 1-2 puffs every 4 (four) hours as needed for wheezing or shortness of breath every 4 to 6 hours as needed and as directed     No current facility-administered medications on file prior to visit  She is allergic to no known allergies            Objective:    Blood pressure 128/78, pulse 100, height 5' 1" (1 549 m), weight 86 6 kg (191 lb)  Physical Exam/Neurological Exam  CONSTITUTIONAL: NAD, pleasant  NECK: supple, no lymphadenopathy, no thyromegaly, no JVD  CARDIOVASCULAR: RRR, normal S1S2, no murmurs, no rubs  RESP: clear to auscultation bilaterally, no wheezes/rhonchi/rales  ABDOMEN: soft, non tender, non distended  SKIN: no rash or skin lesions  EXTREMITIES: no edema, pulses 2+bilaterally  PSYCH: appropriate mood and affect  NEUROLOGIC COMPREHENSIVE EXAM: Patient is oriented to person, place and time, NAD; appropriate affect  CN II, III, IV, V, VI, VII,VIII,IX,X,XI-XII intact with EOMI, PERRLA, OKN intact, VF grossly intact, fundi poorly visualized secondary to pupillary constriction; symmetric face noted  Motor: 5/5 UE/LE bilateral symmetric; Sensory: intact to light touch and pinprick bilaterally; normal vibration sensation feet bilaterally; Coordination within normal limits on FTN and MARTIN testing; DTR: 2/4 through, no Babinski, no clonus  Tandem gait is abnormal  Romberg: negative  ROS:  12 points of review of system was reviewed with the patient and was unremarkable with exception: see HPI  Review of Systems   Constitutional: Positive for fatigue  HENT: Negative  Eyes: Negative  Respiratory: Negative  Cardiovascular: Negative  Gastrointestinal: Negative  Endocrine: Negative  Genitourinary: Negative  Musculoskeletal: Negative  Skin: Negative  Allergic/Immunologic: Negative  Neurological: Positive for dizziness and numbness  Hematological: Negative  Psychiatric/Behavioral: Positive for sleep disturbance

## 2018-10-11 ENCOUNTER — OFFICE VISIT (OUTPATIENT)
Dept: PULMONOLOGY | Facility: CLINIC | Age: 58
End: 2018-10-11
Payer: COMMERCIAL

## 2018-10-11 ENCOUNTER — HOSPITAL ENCOUNTER (EMERGENCY)
Facility: HOSPITAL | Age: 58
Discharge: LEFT BEFORE DISCHARGE | End: 2018-10-11
Payer: COMMERCIAL

## 2018-10-11 ENCOUNTER — TELEPHONE (OUTPATIENT)
Dept: NEUROLOGY | Facility: CLINIC | Age: 58
End: 2018-10-11

## 2018-10-11 VITALS
SYSTOLIC BLOOD PRESSURE: 110 MMHG | TEMPERATURE: 97.7 F | DIASTOLIC BLOOD PRESSURE: 68 MMHG | WEIGHT: 186 LBS | OXYGEN SATURATION: 98 % | BODY MASS INDEX: 35.12 KG/M2 | HEIGHT: 61 IN | HEART RATE: 97 BPM | RESPIRATION RATE: 16 BRPM

## 2018-10-11 VITALS
WEIGHT: 190 LBS | TEMPERATURE: 97 F | SYSTOLIC BLOOD PRESSURE: 167 MMHG | OXYGEN SATURATION: 97 % | BODY MASS INDEX: 35.9 KG/M2 | DIASTOLIC BLOOD PRESSURE: 68 MMHG | HEART RATE: 120 BPM | RESPIRATION RATE: 22 BRPM

## 2018-10-11 DIAGNOSIS — F17.200 SMOKING: ICD-10-CM

## 2018-10-11 DIAGNOSIS — Z23 NEED FOR INFLUENZA VACCINATION: ICD-10-CM

## 2018-10-11 DIAGNOSIS — I50.9 CONGESTIVE HEART FAILURE, UNSPECIFIED HF CHRONICITY, UNSPECIFIED HEART FAILURE TYPE (HCC): Primary | ICD-10-CM

## 2018-10-11 DIAGNOSIS — J44.9 ASTHMA WITH COPD (HCC): ICD-10-CM

## 2018-10-11 LAB
ALBUMIN SERPL BCP-MCNC: 3.3 G/DL (ref 3.5–5)
ALP SERPL-CCNC: 65 U/L (ref 46–116)
ALT SERPL W P-5'-P-CCNC: 28 U/L (ref 12–78)
ANION GAP SERPL CALCULATED.3IONS-SCNC: 7 MMOL/L (ref 4–13)
APTT PPP: 30 SECONDS (ref 24–36)
AST SERPL W P-5'-P-CCNC: 17 U/L (ref 5–45)
ATRIAL RATE: 97 BPM
BASOPHILS # BLD AUTO: 0.09 THOUSANDS/ΜL (ref 0–0.1)
BASOPHILS NFR BLD AUTO: 1 % (ref 0–1)
BILIRUB SERPL-MCNC: 0.72 MG/DL (ref 0.2–1)
BUN SERPL-MCNC: 11 MG/DL (ref 5–25)
CALCIUM SERPL-MCNC: 8.9 MG/DL (ref 8.3–10.1)
CHLORIDE SERPL-SCNC: 108 MMOL/L (ref 100–108)
CO2 SERPL-SCNC: 24 MMOL/L (ref 21–32)
CREAT SERPL-MCNC: 0.99 MG/DL (ref 0.6–1.3)
EOSINOPHIL # BLD AUTO: 0.29 THOUSAND/ΜL (ref 0–0.61)
EOSINOPHIL NFR BLD AUTO: 2 % (ref 0–6)
ERYTHROCYTE [DISTWIDTH] IN BLOOD BY AUTOMATED COUNT: 13.2 % (ref 11.6–15.1)
GFR SERPL CREATININE-BSD FRML MDRD: 64 ML/MIN/1.73SQ M
GLUCOSE SERPL-MCNC: 98 MG/DL (ref 65–140)
HCT VFR BLD AUTO: 50.4 % (ref 34.8–46.1)
HGB BLD-MCNC: 16.2 G/DL (ref 11.5–15.4)
IMM GRANULOCYTES # BLD AUTO: 0.06 THOUSAND/UL (ref 0–0.2)
IMM GRANULOCYTES NFR BLD AUTO: 1 % (ref 0–2)
INR PPP: 1.02 (ref 0.86–1.17)
LYMPHOCYTES # BLD AUTO: 2.4 THOUSANDS/ΜL (ref 0.6–4.47)
LYMPHOCYTES NFR BLD AUTO: 19 % (ref 14–44)
MCH RBC QN AUTO: 27.8 PG (ref 26.8–34.3)
MCHC RBC AUTO-ENTMCNC: 32.1 G/DL (ref 31.4–37.4)
MCV RBC AUTO: 87 FL (ref 82–98)
MONOCYTES # BLD AUTO: 1.02 THOUSAND/ΜL (ref 0.17–1.22)
MONOCYTES NFR BLD AUTO: 8 % (ref 4–12)
NEUTROPHILS # BLD AUTO: 9.09 THOUSANDS/ΜL (ref 1.85–7.62)
NEUTS SEG NFR BLD AUTO: 69 % (ref 43–75)
NRBC BLD AUTO-RTO: 0 /100 WBCS
P AXIS: 44 DEGREES
PLATELET # BLD AUTO: 309 THOUSANDS/UL (ref 149–390)
PMV BLD AUTO: 9.9 FL (ref 8.9–12.7)
POTASSIUM SERPL-SCNC: 3.9 MMOL/L (ref 3.5–5.3)
PR INTERVAL: 136 MS
PROT SERPL-MCNC: 6.7 G/DL (ref 6.4–8.2)
PROTHROMBIN TIME: 13.5 SECONDS (ref 11.8–14.2)
QRS AXIS: 4 DEGREES
QRSD INTERVAL: 70 MS
QT INTERVAL: 348 MS
QTC INTERVAL: 441 MS
RBC # BLD AUTO: 5.82 MILLION/UL (ref 3.81–5.12)
SODIUM SERPL-SCNC: 139 MMOL/L (ref 136–145)
T WAVE AXIS: 27 DEGREES
TROPONIN I SERPL-MCNC: <0.02 NG/ML
VENTRICULAR RATE: 97 BPM
WBC # BLD AUTO: 12.95 THOUSAND/UL (ref 4.31–10.16)

## 2018-10-11 PROCEDURE — 85025 COMPLETE CBC W/AUTO DIFF WBC: CPT

## 2018-10-11 PROCEDURE — 93005 ELECTROCARDIOGRAM TRACING: CPT

## 2018-10-11 PROCEDURE — 84484 ASSAY OF TROPONIN QUANT: CPT

## 2018-10-11 PROCEDURE — 93010 ELECTROCARDIOGRAM REPORT: CPT | Performed by: INTERNAL MEDICINE

## 2018-10-11 PROCEDURE — 90682 RIV4 VACC RECOMBINANT DNA IM: CPT | Performed by: INTERNAL MEDICINE

## 2018-10-11 PROCEDURE — 85730 THROMBOPLASTIN TIME PARTIAL: CPT

## 2018-10-11 PROCEDURE — 80053 COMPREHEN METABOLIC PANEL: CPT

## 2018-10-11 PROCEDURE — 99244 OFF/OP CNSLTJ NEW/EST MOD 40: CPT | Performed by: INTERNAL MEDICINE

## 2018-10-11 PROCEDURE — 90471 IMMUNIZATION ADMIN: CPT | Performed by: INTERNAL MEDICINE

## 2018-10-11 PROCEDURE — 36415 COLL VENOUS BLD VENIPUNCTURE: CPT

## 2018-10-11 PROCEDURE — 85610 PROTHROMBIN TIME: CPT

## 2018-10-11 NOTE — PROGRESS NOTES
Pulmonary outpatient note   Paolo Roe 56 y o  female MRN: [id number]  10/11/2018      Assessment and plan:  Paolo Roe has the following medical problems:  1  Shortness of breath  Most probably multifactorial with COPD, congestive heart failure, obesity and deconditioning  See below  2   COPD  Suspected per history  She had 1 ED admission in the last year  Her symptoms are mild  She is COPD gold stage a or B  I prescribed includes, gave her a sample, short her to the clinic and reviewed it which is very good  Also referred for pulmonary function tests to grade the severity of for airflow obstruction  3   History of heavy smoking  She is a high risk for lung cancer due to 50 pack years of smoking  I ordered chest CT to evaluate her lung parenchyma for her longstanding smoking history and COPD and will also evaluate for lung nodules/lesions  4   Suspected congestive heart failure  Patient has symptoms of orthopnea the and leg swelling  Was never evaluated for congestive heart failure  Given her symptoms I ordered echocardiography for to screen for congestive heart failure  5   Obstructive sleep apnea  Was diagnosed previously as reported by the patient, but she cannot use CPAP due to claustrophobia and PTSD  Follow-up in 6-8 weeks with PFTs, chest CT, clinical response to Spiriva and echo  Ne Arroyo MD/PhD,  [facility]      Return in about 2 months (around 12/11/2018)  History of Present Illness   This is a 56-year-old female with previous medical history of multiple sclerosis, restless leg syndrome and history of heavy smoking with 50 pack years stopped 20 years ago  She was told in the past she has COPD and she is taking albuterol as needed and theophylline  She is complaining of shortness of Breath in very mild exercise, no reports of cough or phlegm     She was diagnosed previously with obstructive sleep apnea according to her but could not use CPAP due to claustrophobia and PTSD  She reports leg swelling in the end of the day and sleeping with 2 pillows  When she is trying to sleep flat she is having dyspnea  She was never diagnosed with heart disease previously  Review of Systems   Constitutional: Negative  HENT: Negative  Eyes: Negative  Respiratory: Positive for shortness of breath  Cardiovascular: Negative  Gastrointestinal: Negative  Endocrine: Negative  Genitourinary: Negative  Musculoskeletal: Positive for arthralgias and back pain  Skin: Negative  Allergic/Immunologic: Negative  Neurological: Positive for numbness  Hematological: Negative  Psychiatric/Behavioral: Negative          Historical Information   Past Medical History:   Diagnosis Date    Asthma     [facility] )     last assessed-9/1/2017    Closed fracture of fourth lumbar vertebra ([facility] )     unspecified fracture morphology, initial onlegvtdc-vxbzlshz-6/31/2017    COPD (chronic obstructive pulmonary disease) (HCC)     Heart murmur     last assessed-5/4/2012    Laceration of finger     last assessed-2/26/2014    Lipoma     last assessed-5/3/2013    Migraine     last assessed-1/16/2013    Multiple sclerosis (HCC)     last assessed-11/14/20176206-[id number]    Obstructive sleep apnea     last assessed-9/23/2013    Osteoarthritis     last assessed-5/4/2012    Post traumatic stress disorder     last assessed-12/13/2013    Tenosynovitis of thumb     left thumb-septic-last assessed-3/26/2014    Weight gain     last assessed-10/17/2013-50lb in about a year with frequent steroids     Past Surgical History:   Procedure Laterality Date    KNEE ARTHROSCOPY W/ MENISCAL REPAIR Right     description: 4/2012-KNEE ARTHROSCOPY W/ MEDIAL MENISCAL REPAIR      Family History   Problem Relation Age of Onset    Hepatitis Mother         C    Asthma Father     COPD Father     Diabetes Father     Hypertension Father    Charmaine Shaw Asthma Brother        Meds/Allergies     Current Outpatient Prescriptions:     albuterol (2 5 mg/3 mL) 0 083 % nebulizer solution, TAKE 1 5 MLL (1 25 MG TOTAL)(HALF A VIAL) BY NEBULIZATION EVERY 6 (SIX) HOURS AS NEEDED FOR WHEEZING, Disp: , Rfl: 0    Glatiramer Acetate (COPAXONE) 40 MG/ML SOSY, Inject 1 mL (40 mg total) under the skin 3 (three) times a week, Disp: 12 Syringe, Rfl: 11    LORazepam (ATIVAN) 0 5 mg tablet, Take 1 tablet (0 5 mg total) by mouth daily as needed for anxiety (sleep), Disp: 15 tablet, Rfl: 0    methocarbamol (ROBAXIN) 750 mg tablet, Take 1 tablet (750 mg total) by mouth every 8 (eight) hours, Disp: 90 tablet, Rfl: 3    OLANZapine (ZyPREXA) 2 5 mg tablet, Take 1 tablet (2 5 mg total) by mouth daily at bedtime, Disp: 90 tablet, Rfl: 0    SUMAtriptan (IMITREX) 100 mg tablet, Take 1 tablet by mouth, Disp: , Rfl:     VIKA-24 200 MG 24 hr capsule, TAKE 1 CAPSULE BY MOUTH EVERY DAY, Disp: 90 capsule, Rfl: 0    traZODone (DESYREL) 100 mg tablet, TAKE 2 TABLETS (200 MG TOTAL) BY MOUTH DAILY AT BEDTIME, Disp: 60 tablet, Rfl: 5    VENTOLIN  (90 Base) MCG/ACT inhaler, Inhale 1-2 puffs every 4 (four) hours as needed for wheezing or shortness of breath every 4 to 6 hours as needed and as directed, Disp: 1 Inhaler, Rfl: 0    umeclidinium bromide (INCRUSE ELLIPTA) 62 5 mcg/inh AEPB inhaler, Inhale 1 puff daily, Disp: 1 Inhaler, Rfl: 5  Allergies   Allergen Reactions    No Known Allergies        Vitals: Blood pressure 110/68, pulse 97, temperature 97 7 °F (36 5 °C), temperature source Tympanic, resp  rate 16, height 5' 1" (1 549 m), weight 84 4 kg (186 lb), SpO2 98 %  Body mass index is 35 14 kg/m²  Oxygen Therapy  SpO2: 98 %  Oxygen Therapy: None (Room air)    Physical Exam  Physical Exam   Constitutional: She is oriented to person, place, and time  She appears well-developed and well-nourished  HENT:   Head: Normocephalic  Eyes: Pupils are equal, round, and reactive to light     Neck: Normal range of motion  Cardiovascular: Normal rate, regular rhythm and normal heart sounds  Exam reveals no friction rub  No murmur heard  Pulmonary/Chest: Effort normal and breath sounds normal  No respiratory distress  She has no wheezes  She has no rales  Abdominal: Soft  Musculoskeletal: Normal range of motion  She exhibits no edema  Neurological: She is alert and oriented to person, place, and time  Skin: Skin is warm  Psychiatric: She has a normal mood and affect  Labs: I have personally reviewed pertinent lab results  Lab Results   Component Value Date    WBC 7 05 06/08/2018    HGB 14 7 06/08/2018    HCT 47 0 (H) 06/08/2018    MCV 89 06/08/2018     06/08/2018     Lab Results   Component Value Date    GLUCOSE 113 02/23/2014    CALCIUM 9 6 06/08/2018     06/08/2018    K 4 0 06/08/2018    CO2 27 06/08/2018     06/08/2018    BUN 11 06/08/2018    CREATININE 0 78 06/08/2018     No results found for: IGE  Lab Results   Component Value Date    ALT 27 06/08/2018    AST 11 06/08/2018    ALKPHOS 70 06/08/2018    BILITOT 0 38 02/13/2014       Imaging and other studies:   I have personally reviewed pertinent imaging studies in PACS  Normal chest x-ray from February 2018  Pulmonary function testing:   No PFTs available in the system      Valentino Marie MD/PhD,  [facility]

## 2018-10-11 NOTE — TELEPHONE ENCOUNTER
Patient has MS with stable findings noted on her recent imaging while she is using glatiramer acetate  She is under significant stress at this point and she can developed hypertensive emergency vs TIA vs actual CVA  Please ask her starting taking aspirin 81 mg daily for 3 months only for presumable TIA  If she has any other new neurologic complaints- please recommend Urgent care or ER evaluation next time

## 2018-10-11 NOTE — ED NOTES
Pt reports "this might be stress my fiance passed this weekend"     Kalani Villela, MARSHALL  10/11/18 [id number]

## 2018-10-11 NOTE — TELEPHONE ENCOUNTER
Patient states she was sitting on the couch last night and noticed her lips and tongue became "numb"  Patient denies any SOB, denies rash  Denies any other sxs  Lasted one hour  Only one occurrence (has never occurred before)  Denies new meds  Please advise

## 2018-11-07 ENCOUNTER — HOSPITAL ENCOUNTER (OUTPATIENT)
Dept: NON INVASIVE DIAGNOSTICS | Facility: HOSPITAL | Age: 58
Discharge: HOME | End: 2018-11-07
Attending: INTERNAL MEDICINE
Payer: COMMERCIAL

## 2018-11-07 ENCOUNTER — TRANSCRIBE ORDERS (OUTPATIENT)
Dept: RADIOLOGY | Facility: HOSPITAL | Age: 58
End: 2018-11-07

## 2018-11-07 ENCOUNTER — APPOINTMENT (OUTPATIENT)
Dept: RADIOLOGY | Facility: HOSPITAL | Age: 58
End: 2018-11-07
Attending: INTERNAL MEDICINE
Payer: COMMERCIAL

## 2018-11-07 ENCOUNTER — HOSPITAL ENCOUNTER (OUTPATIENT)
Dept: RADIOLOGY | Facility: HOSPITAL | Age: 58
Discharge: HOME | End: 2018-11-07
Attending: INTERNAL MEDICINE
Payer: COMMERCIAL

## 2018-11-07 DIAGNOSIS — I50.9 CONGESTIVE HEART FAILURE, UNSPECIFIED HF CHRONICITY, UNSPECIFIED HEART FAILURE TYPE (HCC): ICD-10-CM

## 2018-11-07 DIAGNOSIS — J44.9 ASTHMA WITH COPD (HCC): ICD-10-CM

## 2018-11-07 PROCEDURE — 93306 TTE W/DOPPLER COMPLETE: CPT

## 2018-11-07 PROCEDURE — 93306 TTE W/DOPPLER COMPLETE: CPT | Performed by: INTERNAL MEDICINE

## 2018-11-07 PROCEDURE — 71250 CT THORAX DX C-: CPT

## 2018-11-12 ENCOUNTER — HOSPITAL ENCOUNTER (OUTPATIENT)
Dept: PULMONOLOGY | Facility: HOSPITAL | Age: 58
Discharge: HOME | End: 2018-11-12
Attending: INTERNAL MEDICINE
Payer: COMMERCIAL

## 2018-11-12 DIAGNOSIS — J44.9 ASTHMA WITH COPD (HCC): ICD-10-CM

## 2018-11-12 PROCEDURE — 94060 EVALUATION OF WHEEZING: CPT | Performed by: INTERNAL MEDICINE

## 2018-11-12 PROCEDURE — 94060 EVALUATION OF WHEEZING: CPT

## 2018-11-12 PROCEDURE — 94726 PLETHYSMOGRAPHY LUNG VOLUMES: CPT | Performed by: INTERNAL MEDICINE

## 2018-11-12 PROCEDURE — 94729 DIFFUSING CAPACITY: CPT | Performed by: INTERNAL MEDICINE

## 2018-11-12 PROCEDURE — 94726 PLETHYSMOGRAPHY LUNG VOLUMES: CPT

## 2018-11-12 PROCEDURE — 94729 DIFFUSING CAPACITY: CPT

## 2018-11-12 PROCEDURE — 94761 N-INVAS EAR/PLS OXIMETRY MLT: CPT

## 2018-11-12 RX ORDER — ALBUTEROL SULFATE 2.5 MG/3ML
2.5 SOLUTION RESPIRATORY (INHALATION) ONCE
Status: COMPLETED | OUTPATIENT
Start: 2018-11-12 | End: 2018-11-12

## 2018-11-12 RX ORDER — ALBUTEROL SULFATE 2.5 MG/3ML
2.5 SOLUTION RESPIRATORY (INHALATION) ONCE
Status: DISCONTINUED | OUTPATIENT
Start: 2018-11-12 | End: 2018-11-12

## 2018-11-12 RX ADMIN — ALBUTEROL SULFATE 2.5 MG: 2.5 SOLUTION RESPIRATORY (INHALATION) at 13:57

## 2018-12-19 ENCOUNTER — DOCUMENTATION (OUTPATIENT)
Dept: NEUROLOGY | Facility: CLINIC | Age: 58
End: 2018-12-19

## 2018-12-19 DIAGNOSIS — F31.9 BIPOLAR 1 DISORDER (HCC): ICD-10-CM

## 2018-12-19 NOTE — PROGRESS NOTES
Received fax from [facility] requesting form to be completed for Glatiramer PA  Form completed, signed by Dr Siddhartha Porras and faxed  Scanned to CF

## 2018-12-20 RX ORDER — OLANZAPINE 2.5 MG/1
2.5 TABLET ORAL
Qty: 90 TABLET | Refills: 1 | Status: SHIPPED | OUTPATIENT
Start: 2018-12-20 | End: 2020-10-02 | Stop reason: ALTCHOICE

## 2019-02-14 ENCOUNTER — OFFICE VISIT (OUTPATIENT)
Dept: NEUROLOGY | Facility: CLINIC | Age: 59
End: 2019-02-14
Payer: COMMERCIAL

## 2019-02-14 ENCOUNTER — TELEPHONE (OUTPATIENT)
Dept: NEUROLOGY | Facility: CLINIC | Age: 59
End: 2019-02-14

## 2019-02-14 VITALS
BODY MASS INDEX: 37 KG/M2 | SYSTOLIC BLOOD PRESSURE: 118 MMHG | WEIGHT: 196 LBS | DIASTOLIC BLOOD PRESSURE: 78 MMHG | HEIGHT: 61 IN | HEART RATE: 78 BPM

## 2019-02-14 DIAGNOSIS — G35 MULTIPLE SCLEROSIS, RELAPSING-REMITTING (HCC): Primary | ICD-10-CM

## 2019-02-14 DIAGNOSIS — F43.10 PTSD (POST-TRAUMATIC STRESS DISORDER): ICD-10-CM

## 2019-02-14 DIAGNOSIS — M79.2 NEUROPATHIC PAIN: ICD-10-CM

## 2019-02-14 PROCEDURE — 99214 OFFICE O/P EST MOD 30 MIN: CPT | Performed by: PSYCHIATRY & NEUROLOGY

## 2019-02-14 RX ORDER — GABAPENTIN 100 MG/1
CAPSULE ORAL
Qty: 90 CAPSULE | Refills: 5 | Status: SHIPPED | OUTPATIENT
Start: 2019-02-14 | End: 2019-08-21 | Stop reason: SDUPTHER

## 2019-02-14 NOTE — PROGRESS NOTES
Patient ID: Sue Xiao is a 66 y o  female  Assessment/Plan:     Problem List Items Addressed This Visit        Nervous and Auditory    Multiple sclerosis, relapsing-remitting ([facility] [address] ) - Primary    Relevant Medications    gabapentin (NEURONTIN) 100 mg capsule       Other    PTSD (post-traumatic stress disorder)      Other Visit Diagnoses     Neuropathic pain        Relevant Medications    gabapentin (NEURONTIN) 100 mg capsule         Today I had the pleasure of seeing your patient, Sue Xiao , in consultation at [address]  Mrs Thad Aponte has presented for follow up on MS and related issues, with stable clinical presentation aside form worsening burning sensation in her feet for the last 2 months  No lower back pain, no weakness has been reported  Glatiramer acetate 40 mg three times a week provide no significant side effects aside form injection reaction  We agreed to have a trial of gabapentin 100 mg HS ( up to 300 mg HS) for burning in the patient feet bilaterally  Trazodone provides some benefits for insomnia  Patient is concerned for her lung disease with pulmonary team Dr Kaitlin Nair last office visit 11/12/18 - she had completed Pulm PFT with six minute walk, and waiting for a follow up appointment  Patient believes she has worsening pulmonary dysfunction  No new focal neurologic deficit has been described  MRI C/T spine will be done at the end of 2019  Follow up in 6 months  Subjective:MS and related issues      HPI/History of Present Illness  Ms Thad Aponte has a history of multiple sclerosis, fibromyalgia, urinary frequency, migraine, substance abuse, chronic pain syndrome, Bipolar type I, lumbar spine degenerative changes, who presented for follow up on MS and related issues  Patient described no new focal neurologic deficit while she is taking glatiramer acetate 40 mg  Some injection reaction has been described       Congestive heart disease was of concern and she has been following with [facility]'s Pulmonary team till November 2018  Patient afraid she has worsening of her lung dysfunction  Robaxin was provided on as needed bases,Trazodone 200 mg for sleep - patient has 6-8 hours of sleep with 2 awakening during the night  No new weakness     The following portions of the patient's history were reviewed and updated as appropriate:   She  has a past medical history of Asthma, Asthma with COPD ([facility] [address] ), [facility] ), COPD (chronic obstructive pulmonary disease) ([facility] ), Heart murmur, Laceration of finger, Lipoma, Migraine, Multiple sclerosis ([facility] ), Obstructive sleep apnea, Osteoarthritis, Post traumatic stress disorder, Tenosynovitis of thumb, and Weight gain  She   Patient Active Problem List    Diagnosis Date Noted    Bipolar 1 disorder ([facility] ) 08/23/2018    Chronic pain syndrome 05/10/2018    Degenerative lumbar spinal stenosis 11/14/2017    Anxiety and depression 11/14/2017    Elevated LDL cholesterol level 09/01/2017    PTSD (post-traumatic stress disorder) 07/31/2017    Chronic pain disorder 07/31/2017    Multiple sclerosis, relapsing-remitting ([place] [address] ) 03/16/2017    Migraine 03/16/2017    Vitamin D deficiency 12/12/2016    Asthma with COPD ([facility] ) 01/08/2015    Fibromyalgia 05/04/2012     She  has a past surgical history that includes Knee arthroscopy w/ meniscal repair (Right)  Her family history includes Asthma in her brother and father; COPD in her father; Diabetes in her father; Hepatitis in her mother; Hypertension in her father  She  reports that she quit smoking about 21 years ago  Her smoking use included cigarettes  She has a 20 00 pack-year smoking history  She has never used smokeless tobacco  She reports that she does not drink alcohol or use drugs    Current Outpatient Medications   Medication Sig Dispense Refill    albuterol (2 5 mg/3 mL) 0 083 % nebulizer solution TAKE 1 5 MLL (1 25 MG TOTAL)(HALF A VIAL) BY NEBULIZATION EVERY 6 (SIX) HOURS AS NEEDED FOR WHEEZING  0    Glatiramer Acetate (COPAXONE) 40 MG/ML SOSY Inject 1 mL (40 mg total) under the skin 3 (three) times a week 12 Syringe 11    LORazepam (ATIVAN) 0 5 mg tablet Take 1 tablet (0 5 mg total) by mouth daily as needed for anxiety (sleep) 15 tablet 0    methocarbamol (ROBAXIN) 750 mg tablet Take 1 tablet (750 mg total) by mouth every 8 (eight) hours 90 tablet 3    OLANZapine (ZyPREXA) 2 5 mg tablet Take 1 tablet (2 5 mg total) by mouth daily at bedtime 90 tablet 1    SUMAtriptan (IMITREX) 100 mg tablet Take 1 tablet by mouth      traZODone (DESYREL) 100 mg tablet TAKE 2 TABLETS (200 MG TOTAL) BY MOUTH DAILY AT BEDTIME 60 tablet 5    umeclidinium bromide (INCRUSE ELLIPTA) 62 5 mcg/inh AEPB inhaler Inhale 1 puff daily 1 Inhaler 5    VENTOLIN  (90 Base) MCG/ACT inhaler Inhale 1-2 puffs every 4 (four) hours as needed for wheezing or shortness of breath every 4 to 6 hours as needed and as directed 1 Inhaler 0    gabapentin (NEURONTIN) 100 mg capsule Take 1 cap at night time, can increase dose up to 3 caps at night 90 capsule 5     No current facility-administered medications for this visit        Current Outpatient Medications on File Prior to Visit   Medication Sig    albuterol (2 5 mg/3 mL) 0 083 % nebulizer solution TAKE 1 5 MLL (1 25 MG TOTAL)(HALF A VIAL) BY NEBULIZATION EVERY 6 (SIX) HOURS AS NEEDED FOR WHEEZING    Glatiramer Acetate (COPAXONE) 40 MG/ML SOSY Inject 1 mL (40 mg total) under the skin 3 (three) times a week    LORazepam (ATIVAN) 0 5 mg tablet Take 1 tablet (0 5 mg total) by mouth daily as needed for anxiety (sleep)    methocarbamol (ROBAXIN) 750 mg tablet Take 1 tablet (750 mg total) by mouth every 8 (eight) hours    OLANZapine (ZyPREXA) 2 5 mg tablet Take 1 tablet (2 5 mg total) by mouth daily at bedtime    SUMAtriptan (IMITREX) 100 mg tablet Take 1 tablet by mouth    traZODone (DESYREL) 100 mg tablet TAKE 2 TABLETS (200 MG TOTAL) BY MOUTH DAILY AT BEDTIME    umeclidinium bromide (INCRUSE ELLIPTA) 62 5 mcg/inh AEPB inhaler Inhale 1 puff daily    VENTOLIN  (90 Base) MCG/ACT inhaler Inhale 1-2 puffs every 4 (four) hours as needed for wheezing or shortness of breath every 4 to 6 hours as needed and as directed    [DISCONTINUED] VIKA-24 200 MG 24 hr capsule TAKE 1 CAPSULE BY MOUTH EVERY DAY     No current facility-administered medications on file prior to visit  She is allergic to no known allergies            Objective:    Blood pressure 118/78, pulse 78, height 5' 1" (1 549 m), weight 88 9 kg (196 lb)  Physical Exam/Neurological Exam  CONSTITUTIONAL: NAD, pleasant  NECK: supple, no lymphadenopathy, no thyromegaly, no JVD  CARDIOVASCULAR: RRR, normal S1S2, no murmurs, no rubs  RESP: clear to auscultation bilaterally, no wheezes/rhonchi/rales  ABDOMEN: soft, non tender, non distended  SKIN: no rash or skin lesions  EXTREMITIES: no edema, pulses 2+bilaterally  PSYCH: appropriate mood and affect  NEUROLOGIC COMPREHENSIVE EXAM: Patient is oriented to person, place and time, NAD; appropriate affect  CN II, III, IV, V, VI, VII,VIII,IX,X,XI-XII intact with EOMI, PERRLA, OKN intact, VF grossly intact, fundi poorly visualized secondary to pupillary constriction; symmetric face noted  Motor: 5/5 UE/LE bilateral symmetric; Sensory: intact to light touch and pinprick bilaterally; normal vibration sensation feet bilaterally; Coordination within normal limits on FTN and MARTIN testing; DTR: 2/4 through, no Babinski, no clonus  Tandem gait is abnormal  Romberg: negative  ROS:  12 points of review of system was reviewed with the patient and was unremarkable with exception: see HPI  Review of Systems   Constitutional: Positive for unexpected weight change  HENT: Negative  Eyes: Negative  Respiratory: Positive for shortness of breath  Cardiovascular: Negative  Gastrointestinal: Negative  Endocrine: Negative  Genitourinary: Negative  Musculoskeletal: Positive for gait problem  Extreme foot pain   Skin: Negative  Allergic/Immunologic: Negative  Neurological:        Tingling in toes   Hematological: Negative  Psychiatric/Behavioral: Negative

## 2019-02-15 NOTE — TELEPHONE ENCOUNTER
Patient stated she had 2 NSH with her pulmonary team Dr Jasmin Arceo due to worsening forgetfulness  Patient has MS related cognitive dysfunction and underlying behavioral disorder related memory issues  Patient stated she was discharged from pulmonary practice, but she did not establish her care with any other pulmonary specialist  Patient asking Neurology team to help re-establishing her care due to concern for her COPD and related lung problem

## 2019-02-18 NOTE — TELEPHONE ENCOUNTER
Called [facility] Dr Zen Luo office [phone]  Per last office visit with them on 10/11/2018, patient was supposed to follow up in 2 months  Patient was discharged from their practice on Feb 9 due to noncompliance  Made Dr Federico Yates aware  Patient to follow up with her PCP    Made patient aware, she verbalizes understanding

## 2019-02-22 DIAGNOSIS — G35 MULTIPLE SCLEROSIS (HCC): ICD-10-CM

## 2019-02-23 RX ORDER — METHOCARBAMOL 750 MG/1
750 TABLET, FILM COATED ORAL EVERY 8 HOURS SCHEDULED
Qty: 90 TABLET | Refills: 3 | Status: SHIPPED | OUTPATIENT
Start: 2019-02-23 | End: 2020-10-02 | Stop reason: ALTCHOICE

## 2019-03-29 DIAGNOSIS — G47.09 OTHER INSOMNIA: ICD-10-CM

## 2019-03-29 NOTE — TELEPHONE ENCOUNTER
Medication   traZODone (DESYREL) 100 mg tablet [8083]   traZODone (DESYREL) 100 mg tablet [[id number]]     Order Details   Dose: 200 mg Route: Oral Frequency: Daily at bedtime   Dispense Quantity: 60 tablet Refills: 5 Fills remaining: --           Sig: TAKE 2 TABLETS (200 MG TOTAL) BY MOUTH DAILY AT BEDTIME        [facility]/pharmacy #[id number]  [place]  [address] @[facility] [place]

## 2019-04-01 RX ORDER — TRAZODONE HYDROCHLORIDE 100 MG/1
200 TABLET ORAL
Qty: 30 TABLET | Refills: 0 | Status: SHIPPED | OUTPATIENT
Start: 2019-04-01 | End: 2020-10-02

## 2019-04-12 ENCOUNTER — TELEPHONE (OUTPATIENT)
Dept: NEUROLOGY | Facility: CLINIC | Age: 59
End: 2019-04-12

## 2019-05-18 ENCOUNTER — HOSPITAL ENCOUNTER (EMERGENCY)
Facility: HOSPITAL | Age: 59
Discharge: HOME | End: 2019-05-18
Attending: EMERGENCY MEDICINE | Admitting: EMERGENCY MEDICINE
Payer: COMMERCIAL

## 2019-05-18 VITALS
SYSTOLIC BLOOD PRESSURE: 143 MMHG | TEMPERATURE: 98.8 F | RESPIRATION RATE: 21 BRPM | DIASTOLIC BLOOD PRESSURE: 95 MMHG | OXYGEN SATURATION: 98 % | BODY MASS INDEX: 34.01 KG/M2 | WEIGHT: 180 LBS | HEART RATE: 57 BPM

## 2019-05-18 DIAGNOSIS — T23.269A: Primary | ICD-10-CM

## 2019-05-18 PROCEDURE — 99284 EMERGENCY DEPT VISIT MOD MDM: CPT | Performed by: EMERGENCY MEDICINE

## 2019-05-18 PROCEDURE — 99283 EMERGENCY DEPT VISIT LOW MDM: CPT

## 2019-05-18 RX ORDER — OXYCODONE HYDROCHLORIDE 5 MG/1
5 TABLET ORAL ONCE
Status: COMPLETED | OUTPATIENT
Start: 2019-05-18 | End: 2019-05-18

## 2019-05-18 RX ORDER — LIDOCAINE 40 MG/G
CREAM TOPICAL ONCE
Status: COMPLETED | OUTPATIENT
Start: 2019-05-18 | End: 2019-05-18

## 2019-05-18 RX ORDER — OXYCODONE HYDROCHLORIDE 5 MG/1
5 TABLET ORAL EVERY 8 HOURS PRN
Qty: 6 TABLET | Refills: 0 | Status: SHIPPED | OUTPATIENT
Start: 2019-05-18 | End: 2020-10-02 | Stop reason: ALTCHOICE

## 2019-05-18 RX ADMIN — LIDOCAINE 4%: 4 CREAM TOPICAL at 20:30

## 2019-05-18 RX ADMIN — OXYCODONE HYDROCHLORIDE 5 MG: 5 TABLET ORAL at 20:29

## 2019-06-12 DIAGNOSIS — G35 MULTIPLE SCLEROSIS (HCC): ICD-10-CM

## 2019-06-12 RX ORDER — GLATIRAMER 40 MG/ML
INJECTION, SOLUTION SUBCUTANEOUS
Qty: 12 ML | Refills: 11 | Status: SHIPPED | OUTPATIENT
Start: 2019-06-12 | End: 2020-12-11 | Stop reason: SDUPTHER

## 2019-07-06 ENCOUNTER — APPOINTMENT (EMERGENCY)
Dept: RADIOLOGY | Facility: HOSPITAL | Age: 59
End: 2019-07-06
Payer: COMMERCIAL

## 2019-07-06 ENCOUNTER — HOSPITAL ENCOUNTER (OUTPATIENT)
Facility: HOSPITAL | Age: 59
Setting detail: OBSERVATION
Discharge: HOME HEALTH | End: 2019-07-07
Attending: EMERGENCY MEDICINE | Admitting: INTERNAL MEDICINE
Payer: COMMERCIAL

## 2019-07-06 DIAGNOSIS — S93.401A RIGHT ANKLE SPRAIN: ICD-10-CM

## 2019-07-06 DIAGNOSIS — S86.912A KNEE STRAIN, LEFT, INITIAL ENCOUNTER: ICD-10-CM

## 2019-07-06 DIAGNOSIS — R26.2 AMBULATORY DYSFUNCTION: Primary | ICD-10-CM

## 2019-07-06 DIAGNOSIS — S72.413A FEMORAL CONDYLE FRACTURE (HCC): ICD-10-CM

## 2019-07-06 PROCEDURE — 99220 PR INITIAL OBSERVATION CARE/DAY 70 MINUTES: CPT | Performed by: INTERNAL MEDICINE

## 2019-07-06 PROCEDURE — 99284 EMERGENCY DEPT VISIT MOD MDM: CPT

## 2019-07-06 PROCEDURE — 96374 THER/PROPH/DIAG INJ IV PUSH: CPT

## 2019-07-06 PROCEDURE — 72100 X-RAY EXAM L-S SPINE 2/3 VWS: CPT

## 2019-07-06 PROCEDURE — 72170 X-RAY EXAM OF PELVIS: CPT

## 2019-07-06 PROCEDURE — 73564 X-RAY EXAM KNEE 4 OR MORE: CPT

## 2019-07-06 PROCEDURE — 99284 EMERGENCY DEPT VISIT MOD MDM: CPT | Performed by: EMERGENCY MEDICINE

## 2019-07-06 PROCEDURE — 73590 X-RAY EXAM OF LOWER LEG: CPT

## 2019-07-06 PROCEDURE — 73552 X-RAY EXAM OF FEMUR 2/>: CPT

## 2019-07-06 PROCEDURE — 73610 X-RAY EXAM OF ANKLE: CPT

## 2019-07-06 RX ORDER — KETOROLAC TROMETHAMINE 30 MG/ML
15 INJECTION, SOLUTION INTRAMUSCULAR; INTRAVENOUS ONCE
Status: COMPLETED | OUTPATIENT
Start: 2019-07-06 | End: 2019-07-06

## 2019-07-06 RX ORDER — METHOCARBAMOL 750 MG/1
750 TABLET, FILM COATED ORAL EVERY 8 HOURS SCHEDULED
Status: DISCONTINUED | OUTPATIENT
Start: 2019-07-06 | End: 2019-07-07 | Stop reason: HOSPADM

## 2019-07-06 RX ORDER — ACETAMINOPHEN 325 MG/1
650 TABLET ORAL EVERY 4 HOURS PRN
Status: DISCONTINUED | OUTPATIENT
Start: 2019-07-06 | End: 2019-07-07 | Stop reason: HOSPADM

## 2019-07-06 RX ORDER — NICOTINE 21 MG/24HR
1 PATCH, TRANSDERMAL 24 HOURS TRANSDERMAL DAILY
Status: DISCONTINUED | OUTPATIENT
Start: 2019-07-07 | End: 2019-07-07 | Stop reason: HOSPADM

## 2019-07-06 RX ORDER — OXYCODONE HYDROCHLORIDE 5 MG/1
5 TABLET ORAL EVERY 8 HOURS PRN
Status: DISCONTINUED | OUTPATIENT
Start: 2019-07-06 | End: 2019-07-07 | Stop reason: HOSPADM

## 2019-07-06 RX ORDER — LORAZEPAM 0.5 MG/1
0.5 TABLET ORAL DAILY PRN
Status: DISCONTINUED | OUTPATIENT
Start: 2019-07-06 | End: 2019-07-07 | Stop reason: HOSPADM

## 2019-07-06 RX ORDER — OLANZAPINE 2.5 MG/1
2.5 TABLET ORAL
Status: DISCONTINUED | OUTPATIENT
Start: 2019-07-06 | End: 2019-07-07 | Stop reason: HOSPADM

## 2019-07-06 RX ORDER — GABAPENTIN 100 MG/1
100 CAPSULE ORAL
Status: DISCONTINUED | OUTPATIENT
Start: 2019-07-06 | End: 2019-07-07 | Stop reason: HOSPADM

## 2019-07-06 RX ORDER — ALBUTEROL SULFATE 2.5 MG/3ML
2.5 SOLUTION RESPIRATORY (INHALATION) EVERY 4 HOURS PRN
Status: DISCONTINUED | OUTPATIENT
Start: 2019-07-06 | End: 2019-07-06

## 2019-07-06 RX ORDER — MAGNESIUM HYDROXIDE/ALUMINUM HYDROXICE/SIMETHICONE 120; 1200; 1200 MG/30ML; MG/30ML; MG/30ML
30 SUSPENSION ORAL EVERY 6 HOURS PRN
Status: DISCONTINUED | OUTPATIENT
Start: 2019-07-06 | End: 2019-07-07 | Stop reason: HOSPADM

## 2019-07-06 RX ORDER — ONDANSETRON 2 MG/ML
4 INJECTION INTRAMUSCULAR; INTRAVENOUS EVERY 6 HOURS PRN
Status: DISCONTINUED | OUTPATIENT
Start: 2019-07-06 | End: 2019-07-07 | Stop reason: HOSPADM

## 2019-07-06 RX ORDER — DOCUSATE SODIUM 100 MG/1
100 CAPSULE, LIQUID FILLED ORAL 2 TIMES DAILY PRN
Status: DISCONTINUED | OUTPATIENT
Start: 2019-07-06 | End: 2019-07-07 | Stop reason: HOSPADM

## 2019-07-06 RX ORDER — ACETAMINOPHEN 325 MG/1
975 TABLET ORAL ONCE
Status: COMPLETED | OUTPATIENT
Start: 2019-07-06 | End: 2019-07-06

## 2019-07-06 RX ORDER — TRAZODONE HYDROCHLORIDE 100 MG/1
200 TABLET ORAL
Status: DISCONTINUED | OUTPATIENT
Start: 2019-07-06 | End: 2019-07-07 | Stop reason: HOSPADM

## 2019-07-06 RX ORDER — ALBUTEROL SULFATE 90 UG/1
2 AEROSOL, METERED RESPIRATORY (INHALATION) EVERY 4 HOURS PRN
Status: DISCONTINUED | OUTPATIENT
Start: 2019-07-06 | End: 2019-07-07 | Stop reason: HOSPADM

## 2019-07-06 RX ORDER — KETOROLAC TROMETHAMINE 30 MG/ML
15 INJECTION, SOLUTION INTRAMUSCULAR; INTRAVENOUS EVERY 6 HOURS PRN
Status: DISCONTINUED | OUTPATIENT
Start: 2019-07-06 | End: 2019-07-07 | Stop reason: HOSPADM

## 2019-07-06 RX ORDER — SUMATRIPTAN 50 MG/1
100 TABLET, FILM COATED ORAL DAILY PRN
Status: DISCONTINUED | OUTPATIENT
Start: 2019-07-06 | End: 2019-07-07 | Stop reason: HOSPADM

## 2019-07-06 RX ADMIN — TRAZODONE HYDROCHLORIDE 200 MG: 100 TABLET ORAL at 22:13

## 2019-07-06 RX ADMIN — KETOROLAC TROMETHAMINE 15 MG: 30 INJECTION, SOLUTION INTRAMUSCULAR at 18:42

## 2019-07-06 RX ADMIN — ACETAMINOPHEN 975 MG: 325 TABLET ORAL at 18:37

## 2019-07-06 RX ADMIN — OLANZAPINE 2.5 MG: 2.5 TABLET, FILM COATED ORAL at 22:13

## 2019-07-06 RX ADMIN — METHOCARBAMOL 750 MG: 750 TABLET, FILM COATED ORAL at 22:13

## 2019-07-06 RX ADMIN — GABAPENTIN 100 MG: 100 CAPSULE ORAL at 22:13

## 2019-07-06 NOTE — ED PROVIDER NOTES
History  Chief Complaint   Patient presents with    Ankle Pain     Pt reports she rolled her right ankle coming down the stairs  Pt reports "severe pain" if she trys putting pressure on in  Also c/o left knee pain      HPI    66yo female presents for evaluation of ankle pain after a fall  Patient says she tripped going up two steps, landed on her left knee and rolled her right ankle  Patient says it was very difficult for her to stand up and called 911  Patient complains of right ankle pain and left knee pain  She notes immediate swelling of her right ankle  Denies hitting her head or LOC  Patient is not on any anti-coagulation  Denies any prodromal symptoms  Denies headache, neck pain, chest pain, SOB, nausea, vomiting, abdominal pain, flank pain, back pain, extremity weakness, or numbness/tingling  Prior to Admission Medications   Prescriptions Last Dose Informant Patient Reported? Taking? Glatiramer Acetate 40 MG/ML SOSY   No No   Sig: Inject 1mL (40mg total) under the skin 3 times a week     LORazepam (ATIVAN) 0 5 mg tablet   No No   Sig: Take 1 tablet (0 5 mg total) by mouth daily as needed for anxiety (sleep)   OLANZapine (ZyPREXA) 2 5 mg tablet   No No   Sig: Take 1 tablet (2 5 mg total) by mouth daily at bedtime   SUMAtriptan (IMITREX) 100 mg tablet  Self Yes No   Sig: Take 1 tablet by mouth   VENTOLIN  (90 Base) MCG/ACT inhaler  Self No No   Sig: Inhale 1-2 puffs every 4 (four) hours as needed for wheezing or shortness of breath every 4 to 6 hours as needed and as directed   albuterol (2 5 mg/3 mL) 0 083 % nebulizer solution  Self Yes No   Sig: TAKE 1 5 MLL (1 25 MG TOTAL)(HALF A VIAL) BY NEBULIZATION EVERY 6 (SIX) HOURS AS NEEDED FOR WHEEZING   gabapentin (NEURONTIN) 100 mg capsule   No No   Sig: Take 1 cap at night time, can increase dose up to 3 caps at night   methocarbamol (ROBAXIN) 750 mg tablet   No No   Sig: TAKE 1 TABLET (750 MG TOTAL) BY MOUTH EVERY 8 (EIGHT) HOURS   oxyCODONE (ROXICODONE) 5 mg immediate release tablet   No No   Sig: Take 1 tablet (5 mg total) by mouth every 8 (eight) hours as needed for severe pain for up to 6 dosesMax Daily Amount: 15 mg   traZODone (DESYREL) 100 mg tablet   No No   Sig: Take 2 tablets (200 mg total) by mouth daily at bedtime   umeclidinium bromide (INCRUSE ELLIPTA) 62 5 mcg/inh AEPB inhaler   No No   Sig: Inhale 1 puff daily      Facility-Administered Medications: None       Past Medical History:   Diagnosis Date    Asthma     Asthma with COPD ([facility] )     last assessed-9/1/2017    Closed fracture of fourth lumbar vertebra (H[facility])     unspecified fracture morphology, initial oerhlbxky-otzxhkrh-4/31/2017    COPD [facility]chronic [facility]) [facility]     Heart murmur     last assessed-5/4/2012    Laceration of finger     last assessed-2/26/2014    Lipoma     last assessed-5/3/2013    Migraine     last assessed-1/16/2013    Multiple sclerosis ([facility] )     last assessed-11/14/20174307-[id number]    Obstructive sleep apnea     last assessed-9/23/2013    Osteoarthritis     last assessed-5/4/2012    Post traumatic stress disorder     last assessed-12/13/2013    Tenosynovitis of thumb     left thumb-septic-last assessed-3/26/2014    Weight gain     last assessed-10/17/2013-50lb in about a year with frequent steroids       Past Surgical History:   Procedure Laterality Date    FINGER SURGERY      KNEE ARTHROSCOPY W/ MENISCAL REPAIR Right     description: 4/2012-KNEE ARTHROSCOPY W/ MEDIAL MENISCAL REPAIR        Family History   Problem Relation Age of Onset    Hepatitis Mother         C    Asthma Father     COPD Father     Diabetes Father     Hypertension Father     Asthma Brother      I have reviewed and agree with the history as documented      Social History     Tobacco Use    Smoking status: Current Every Day Smoker     Packs/day: 0 50     Years: 25 00     Pack years: 12 50     Types: Cigarettes    Smokeless tobacco: Never Used Substance Use Topics    Alcohol use: No    Drug use: No        Review of Systems   Constitutional: Negative for chills, diaphoresis, fatigue and fever  HENT: Negative for congestion, rhinorrhea and sore throat  Eyes: Negative for photophobia and visual disturbance  Respiratory: Negative for cough, chest tightness and shortness of breath  Cardiovascular: Negative for chest pain and palpitations  Gastrointestinal: Negative for abdominal pain, blood in stool, constipation, diarrhea, nausea and vomiting  Genitourinary: Negative for dysuria, frequency and hematuria  Musculoskeletal: Positive for arthralgias, gait problem, joint swelling and myalgias  Negative for neck pain and neck stiffness  Skin: Negative for pallor and rash  Neurological: Negative for weakness, light-headedness, numbness and headaches  Hematological: Negative for adenopathy  Does not bruise/bleed easily  All other systems reviewed and are negative  Physical Exam  ED Triage Vitals   Temperature Pulse Respirations Blood Pressure SpO2   07/06/19 1712 07/06/19 1713 07/06/19 1713 07/06/19 1713 07/06/19 1713   98 4 °F (36 9 °C) 87 20 163/66 96 %      Temp Source Heart Rate Source Patient Position - Orthostatic VS BP Location FiO2 (%)   07/06/19 1712 07/06/19 1713 07/06/19 1713 07/06/19 1713 --   Oral Monitor Lying Left arm       Pain Score       07/06/19 1713       Worst Possible Pain             Orthostatic Vital Signs  Vitals:    07/06/19 1730 07/06/19 1830 07/06/19 1900 07/06/19 2100   BP: 121/58 113/56 116/58 114/60   Pulse: 74 70 62 64   Patient Position - Orthostatic VS: Lying  Lying        Physical Exam   Constitutional: She is oriented to person, place, and time  She appears well-developed and well-nourished  No distress  Patient is alert and oriented, appears well and nontoxic, in no acute distress   HENT:   Head: Normocephalic and atraumatic     Mouth/Throat: Oropharynx is clear and moist  No oropharyngeal exudate  Eyes: Pupils are equal, round, and reactive to light  Conjunctivae and EOM are normal    Neck: Normal range of motion  Neck supple  No C-spine tenderness on palpation   Cardiovascular: Normal rate, regular rhythm, normal heart sounds and intact distal pulses  Pulmonary/Chest: Effort normal and breath sounds normal  No respiratory distress  Abdominal: Soft  Bowel sounds are normal  She exhibits no distension  There is no tenderness  There is no guarding  Musculoskeletal: She exhibits edema and tenderness  She exhibits no deformity  +L spine tenderness on palp  RLE: right ankle lateral malleolus tender to palp, edematous with mild ecchymosis, pulses intact   LLE: edema and tenderness of left knee, no crepitus appreciated, tender to palp all throughout leg from hip to shin, flexion and extension of left knee due to pain, no laxity appreciated, pulses intact    Lymphadenopathy:     She has no cervical adenopathy  Neurological: She is alert and oriented to person, place, and time  Skin: Skin is warm and dry  Capillary refill takes less than 2 seconds  No rash noted  She is not diaphoretic  No erythema  No pallor  Psychiatric: She has a normal mood and affect  Her behavior is normal  Judgment and thought content normal    Nursing note and vitals reviewed        ED Medications  Medications   acetaminophen (TYLENOL) tablet 975 mg (975 mg Oral Given 7/6/19 1837)   ketorolac (TORADOL) injection 15 mg (15 mg Intravenous Given 7/6/19 1842)       Diagnostic Studies  Results Reviewed     None                 XR ankle 3+ views RIGHT    (Results Pending)   XR knee 4+ views LEFT    (Results Pending)   XR femur 2 views LEFT    (Results Pending)   XR tibia fibula 2 views LEFT    (Results Pending)   XR lumbar spine 2 or 3 views    (Results Pending)   XR pelvis ap only 1 or 2 vw    (Results Pending)         Procedures  Procedures        ED Course                               MDM  Number of Diagnoses or Management Options  Ambulatory dysfunction:   Knee strain, left, initial encounter:   Right ankle sprain:   Diagnosis management comments: 82-year-old female presents for evaluation of right ankle and left lower extremity pain after a fall  Patient appears well and is hemodynamically stable  We will image right ankle and left lower extremity  Will trial ambulation if all is negative  If unable to ambulate will consider admission for ambulatory dysfunction  Disposition  Final diagnoses:   Right ankle sprain   Knee strain, left, initial encounter   Ambulatory dysfunction     Time reflects when diagnosis was documented in both MDM as applicable and the Disposition within this note     Time User Action Codes Description Comment    7/6/2019  8:26 PM Tarry Deems Add [M10 169G] Right ankle sprain     7/6/2019  8:27 PM Tarry Deems Add [A24 635I] Knee strain, left, initial encounter     7/6/2019  8:27 PM Tarry Deems Add [R26 2] Ambulatory dysfunction     7/6/2019  8:27 PM Tarry Deems Modify [X65 001N] Right ankle sprain     7/6/2019  8:27 PM Tarry Deems Modify [R26 2] Ambulatory dysfunction       ED Disposition     ED Disposition Condition Date/Time Comment    Admit Stable Sat Jul 6, 2019  8:26 PM Case was discussed with Dr Danielle Nuñez and the patient's admission status was agreed to be Admission Status: observation status to the service of Dr Danielle Nuñez   Follow-up Information    None         Current Discharge Medication List      CONTINUE these medications which have NOT CHANGED    Details   albuterol (2 5 mg/3 mL) 0 083 % nebulizer solution TAKE 1 5 MLL (1 25 MG TOTAL)(HALF A VIAL) BY NEBULIZATION EVERY 6 (SIX) HOURS AS NEEDED FOR WHEEZING  Refills: 0      gabapentin (NEURONTIN) 100 mg capsule Take 1 cap at night time, can increase dose up to 3 caps at night  Qty: 90 capsule, Refills: 5    Associated Diagnoses: Multiple sclerosis, relapsing-remitting ([facility] [address] );  Neuropathic pain      Glatiramer Acetate 40 MG/ML SOSY Inject 1mL (40mg total) under the skin 3 times a week  Qty: 12 mL, Refills: 11    Associated Diagnoses: Multiple sclerosis ([facility])      LORazepam (ATIVAN) 0 5 mg tablet Take 1 tablet (0 5 mg total) by mouth daily as needed for anxiety (sleep)  Qty: 15 tablet, Refills: 0    Associated Diagnoses: Anxiety      methocarbamol (ROBAXIN) 750 mg tablet TAKE 1 TABLET (750 MG TOTAL) BY MOUTH EVERY 8 (EIGHT) HOURS  Qty: 90 tablet, Refills: 3    Associated Diagnoses: Multiple sclerosis ([facility])      OLANZapine (ZyPREXA) 2 5 mg tablet Take 1 tablet (2 5 mg total) by mouth daily at bedtime  Qty: 90 tablet, Refills: 1    Associated Diagnoses: Bipolar 1 disorder ([facility])      oxyCODONE (ROXICODONE) 5 mg immediate release tablet Take 1 tablet (5 mg total) by mouth every 8 (eight) hours as needed for severe pain for up to 6 dosesMax Daily Amount: 15 mg  Qty: 6 tablet, Refills: 0    Associated Diagnoses: Second degree burn of back of hand      SUMAtriptan (IMITREX) 100 mg tablet Take 1 tablet by mouth      traZODone (DESYREL) 100 mg tablet Take 2 tablets (200 mg total) by mouth daily at bedtime  Qty: 30 tablet, Refills: 0    Associated Diagnoses: Other insomnia      umeclidinium bromide (INCRUSE ELLIPTA) 62 5 mcg/inh AEPB inhaler Inhale 1 puff daily  Qty: 1 Inhaler, Refills: 5    Associated Diagnoses: Asthma with COPD ([facility])      VENTOLIN  (90 Base) MCG/ACT inhaler Inhale 1-2 puffs every 4 (four) hours as needed for wheezing or shortness of breath every 4 to 6 hours as needed and as directed  Qty: 1 Inhaler, Refills: 0    Associated Diagnoses: Asthma with COPD ([facility] [address] )           No discharge procedures on file  ED Provider  Attending physically available and evaluated Emily Du  ALY managed the patient along with the ED Attending      Electronically Signed by         Tiffani Hanson MD  07/06/19 7917

## 2019-07-06 NOTE — ED ATTENDING ATTESTATION
Violette Farley DO, saw and evaluated the patient  I have discussed the patient with the resident/non-physician practitioner and agree with the resident's/non-physician practitioner's findings, Plan of Care, and MDM as documented in the resident's/non-physician practitioner's note, except where noted  All available labs and Radiology studies were reviewed  I was present for key portions of any procedure(s) performed by the resident/non-physician practitioner and I was immediately available to provide assistance  At this point I agree with the current assessment done in the Emergency Department  I have conducted an independent evaluation of this patient a history and physical is as follows:    66 yof tripped going up steps, landed on left knee and rolled right ankle  /58 (BP Location: Left arm)   Pulse 74   Temp 98 4 °F (36 9 °C) (Oral)   Resp 18   Ht 5' 1" (1 549 m)   Wt 81 6 kg (180 lb)   SpO2 98%   BMI 34 01 kg/m²   NAD, CTA, RRR, c-spine NT, L spine TTP, TTP L hip, femur, knee and tib/fib and r knee    XRs not clearly revealing of knee or ankle injury, but possible femur and L1 fractures  Patient to be admitted as she cannot ambulate at this time             Critical Care Time  Procedures

## 2019-07-07 ENCOUNTER — APPOINTMENT (OUTPATIENT)
Dept: RADIOLOGY | Facility: HOSPITAL | Age: 59
End: 2019-07-07
Payer: COMMERCIAL

## 2019-07-07 VITALS
SYSTOLIC BLOOD PRESSURE: 137 MMHG | HEIGHT: 61 IN | OXYGEN SATURATION: 94 % | HEART RATE: 87 BPM | WEIGHT: 180 LBS | TEMPERATURE: 99.3 F | RESPIRATION RATE: 18 BRPM | BODY MASS INDEX: 33.99 KG/M2 | DIASTOLIC BLOOD PRESSURE: 68 MMHG

## 2019-07-07 PROBLEM — S93.401A RIGHT ANKLE SPRAIN: Status: ACTIVE | Noted: 2019-07-07

## 2019-07-07 LAB
ANION GAP SERPL CALCULATED.3IONS-SCNC: 5 MMOL/L (ref 4–13)
BASOPHILS # BLD AUTO: 0.05 THOUSANDS/ΜL (ref 0–0.1)
BASOPHILS NFR BLD AUTO: 1 % (ref 0–1)
BUN SERPL-MCNC: 17 MG/DL (ref 5–25)
CALCIUM SERPL-MCNC: 8.4 MG/DL (ref 8.3–10.1)
CHLORIDE SERPL-SCNC: 110 MMOL/L (ref 100–108)
CO2 SERPL-SCNC: 27 MMOL/L (ref 21–32)
CREAT SERPL-MCNC: 0.73 MG/DL (ref 0.6–1.3)
EOSINOPHIL # BLD AUTO: 0.35 THOUSAND/ΜL (ref 0–0.61)
EOSINOPHIL NFR BLD AUTO: 4 % (ref 0–6)
ERYTHROCYTE [DISTWIDTH] IN BLOOD BY AUTOMATED COUNT: 13.2 % (ref 11.6–15.1)
GFR SERPL CREATININE-BSD FRML MDRD: 92 ML/MIN/1.73SQ M
GLUCOSE SERPL-MCNC: 102 MG/DL (ref 65–140)
HCT VFR BLD AUTO: 42 % (ref 34.8–46.1)
HGB BLD-MCNC: 13.3 G/DL (ref 11.5–15.4)
IMM GRANULOCYTES # BLD AUTO: 0.01 THOUSAND/UL (ref 0–0.2)
IMM GRANULOCYTES NFR BLD AUTO: 0 % (ref 0–2)
LYMPHOCYTES # BLD AUTO: 2.59 THOUSANDS/ΜL (ref 0.6–4.47)
LYMPHOCYTES NFR BLD AUTO: 33 % (ref 14–44)
MAGNESIUM SERPL-MCNC: 2 MG/DL (ref 1.6–2.6)
MCH RBC QN AUTO: 27.9 PG (ref 26.8–34.3)
MCHC RBC AUTO-ENTMCNC: 31.7 G/DL (ref 31.4–37.4)
MCV RBC AUTO: 88 FL (ref 82–98)
MONOCYTES # BLD AUTO: 0.84 THOUSAND/ΜL (ref 0.17–1.22)
MONOCYTES NFR BLD AUTO: 11 % (ref 4–12)
NEUTROPHILS # BLD AUTO: 4.09 THOUSANDS/ΜL (ref 1.85–7.62)
NEUTS SEG NFR BLD AUTO: 51 % (ref 43–75)
NRBC BLD AUTO-RTO: 0 /100 WBCS
PLATELET # BLD AUTO: 233 THOUSANDS/UL (ref 149–390)
PMV BLD AUTO: 10.3 FL (ref 8.9–12.7)
POTASSIUM SERPL-SCNC: 3.7 MMOL/L (ref 3.5–5.3)
RBC # BLD AUTO: 4.77 MILLION/UL (ref 3.81–5.12)
SODIUM SERPL-SCNC: 142 MMOL/L (ref 136–145)
WBC # BLD AUTO: 7.93 THOUSAND/UL (ref 4.31–10.16)

## 2019-07-07 PROCEDURE — 85025 COMPLETE CBC W/AUTO DIFF WBC: CPT | Performed by: INTERNAL MEDICINE

## 2019-07-07 PROCEDURE — G8987 SELF CARE CURRENT STATUS: HCPCS

## 2019-07-07 PROCEDURE — 73610 X-RAY EXAM OF ANKLE: CPT

## 2019-07-07 PROCEDURE — 99217 PR OBSERVATION CARE DISCHARGE MANAGEMENT: CPT | Performed by: NURSE PRACTITIONER

## 2019-07-07 PROCEDURE — 97167 OT EVAL HIGH COMPLEX 60 MIN: CPT

## 2019-07-07 PROCEDURE — 83735 ASSAY OF MAGNESIUM: CPT | Performed by: INTERNAL MEDICINE

## 2019-07-07 PROCEDURE — 80048 BASIC METABOLIC PNL TOTAL CA: CPT | Performed by: INTERNAL MEDICINE

## 2019-07-07 PROCEDURE — G8988 SELF CARE GOAL STATUS: HCPCS

## 2019-07-07 PROCEDURE — NS001 PR NO SIGNATURE OR ATTESTATION: Performed by: ORTHOPAEDIC SURGERY

## 2019-07-07 RX ADMIN — TIOTROPIUM BROMIDE 18 MCG: 18 CAPSULE ORAL; RESPIRATORY (INHALATION) at 08:20

## 2019-07-07 RX ADMIN — ENOXAPARIN SODIUM 40 MG: 40 INJECTION SUBCUTANEOUS at 08:20

## 2019-07-07 RX ADMIN — METHOCARBAMOL 750 MG: 750 TABLET, FILM COATED ORAL at 13:35

## 2019-07-07 RX ADMIN — METHOCARBAMOL 750 MG: 750 TABLET, FILM COATED ORAL at 05:24

## 2019-07-07 NOTE — RESPIRATORY THERAPY NOTE
RT Protocol Note  Katarina Leonardo 66 y o  female MRN: [id number]  Unit/Bed#: [facility] 895-41 Encounter: [id number]    Assessment    Principal Problem:    Ambulatory dysfunction  Active Problems:    Fibromyalgia    Chronic pain disorder    Anxiety and depression      Home Pulmonary Medications:  incruse ellipta, albuterol mdi, albuterol udn         Past Medical History:   Diagnosis Date    Asthma     Asthma with COPD ([facility] )     last assessed-9/1/2017    Closed fracture of fourth lumbar vertebra ([facility] )     unspecified fracture morphology, initial [id number]-[id number]-5/31/2017    COPD (chronic obstructive pulmonary disease) ([facility]     Heart murmur     last assessed-5/4/2012    Laceration of finger     last assessed-2/26/2014    Lipoma     last assessed-5/3/2013    Migraine     last assessed-1/16/2013    Multiple sclerosis ([facility] )     last assessed-11/14/[id number]    Obstructive sleep apnea     last assessed-9/23/2013    Osteoarthritis     last assessed-5/4/2012    Post traumatic stress disorder     last assessed-12/13/2013    Tenosynovitis of thumb     left thumb-septic-last assessed-3/26/2014    Weight gain     last assessed-10/17/2013-50lb in about a year with frequent steroids     Social History     Socioeconomic History    Marital status:      Spouse name: None    Number of children: None    Years of education: None    Highest education level: None   Occupational History    None   Social Needs    Financial resource strain: None    Food insecurity:     Worry: None     Inability: None    Transportation needs:     Medical: None     Non-medical: None   Tobacco Use    Smoking status: Current Every Day Smoker     Packs/day: 0 50     Years: 25 00     Pack years: 12 50     Types: Cigarettes    Smokeless tobacco: Never Used   Substance and Sexual Activity    Alcohol use: No    Drug use: No    Sexual activity: None   Lifestyle    Physical activity:     Days per week: None     Minutes per session: None    Stress: None   Relationships    Social connections:     Talks on phone: None     Gets together: None     Attends Shinto service: None     Active member of club or organization: None     Attends meetings of clubs or organizations: None     Relationship status: None    Intimate partner violence:     Fear of current or ex partner: None     Emotionally abused: None     Physically abused: None     Forced sexual activity: None   Other Topics Concern    None   Social History Narrative    Daily coffee consumption (1 cups/day)    Disabled    Hindu    No living will    Spouse/family support       Subjective         Objective    Physical Exam:   Assessment Type: Assess only  General Appearance: Alert, Awake  Respiratory Pattern: Normal  Chest Assessment: Chest expansion symmetrical  Bilateral Breath Sounds: Clear    Vitals:  Blood pressure 114/60, pulse 64, temperature 98 4 °F (36 9 °C), temperature source Oral, resp  rate 16, height 5' 1" (1 549 m), weight 81 6 kg (180 lb), SpO2 99 %  Imaging and other studies: I have personally reviewed pertinent reports  Plan    Respiratory Plan: Home Bronchodilator Patient pathway, Discontinue Protocol        Resp Comments: pt assessed at this time for respiratory protocol, pt admitted for ambulatory dysfunction  pt does have history of COPD and Asthma  pt states she uses mdi's daily and prn  and only uses udn when has a chest cold  pt does not appear to have any respiratory complications at this time  will continue with home routine at this time

## 2019-07-07 NOTE — PROGRESS NOTES
Progress Note - Marily Venegas 1960, 66 y o  female MRN: [id number]    Unit/Bed#: [facility] 061-05 Encounter: [id number]    Primary Care Provider: AISHWARYA Chauhan   Date and time admitted to hospital: 7/6/2019  5:10 PM        * Ambulatory dysfunction  Assessment & Plan      Secondary to acute mechanical fall with Suspected fracture of the medial femoral condyle  Discussed with ortho will place consult as pt is tender over entire anterior left knee   Occupational therapy have seen pt at this time will defer until ortho place recommendations I have placed pt on nonweight bearing at this time   Case management consult  Toradol 15 mg intravenous every 6 hours as needed for moderate pain; rest and ice  Patient is on oxycodone 5 mg every 4 hours as needed for severe pain  Right ankle sprain  Assessment & Plan  Pt is sp fall   Consult ortho although imaging stable   Xray right ankle: No acute osseous abnormality  Pt with increased right ankle pain ice and elevate    Chronic pain disorder  Assessment & Plan  Will continue Neurontin  Also has been on oxycodone  Would need to be careful regarding use of pain medications; will continue only her oxycodone p o  Medications while here and as of the moment, would not add any other narcotic meds  Acetaminophen continue, atc    Fibromyalgia  Assessment & Plan  As before with Neurontin  May be contributing to severity of pts pain in left knee and right ankle     Anxiety and depression  Assessment & Plan  Continue trazodone and Zyprexa  VTE Pharmacologic Prophylaxis:   Pharmacologic: Enoxaparin (Lovenox)  Mechanical VTE Prophylaxis in Place: Yes    Patient Centered Rounds: I have performed bedside rounds with nursing staff today  Discussions with Specialists or Other Care Team Provider: nursing     Education and Discussions with Family / Patient: patient     Time Spent for Care: 45 minutes    More than 50% of total time spent on counseling and coordination of care as described above  Current Length of Stay: 0 day(s)    Current Patient Status: Observation   Certification Statement: The patient, admitted on an observation basis, will now require > 2 midnight hospital stay due to pt pending ortho official input    Discharge Plan: needs further eval per ortho     Code Status: Level 3 - DNAR and DNI      Subjective:   Pt is having pain in left knee and more severe it seems in right ankle  Pt states she landed with her full weight on her left knee  Twisting her right ankle  No dizziness no sob no chestpain  Or palpitations fall was mechanical    Objective:     Vitals:   Temp (24hrs), Av 6 °F (37 °C), Min:98 2 °F (36 8 °C), Max:99 °F (37 2 °C)    Temp:  [98 2 °F (36 8 °C)-99 °F (37 2 °C)] 98 2 °F (36 8 °C)  HR:  [62-87] 70  Resp:  [16-20] 18  BP: (100-163)/(56-72) 124/65  SpO2:  [95 %-99 %] 96 %  Body mass index is 34 01 kg/m²  Input and Output Summary (last 24 hours): Intake/Output Summary (Last 24 hours) at 2019 1541  Last data filed at 2019 0915  Gross per 24 hour   Intake 430 ml   Output    Net 430 ml       Physical Exam:     Physical Exam   Constitutional: She is oriented to person, place, and time  She appears well-developed and well-nourished  No distress  HENT:   Head: Normocephalic and atraumatic  Eyes: Conjunctivae and EOM are normal  Right eye exhibits no discharge  Left eye exhibits no discharge  No scleral icterus  Neck: No JVD present  No tracheal deviation present  No thyromegaly present  Cardiovascular: Exam reveals no gallop and no friction rub  No murmur heard  Pulmonary/Chest: Effort normal  No stridor  No respiratory distress  She has no wheezes  She has no rales  She exhibits no tenderness  Abdominal: Soft  She exhibits no distension and no mass  There is no tenderness  There is no rebound and no guarding  No hernia  Musculoskeletal: She exhibits edema (left knee and right ankle edema )   She exhibits no tenderness  Lymphadenopathy:     She has no cervical adenopathy  Neurological: She is oriented to person, place, and time  Skin: No rash noted  She is not diaphoretic  No erythema  No pallor  Psychiatric: She has a normal mood and affect  Additional Data:     Labs:    Results from last 7 days   Lab Units 07/07/19  0438   WBC Thousand/uL 7 93   HEMOGLOBIN g/dL 13 3   HEMATOCRIT % 42 0   PLATELETS Thousands/uL 233   NEUTROS PCT % 51   LYMPHS PCT % 33   MONOS PCT % 11   EOS PCT % 4     Results from last 7 days   Lab Units 07/07/19  0438   SODIUM mmol/L 142   POTASSIUM mmol/L 3 7   CHLORIDE mmol/L 110*   CO2 mmol/L 27   BUN mg/dL 17   CREATININE mg/dL 0 73   ANION GAP mmol/L 5   CALCIUM mg/dL 8 4   GLUCOSE RANDOM mg/dL 102                           * I Have Reviewed All Lab Data Listed Above  * Additional Pertinent Lab Tests Reviewed:  All Labs Within Last 24 Hours Reviewed    Imaging:    Imaging Reports Reviewed Today Include: reviewed     Recent Cultures (last 7 days):           Last 24 Hours Medication List:     Current Facility-Administered Medications:  acetaminophen 650 mg Oral Q4H PRN Nyasia Medellin MD   albuterol 2 puff Inhalation Q4H PRN Nyasia Medellin MD   aluminum-magnesium hydroxide-simethicone 30 mL Oral Q6H PRN Nyasia Medellin MD   docusate sodium 100 mg Oral BID PRN Nyasia Medellin MD   enoxaparin 40 mg Subcutaneous Daily Nyasia Medellin MD   gabapentin 100 mg Oral HS Nyasia Medellin MD   ketorolac 15 mg Intravenous Q6H PRN Nyasia Medellin MD   LORazepam 0 5 mg Oral Daily PRN Nyasia Medellin MD   methocarbamol 750 mg Oral Q8H [address] Nyasia Medellin MD   nicotine 1 patch Transdermal Daily Nyasia Medellin MD   OLANZapine 2 5 mg Oral HS Nyasia Medellin MD   ondansetron 4 mg Intravenous Q6H PRN Nyasia Medellin MD   oxyCODONE 5 mg Oral Q8H PRN Nyasia Medellin MD   SUMAtriptan 100 mg Oral Daily PRN Nyasia Medellin MD   tiotropium 18 mcg Inhalation Daily Nyasia Medellin MD traZODone 200 mg Oral HS Jerzy Reyes MD        Today, Patient Was Seen By: Loree Krabbe, CRNP    ** Please Note: Dictation voice to text software may have been used in the creation of this document   **

## 2019-07-07 NOTE — PLAN OF CARE
Problem: Potential for Falls  Goal: Patient will remain free of falls  Description  INTERVENTIONS:  - Assess patient frequently for physical needs  -  Identify cognitive and physical deficits and behaviors that affect risk of falls    -  [place] fall precautions as indicated by assessment   - Educate patient/family on patient safety including physical limitations  - Instruct patient to call for assistance with activity based on assessment  - Modify environment to reduce risk of injury  - Consider OT/PT consult to assist with strengthening/mobility  Outcome: Adequate for Discharge     Problem: PAIN - ADULT  Goal: Verbalizes/displays adequate comfort level or baseline comfort level  Description  Interventions:  - Encourage patient to monitor pain and request assistance  - Assess pain using appropriate pain scale  - Administer analgesics based on type and severity of pain and evaluate response  - Implement non-pharmacological measures as appropriate and evaluate response  - Consider cultural and social influences on pain and pain management  - Notify physician/advanced practitioner if interventions unsuccessful or patient reports new pain  Outcome: Adequate for Discharge     Problem: INFECTION - ADULT  Goal: Absence or prevention of progression during hospitalization  Description  INTERVENTIONS:  - Assess and monitor for signs and symptoms of infection  - Monitor lab/diagnostic results  - Monitor all insertion sites, i e  indwelling lines, tubes, and drains  - Monitor endotracheal (as able) and nasal secretions for changes in amount and color  - [place] appropriate cooling/warming therapies per order  - Administer medications as ordered  - Instruct and encourage patient and family to use good hand hygiene technique  - Identify and instruct in appropriate isolation precautions for identified infection/condition  Outcome: Adequate for Discharge     Problem: SAFETY ADULT  Goal: Patient will remain free of falls  Description  INTERVENTIONS:  - Assess patient frequently for physical needs  -  Identify cognitive and physical deficits and behaviors that affect risk of falls    -  [place] fall precautions as indicated by assessment   - Educate patient/family on patient safety including physical limitations  - Instruct patient to call for assistance with activity based on assessment  - Modify environment to reduce risk of injury  - Consider OT/PT consult to assist with strengthening/mobility  Outcome: Adequate for Discharge  Goal: Maintain or return to baseline ADL function  Description  INTERVENTIONS:  -  Assess patient's ability to carry out ADLs; assess patient's baseline for ADL function and identify physical deficits which impact ability to perform ADLs (bathing, care of mouth/teeth, toileting, grooming, dressing, etc )  - Assess/evaluate cause of self-care deficits   - Assess range of motion  - Assess patient's mobility; develop plan if impaired  - Assess patient's need for assistive devices and provide as appropriate  - Encourage maximum independence but intervene and supervise when necessary  ¯ Involve family in performance of ADLs  ¯ Assess for home care needs following discharge   ¯ Request OT consult to assist with ADL evaluation and planning for discharge  ¯ Provide patient education as appropriate  Outcome: Adequate for Discharge  Goal: Maintain or return mobility status to optimal level  Description  INTERVENTIONS:  - Assess patient's baseline mobility status (ambulation, transfers, stairs, etc )    - Identify cognitive and physical deficits and behaviors that affect mobility  - Identify mobility aids required to assist with transfers and/or ambulation (gait belt, sit-to-stand, lift, walker, cane, etc )  - [place] fall precautions as indicated by assessment  - Record patient progress and toleration of activity level on Mobility SBAR; progress patient to next Phase/Stage  - Instruct patient to call for assistance with activity based on assessment  - Request Rehabilitation consult to assist with strengthening/weightbearing, etc   Outcome: Adequate for Discharge     Problem: DISCHARGE PLANNING  Goal: Discharge to home or other facility with appropriate resources  Description  INTERVENTIONS:  - Identify barriers to discharge w/patient and caregiver  - Arrange for needed discharge resources and transportation as appropriate  - Identify discharge learning needs (meds, wound care, etc )  - Arrange for interpretive services to assist at discharge as needed  - Refer to Case Management Department for coordinating discharge planning if the patient needs post-hospital services based on physician/advanced practitioner order or complex needs related to functional status, cognitive ability, or social support system  Outcome: Adequate for Discharge

## 2019-07-07 NOTE — PLAN OF CARE
Problem: OCCUPATIONAL THERAPY ADULT  Goal: Performs self-care activities at highest level of function for planned discharge setting  See evaluation for individualized goals  Description  Treatment Interventions: ADL retraining, Functional transfer training, UE strengthening/ROM, Endurance training, Cognitive reorientation, Patient/family training, Equipment evaluation/education, Compensatory technique education, Continued evaluation, Energy conservation, Activityengagement  Equipment Recommended: Tub seat with back, Other (comment)(SPC)       See flowsheet documentation for full assessment, interventions and recommendations  Note:   Limitation: Decreased ADL status, Decreased endurance, Decreased Safe judgement during ADL, Decreased self-care trans, Decreased high-level ADLs  Prognosis: Fair  Assessment: Pt is a 67 y/o female seen for OT eval s/p adm to B after a fall, twisting her right ankle and landing on left knee  X-rays negative for any fractures  At time of evaluation pt had no weight bearing restrictions and pending discharge  After evaluation, pt had continued tenderness in L knee and w/ further imaging, pt may have a suspect fracture of medial femoral condyle  Pt then ordered to be NWB in LLE  Spoke w/ Danie Mortimer from Ortho and explained that pt had already been up ambulating on LLE w/ cane  No concern at this time regarding pt having been full weight bearing during time of evaluation, but to remain NWB in LLE until further imaging is completed  Pt has air cast splint for R ankle  Pt is dx'd w/ ambulatory dysfunction  Pt  has a past medical history of Asthma, Asthma with COPD ([address] ), Closed fracture of fourth lumbar vertebra ([address] ), COPD (chronic obstructive pulmonary disease) ([address] ), Heart murmur, Laceration of finger, Lipoma, Migraine, Multiple sclerosis ([address] ), Obstructive sleep apnea, Osteoarthritis, Post traumatic stress disorder, Tenosynovitis of thumb, and Weight gain  Pt with active OT orders   Pt lives with 26 y/o son in 2 SH, 2 REAL, 11 steps to 2nd floor bed/bath  Pt was I w/ ADLS and IADLS, does not drive (uses public transportation), & required no use of DME PTA  Pt is currently demonstrating the following occupational deficits: S UB ADLS, Min A LB ADLS, S transfers and Min A functional mobility w/ use of SPC for support in standing  These deficits that are impacting pt's baseline areas of occupation are a result of the following impairments: pain, endurance, activity tolerance, functional mobility, balance, functional standing tolerance, unsupportive home environment, decreased I w/ ADLS/IADLS, decreased safety awareness and decreased insight into deficits  The following Occupational Performance Areas to address include: grooming, bathing/shower, toilet hygiene, dressing, socialization, health maintenance, functional mobility, community mobility, clothing management, cleaning, meal prep and household maintenance  Pt scored overall 80/100 on the Barthel Index  Based on the aforementioned OT evaluation, functional performance deficits, and assessments, pt has been identified as a high complexity evaluation  Anticipate pt will be able to D/C home w/ increased family support at D/C  However, if pt is to remain NWB in LLE then pt will need to be re-evaluated regarding her ability to engage in functional tasks while managing WBS   Pt to continue t  Recommendation: PT consult  OT Discharge Recommendation: Home with family support(pending final WBS of LLE)        Yun Martinez MS, OTR/L

## 2019-07-07 NOTE — DISCHARGE SUMMARY
Discharge Summary - [facility]    Patient Information: All Marx 66 y o  female MRN: [id number]  Unit/Bed#: [facility] 912-28 Encounter: [id number]    Discharging Physician / Practitioner: Destini Domínguez  PCP: Destini Patrick  Admission Date: 7/6/2019  Discharge Date: 07/07/19    Disposition:     Home with VNA Services (Reminder: Complete face to face encounter)    Reason for Admission: accidental fall with ambulatory dysfunction    Discharge Diagnoses:     Principal Problem:    Ambulatory dysfunction  Active Problems:    Fibromyalgia    Chronic pain disorder    Right ankle sprain    Anxiety and depression  Resolved Problems:    * No resolved hospital problems  *      Consultations During Hospital Stay:  · Dr German Guo ortho    Procedures Performed:     · Right ankle xray:No acute osseous abnormality  · Left knee xray: No acute osseous abnormality  · Pelvis xray: No acute osseous abnormality  · Lumbar xray: Minimal loss of height of the superior endplate of L1   Acute compression fracture not excluded  · Xray tibia fibula: Suspect fracture of the medial femoral condyle  · Left femur xray: Cortical defect medial femoral condyle concerning for nondisplaced fracture  · Right ankle repeat xray:No acute osseous abnormality nor change  Significant Findings / Test Results:   See above     Incidental Findings:   · none    Test Results Pending at Discharge (will require follow up):   · none     Outpatient Tests Requested:  · Follow up with pcp in one week   · Follow up with orthopedics if ongoing pain  · vna for home PT     Complications:  none    Hospital Course:     All Marx is a 66 y o  female patient who originally presented to the hospital on 7/6/2019 due to ambulatory dysfunction sp an accidental fall  Pt has a significant hx of fibromyalgia and PTSD, anxiety and depression  Pt reports walking down a step when she twisted her right ankle while landing full force onto her left knee  Pt was noted on initial exam to have some edema around left lateral knee ligament as well as her right malleolus  Pt was having great difficulty placing weight on her right ankle  Xrays were intimally read as negative in the er  Upon arrival to the floor and after radiology read it was read that pt had sustained a uspect fracture of the medial femoral condyle  Orthopedics were ordered and after review it seemed that this fracture was noted on prior films from 6/12/2014  She was ok to bear weight on bl lower extremities as tolerated  It would be ok for her to use a hinged knee brace on the left knee if pain was not improving  Recommendations dietary behavior modifications, nutrition and physical activity/  Pt was ok for discharge with home PT    Condition at Discharge: fair     Discharge Day Visit / Exam:     Subjective:  Pt reports having a lot of pain in her right ankle more than her left knee but left knee   She did ambulate with ot and did ok but it was painful  She is agreeable to vna pt   Vitals: Blood Pressure: 137/68 (07/07/19 1540)  Pulse: 87 (07/07/19 1540)  Temperature: 99 3 °F (37 4 °C) (07/07/19 1540)  Temp Source: Oral (07/06/19 1713)  Respirations: 18 (07/07/19 0739)  Height: 5' 1" (154 9 cm) (07/06/19 1713)  Weight - Scale: 81 6 kg (180 lb) (07/06/19 1713)  SpO2: 94 % (07/07/19 1540)  Exam:   Physical Exam   Constitutional: She is oriented to person, place, and time  She appears well-developed and well-nourished  No distress  HENT:   Head: Normocephalic and atraumatic  Mouth/Throat: No oropharyngeal exudate  Eyes: Pupils are equal, round, and reactive to light  Conjunctivae and EOM are normal  Right eye exhibits no discharge  Left eye exhibits no discharge  No scleral icterus  Neck: No JVD present  No tracheal deviation present  No thyromegaly present  Cardiovascular: Normal rate  Pulmonary/Chest: Effort normal    Abdominal: Soft     Musculoskeletal: She exhibits edema (right ankle ) and tenderness (right ankle and left knee tenderness with pressure)  Lymphadenopathy:     She has no cervical adenopathy  Neurological: She is oriented to person, place, and time  Skin: No rash noted  She is not diaphoretic  No erythema  No pallor  Psychiatric: She has a normal mood and affect  Discussion with Family: none at bedside     Discharge instructions/Information to patient and family:   See after visit summary for information provided to patient and family  Provisions for Follow-Up Care:  See after visit summary for information related to follow-up care and any pertinent home health orders  Planned Readmission: no plan for readmission     Discharge Statement:  I spent 45 minutes discharging the patient  This time was spent on the day of discharge  I had direct contact with the patient on the day of discharge  Greater than 50% of the total time was spent examining patient, answering all patient questions, arranging and discussing plan of care with patient as well as directly providing post-discharge instructions  Additional time then spent on discharge activities  Discharge Medications:  See after visit summary for reconciled discharge medications provided to patient and family        ** Please Note: This note has been constructed using a voice recognition system **

## 2019-07-07 NOTE — ASSESSMENT & PLAN NOTE
Will continue Neurontin  Also has been on oxycodone  Would need to be careful regarding use of pain medications; will continue only her oxycodone p o  Medications while here and as of the moment, would not add any other narcotic meds  Acetaminophen continue

## 2019-07-07 NOTE — SOCIAL WORK
CM met with Pt with an introduction and explanation of role  Pt reported her teenage son Burt Schmidt resides with her in a 2 story home with no use of DME and 2 steps to enter the home  Pt reported being independent with ADLs with no hx of VNA or SNF  Pt denied any hx of mental health or drug/alcohol placements  Pt has bipolar, depression, PTSD and anxiety amongst the list of diagnoses  Pt denied having a living will, reported her use of [facility] on [address] [place] [address] and has [email or web address] for PCP needs  CM reviewed d/c planning process including the following: identifying help at home, patient preference for d/c planning needs, Discharge Lounge, [facility] Meds to Bed program, availability of treatment team to discuss questions or concerns patient and/or family may have regarding understanding medications and recognizing signs and symptoms once discharged  CM also encouraged patient to follow up with all recommended appointments after discharge  Patient advised of importance for patient and family to participate in managing patients medical well being

## 2019-07-07 NOTE — ASSESSMENT & PLAN NOTE
Occupational therapy consult  Case management consult  Geriatric pain treatment  Toradol 15 mg intravenous every 6 hours as needed for moderate pain; rest and ice  Patient is on oxycodone 5 mg every 4 hours as needed for severe pain

## 2019-07-07 NOTE — ASSESSMENT & PLAN NOTE
Secondary to acute mechanical fall with Suspected fracture of the medial femoral condyle  Discussed with ortho will place consult as pt is tender over entire anterior left knee   Occupational therapy have seen pt at this time will defer until ortho place recommendations I have placed pt on nonweight bearing at this time   Case management consult  Toradol 15 mg intravenous every 6 hours as needed for moderate pain; rest and ice  Patient is on oxycodone 5 mg every 4 hours as needed for severe pain

## 2019-07-07 NOTE — H&P
H&P- Philipp Velasquez 1960, 66 y o  female MRN: [id number]    Unit/Bed#: ED 29 Encounter: [id number]    Primary Care Provider: AISHWARYA Mcneil   Date and time admitted to hospital: 7/6/2019  5:10 PM        * Ambulatory dysfunction  Assessment & Plan  Occupational therapy consult  Case management consult  Geriatric pain treatment  Toradol 15 mg intravenous every 6 hours as needed for moderate pain; rest and ice  Patient is on oxycodone 5 mg every 4 hours as needed for severe pain  Chronic pain disorder  Assessment & Plan  Will continue Neurontin  Also has been on oxycodone  Would need to be careful regarding use of pain medications; will continue only her oxycodone p o  Medications while here and as of the moment, would not add any other narcotic meds  Acetaminophen continue  Anxiety and depression  Assessment & Plan  Continue trazodone and Zyprexa  Fibromyalgia  Assessment & Plan  As before with Neurontin  VTE Prophylaxis: Enoxaparin (Lovenox)  / sequential compression device   Code Status: Prior do not resuscitate level 3 as discussed twice  POLST: There is no POLST form on file for this patient (pre-hospital)    Anticipated Length of Stay:  Patient will be admitted on an Observation basis with an anticipated length of stay of  less than 2 midnights  Justification for Hospital Stay: Please see detailed plans noted above  Chief Complaint:     Accidental fall with ambulatory dysfunction  History of Present Illness:  Philipp Velasquez is a 66 y o  female who with fibromyalgia and PTSD anxiety and depression; unfortunately, patient fell as she took a misstep and twisted her right ankle while landing on her left knee  Currently lateral knee ligament is swollen and tender as well as right lateral malleolus  As of the moment, patient is unable to step and bear weight  X-rays have been taken which currently does not show any fracture    That said the patient is placed in hospital for ambulatory dysfunction  Will get occupational therapy to evaluate patient possible case management for ambulatory dysfunction and post discharge planning  She denies any other complaints  Review of Systems:    Constitutional:  Denies fever or chills   Eyes:  Denies change in visual acuity   HENT:  Denies nasal congestion or sore throat   Respiratory:  Denies cough or shortness of breath   Cardiovascular:  Denies chest pain or edema   GI:  Denies abdominal pain, nausea, vomiting, bloody stools or diarrhea   :  Denies dysuria   Musculoskeletal:  Denies back pain however joint pain located at right lateral malleolus and left lateral knee    Integument:  Denies rash   Neurologic:  Denies headache, focal weakness or sensory changes   Endocrine:  Denies polyuria or polydipsia   Lymphatic:  Denies swollen glands   Psychiatric:  Denies depression or anxiety     Past Medical and Surgical History:   Past Medical History:   Diagnosis Date    Asthma     Asthma with COPD ([facility] )     last assessed-9/1/2017    Closed fracture of fourth lumbar vertebra (HCC)     unspecified fracture morphology, initial [id number]-[id number]-2/31/2017    [facility]     Heart murmur     last assessed-5/4/2012    Laceration of finger     last assessed-2/26/2014    Lipoma     last assessed-5/3/2013    Migraine     last assessed-1/16/2013    Multiple sclerosis ([facility] )     last assessed-11/14/20171057-vtuzduxav-[id number]    Obstructive sleep apnea     last assessed-9/23/2013    Osteoarthritis     last assessed-5/4/2012    Post traumatic stress disorder     last assessed-12/13/2013    Tenosynovitis of thumb     left thumb-septic-last assessed-3/26/2014    Weight gain     last assessed-10/17/2013-50lb in about a year with frequent steroids     Past Surgical History:   Procedure Laterality Date    FINGER SURGERY      KNEE ARTHROSCOPY W/ MENISCAL REPAIR Right     description: 4/2012-KNEE ARTHROSCOPY W/ MEDIAL MENISCAL REPAIR        Meds/Allergies:    (Not in a hospital admission)    Allergies: Allergies   Allergen Reactions    No Known Allergies      History:  Marital Status:    Occupation:  She is unemployed and on disability due to MS  Patient Pre-hospital Living Situation:  Living at home  Patient Pre-hospital Level of Mobility:  Normally able to ambulate  Patient Pre-hospital Diet Restrictions:  Regular  Substance Use History:   Social History     Substance and Sexual Activity   Alcohol Use No     Social History     Tobacco Use   Smoking Status Current Every Day Smoker    Packs/day: 0 50    Years: 25 00    Pack years: 12 50    Types: Cigarettes   Smokeless Tobacco Never Used     Social History     Substance and Sexual Activity   Drug Use No       Family History:  Family History   Problem Relation Age of Onset    Hepatitis Mother         C    Asthma Father     COPD Father     Diabetes Father     Hypertension Father     Asthma Brother        Physical Exam:     Vitals:   Blood Pressure: 116/58 (07/06/19 1900)  Pulse: 62 (07/06/19 1900)  Temperature: 98 4 °F (36 9 °C) (07/06/19 1712)  Temp Source: Oral (07/06/19 1713)  Respirations: 18 (07/06/19 1900)  Height: 5' 1" (154 9 cm) (07/06/19 1713)  Weight - Scale: 81 6 kg (180 lb) (07/06/19 1713)  SpO2: 96 % (07/06/19 1900)    Constitutional:  Well developed, well nourished, no acute distress, non-toxic appearance   Eyes:  PERRL, conjunctiva normal   HENT:  Atraumatic, external ears normal, nose normal, oropharynx moist, no pharyngeal exudates   Neck- normal range of motion, no tenderness, supple   Respiratory:  No respiratory distress, normal breath sounds, no rales, no wheezing   Cardiovascular:  Normal rate, normal rhythm, no murmurs, no gallops, no rubs   GI:  Soft, nondistended, normal bowel sounds, nontender, no organomegaly, no mass, no rebound, no guarding   :  No costovertebral angle tenderness   Musculoskeletal:  Swelling with note of tenderness located at right lateral malleolus  Also left lateral collateral ligament  swelling with tenderness  Integument:  Well hydrated, no rash   Lymphatic:  No lymphadenopathy noted   Neurologic:  Alert &awake, communicative, CN 2-12 normal, normal motor function, normal sensory function, no focal deficits noted   Psychiatric:  Speech and behavior appropriate       Lab Results: I have personally reviewed pertinent reports  Laboratories pending for tomorrow    Imaging: I have personally reviewed pertinent reports  No results found  ** Please Note: Dragon 360 Dictation voice to text software was used in the creation of this document   **

## 2019-07-07 NOTE — CONSULTS
Orthopedics   Chapman Ahumada 66 y o  female MRN: [id number]  Unit/Bed#: [facility] 615-01      Chief Complaint:   left knee pain, right ankle pain    HPI:   66 y  o female sp fall complaining of left knee and right ankle pain  Patient reports that she rolled her ankle over a curb yesterday, causing her to fall and land on her left knee  She was seen in the ED, and right ankle x-rays were initially read as negative, and x-rays of the left knee were read as cortical defect in the medial femoral condyle concerning for nondisplaced fracture   She was admitted to the medical service for ambulatory dysfunction  Since that point she has been able to ambulate with physical therapy, but continues to have right ankle and left knee pain  She reports that the majority of her ankle pain is anterior, and the majority of her knee pain is lateral   No weakness, numbness, or paresthesias  She reports a past medical history of COPD, fibromyalgia, PTSD, and multiple sclerosis      Review Of Systems:   · Skin: Normal  · Neuro: See HPI  · Musculoskeletal: See HPI  · 14 point review of systems negative except as stated above     Past Medical History:   Past Medical History:   Diagnosis Date    Asthma     Asthma with COPD ([facility] )     last assessed-9/1/2017    Closed fracture of fourth lumbar vertebra ([facility] )     unspecified fracture morphology, initial [id number]-szotquud-1/31/2017    COPD (chronic obstructive pulmonary disease) (HCC)     Heart murmur     last assessed-5/4/2012    Laceration of finger     last assessed-2/26/2014    Lipoma     last assessed-5/3/2013    Migraine     last assessed-1/16/2013    Multiple sclerosis ([facility] )     last assessed-11/14/20177032-kilhktvoh-mppsmdggi    Obstructive sleep apnea     last assessed-9/23/2013    Osteoarthritis     last assessed-5/4/2012    Post traumatic stress disorder     last assessed-12/13/2013    Tenosynovitis of thumb     left thumb-septic-last assessed-3/26/2014    Weight gain last assessed-10/17/2013-50lb in about a year with frequent steroids       Past Surgical History:   Past Surgical History:   Procedure Laterality Date    FINGER SURGERY      KNEE ARTHROSCOPY W/ MENISCAL REPAIR Right     description: 4/2012-KNEE ARTHROSCOPY W/ MEDIAL MENISCAL REPAIR        Family History:  Family history reviewed and non-contributory  Family History   Problem Relation Age of Onset    Hepatitis Mother         C    Asthma Father     COPD Father     Diabetes Father     Hypertension Father     Asthma Brother        Social History:  Social History     Socioeconomic History    Marital status:      Spouse name: None    Number of children: None    Years of education: None    Highest education level: None   Occupational History    None   Social Needs    Financial resource strain: None    Food insecurity:     Worry: None     Inability: None    Transportation needs:     Medical: None     Non-medical: None   Tobacco Use    Smoking status: Current Every Day Smoker     Packs/day: 0 50     Years: 25 00     Pack years: 12 50     Types: Cigarettes    Smokeless tobacco: Never Used   Substance and Sexual Activity    Alcohol use: No    Drug use: No    Sexual activity: None   Lifestyle    Physical activity:     Days per week: None     Minutes per session: None    Stress: None   Relationships    Social connections:     Talks on phone: None     Gets together: None     Attends Worship service: None     Active member of club or organization: None     Attends meetings of clubs or organizations: None     Relationship status: None    Intimate partner violence:     Fear of current or ex partner: None     Emotionally abused: None     Physically abused: None     Forced sexual activity: None   Other Topics Concern    None   Social History Narrative    Daily coffee consumption (1 cups/day)    Disabled    [place]    No living will    Spouse/family support       Allergies:    Allergies   Allergen Reactions    No Known Allergies            Labs:  0   Lab Value Date/Time    HCT 42 0 07/07/2019 0438    HCT 50 4 (H) 10/11/2018 1718    HCT 47 0 (H) 06/08/2018 1112    HCT 44 2 02/23/2014 1710    HCT 46 5 (H) 02/15/2014 1203    HCT 46 3 (H) 02/13/2014 0643    HGB 13 3 07/07/2019 0438    HGB 16 2 (H) 10/11/2018 1718    HGB 14 7 06/08/2018 1112    HGB 15 0 02/23/2014 1710    HGB 15 3 02/15/2014 1203    HGB 14 7 02/13/2014 0643    INR 1 02 10/11/2018 1718    INR 1 04 02/15/2014 1203    WBC 7 93 07/07/2019 0438    WBC 12 95 (H) 10/11/2018 1718    WBC 7 05 06/08/2018 1112    WBC 12 61 (H) 02/23/2014 1710    WBC 7 78 02/15/2014 1203    WBC 8 78 02/13/2014 0643       Meds:    Current Facility-Administered Medications:     acetaminophen (TYLENOL) tablet 650 mg, 650 mg, Oral, Q4H PRN, Lahco Mistry MD    albuterol (PROVENTIL HFA,VENTOLIN HFA) inhaler 2 puff, 2 puff, Inhalation, Q4H PRN, Lacho Mistry MD    aluminum-magnesium hydroxide-simethicone (MYLANTA) 200-200-20 mg/5 mL oral suspension 30 mL, 30 mL, Oral, Q6H PRN, Lacho Mistry MD    docusate sodium (COLACE) capsule 100 mg, 100 mg, Oral, BID PRN, Lacho Mistry MD    enoxaparin (LOVENOX) subcutaneous injection 40 mg, 40 mg, Subcutaneous, Daily, Lacho Mistry MD, 40 mg at 07/07/19 0820    gabapentin (NEURONTIN) capsule 100 mg, 100 mg, Oral, HS, Lacho Mistry MD, 100 mg at 07/06/19 2213    ketorolac (TORADOL) injection 15 mg, 15 mg, Intravenous, Q6H PRN, Lacho Mistry MD    LORazepam (ATIVAN) tablet 0 5 mg, 0 5 mg, Oral, Daily PRN, Lacho Mistry MD    methocarbamol (ROBAXIN) tablet 750 mg, 750 mg, Oral, Q8H [facility], Lacho Mistry MD, 750 mg at 07/07/19 1335    nicotine (NICODERM CQ) 14 mg/24hr TD 24 hr patch 1 patch, 1 patch, Transdermal, Daily, Lacho Mistry MD    OLANZapine (ZyPREXA) tablet 2 5 mg, 2 5 mg, Oral, HS, Lacho Mistry MD, 2 5 mg at 07/06/19 1713    ondansetron (ZOFRAN) injection 4 mg, 4 mg, Intravenous, Q6H PRN, Prashanth Blue MD    oxyCODONE (ROXICODONE) IR tablet 5 mg, 5 mg, Oral, Q8H PRN, Prashanth Blue MD    SUMAtriptan (IMITREX) tablet 100 mg, 100 mg, Oral, Daily PRN, Prashanth Blue MD    tiotropium [facility]) capsule for inhaler 18 mcg, 18 mcg, Inhalation, Daily, Prashanth Blue MD, 18 mcg at 07/07/19 0820    traZODone (DESYREL) tablet 200 mg, 200 mg, Oral, HS, Prashanth Blue MD, 200 mg at 07/06/19 2213    Blood Culture:   Lab Results   Component Value Date    BLOODCX No Growth After 5 Days  03/16/2017    BLOODCX No Growth After 5 Days  03/16/2017       Wound Culture:   No results found for: WOUNDCULT    Ins and Outs:  I/O last 24 hours: In: 430 [P O :430]  Out: -           Physical Exam:   /65   Pulse 70   Temp 98 2 °F (36 8 °C)   Resp 18   Ht 5' 1" (1 549 m)   Wt 81 6 kg (180 lb)   SpO2 96%   BMI 34 01 kg/m²   Gen: Alert and oriented to person, place, time  HEENT: EOMI, eyes clear, moist mucus membranes, hearing intact  Respiratory: Bilateral chest rise  No audible wheezing found  Cardiovascular: Regular Rate and Rhythm  Abdomen: soft nontender/nondistended  Musculoskeletal: bilateral lower extremity  · Skin intact over the knee and ankle  · Tender to palpation diffusely about the right ankle and diffusely about the left knee  · Able to perform straight leg raise bilaterally  · Right knee has minimal laxity with valgus stress, it is otherwise stable to varus, anterior drawer, Lachman's, posterior drawer  · Sensation intact L1-S1 bilaterally  · Motor intact to hip flexion, knee flexion/extension, ankle dorsi/plantar flexion, EHL/FHL  · 2+ DP pulse bilaterally    Radiology:   I personally reviewed the films  X-rays right ankle shows no acute fracture dislocation  X-rays of the left knee reveal cortical defect of the medial femoral condyle which is also visualized on xray of the left knee from 6/12/2014  _*_*_*_*_*_*_*_*_*_*_*_*_*_*_*_*_*_*_*_*_*_*_*_*_*_*_*_*_*_*_*_*_*_*_*_*_*_*_*_*_*    Assessment:  66 y  o female with right ankle sprain, left knee pain  The xray abnormality on the left knee is seen on previous films from 6/12/2014, so in the setting of an ambulatory patient there is unlikely to be an acute fracture  WBAT bl lower extremities  Plan:   · Weight bearing as tolerated bilateral lower extremities  · Ok to use hinged knee brace on left knee if patient experiences instability  · PT  · Pain control  · Body mass index is 34 01 kg/m²  mildly obese  Recommend behavior modifications, nutrition and physical activity    · Dispo: Marycruz Done for discharge from ortho perspective    Bobo Silverman MD

## 2019-07-07 NOTE — OCCUPATIONAL THERAPY NOTE
[address] Evaluation     Patient Name: Gisselle Wagner  BTLGE'U Date: 7/7/2019  Problem List  Patient Active Problem List   Diagnosis    Multiple sclerosis, relapsing-remitting ([facility] )    Migraine    PTSD (post-traumatic stress disorder)    Vitamin D deficiency    Fibromyalgia    Elevated LDL cholesterol level    Degenerative lumbar spinal stenosis    Chronic pain disorder    Ast[facility] with COPD ([facility]    Anxiety and depression    Chronic pain syndrome    Bipolar 1 disorder ([facility] )    Ambulatory dysfunction     Past Medical History  Past Medical History:   Diagnosis Date    Asthma     Asthma with COPD ([facility] )     last assessed-9/1/2017    Closed fracture of fourth lumbar vertebra ([facility] )     unspecified fracture morphology, initial cqecsnuij-owiodrtb-2/31/2017    COPD (chronic obstructive pulmonary disease) ([facility] )     Heart murmur     last assessed-5/4/2012    Laceration of finger     last assessed-2/26/2014    Lipoma     last assessed-5/3/2013    Migraine     last assessed-1/16/2013    Multiple sclerosis ([facility] )     last assessed-11/14/20173444-bqvqlyjxg-dfdzvilap    Obstructive sleep apnea     last assessed-9/23/2013    Osteoarthritis     last assessed-5/4/2012    Post traumatic stress disorder     last assessed-12/13/2013    Tenosynovitis of thumb     left thumb-septic-last assessed-3/26/2014    Weight gain     last assessed-10/17/2013-50lb in about a year with frequent steroids     Past Surgical History  Past Surgical History:   Procedure Laterality Date    FINGER SURGERY      KNEE ARTHROSCOPY W/ MENISCAL REPAIR Right     description: 4/2012-KNEE ARTHROSCOPY W/ MEDIAL MENISCAL REPAIR              07/07/19 1140   Note Type   Note type Eval/Treat   Restrictions/Precautions   Weight Bearing Precautions Per Order Yes   LLE Weight Bearing Per Order NWB   Braces or Orthoses Splint  (air splint cast R ankle)   Other Precautions Fall Risk;Pain; Chair Alarm   Pain Assessment   Pain Assessment 0-10 Pain Score 5   Pain Type Acute pain   Pain Location Ankle   Pain Orientation Right   Pain Descriptors Aching;Discomfort   Pain Frequency Constant/continuous   Pain Onset Ongoing   Clinical Progression Not changed   Patient's Stated Pain Goal No pain   Hospital Pain Intervention(s) Repositioned; Ambulation/increased activity; Emotional support   Response to Interventions tolerated   Multiple Pain Sites Yes   Pain 2   Pain Score 2 3   Ruiz-Laird FACES Pain Rating 2 2   Pain Type 2 Acute pain   Pain Location 2 Knee   Pain Orientation 2 Left   Pain Descriptors 2 Aching;Discomfort   Pain Frequency 2 Constant/continuous   Pain Onset 2 On-going   Clinical Progression 2 Not changed   Pain Intervention(s) 2 Repositioned; Ambulation/increased activity; Emotional support   Response to Interventions 2 tolerated   Home Living   Type of [address] [place] [address] Two level; Other (Comment); Bed/bath upstairs  (2 REAL)   Bathroom Shower/Tub Tub/shower unit   Bathroom Toilet Standard   Bathroom Equipment Other (Comment)  (denies any)   Home Equipment Other (Comment)  (denies any)   Additional Comments Pt reports living in 2 SH, 2 REAL, 2nd floor setup w/ 11 steps to 2nd floor   Prior Function   Level of [place] Independent with ADLs and functional mobility   Lives With Son   Receives Help From Family   ADL Assistance Independent   IADLs Independent   Falls in the last 6 months 1 to 4   Vocational Other (Comment)  (does not work)   Comments Pt reports being I w/ ADLS, IADLS, transfers and functional mobility PTA   Lifestyle   Autonomy I ADLS, IADLS, transfers and functional mobility PTA   Reciprocal Relationships Pt lives w/ 26 y/o son who is home and can help during the day   Service to Others Pt does not work   Intrinsic Gratification Pt enjoys decorating her house   Psychosocial   Psychosocial (WDL) [address] [place] [address]  Supervision/Setup LB Bathing Assistance 4  Minimal Assistance   UB Dressing Assistance 5  Supervision/Setup   LB Dressing Assistance 4  Minimal Assistance   Toileting Assistance  4  Minimal Assistance   Functional Assistance 4  Minimal Assistance  (CTG)   Bed Mobility   Supine to Sit 6  Modified independent   Additional items HOB elevated; Increased time required;Verbal cues   Sit to Supine Unable to assess   Additional Comments Pt went from supine to sit w/ Mod I; HOB Elevated for assist  Sat EOB w/ G balance/trunk control   Transfers   Sit to Stand 5  Supervision   Additional items Verbal cues   Stand to Sit 5  Supervision   Additional items Verbal cues   Additional Comments Pt performed sit-stand from EOB w/ S for safety and 1 sit-stand from recliner w/ S for safety   Functional Mobility   Functional Mobility 4  Minimal assistance   Additional Comments Pt ambulated short household distance w/ CTG for safety/balance  Use of cane for support in standing  Additional items SPC   Balance   Static Sitting Good   Dynamic Sitting Fair +   Static Standing Fair   Dynamic Standing Fair -   Ambulatory Fair -   Activity Tolerance   Activity Tolerance Patient tolerated treatment well;Patient limited by pain   Nurse Made Aware yes   RUE Assessment   RUE Assessment WFL   LUE Assessment   LUE Assessment WFL   Hand Function   Gross Motor Coordination Functional   Fine Motor Coordination Functional   Sensation   Light Touch No apparent deficits   Cognition   Overall Cognitive Status WFL   Arousal/Participation Responsive; Cooperative   Attention Within functional limits   Orientation Level Oriented X4   Memory Within functional limits   Following Commands Follows all commands and directions without difficulty   Comments Pt is pleasant and cooperative   Assessment   Limitation Decreased ADL status; Decreased endurance;Decreased Safe judgement during ADL;Decreased self-care trans;Decreased high-level ADLs   Prognosis Fair   Assessment Pt is a 65 y/o female seen for OT eval s/p adm to [facility] after a fall, twisting her right ankle and landing on left knee  X-rays negative for any fractures  At time of evaluation pt had no weight bearing restrictions and pending discharge  After evaluation, pt had continued tenderness in L knee and w/ further imaging, pt may have a suspect fracture of medial femoral condyle  Pt then ordered to be NWB in LLE  Spoke w/ Danie Mortimer from Ortho and explained that pt had already been up ambulating on LLE w/ cane, as previously pt had no weight bearing restrictions  Ortho presents w/ no concern at this time regarding pt having been full weight bearing during time of evaluation, but to remain NWB in LLE until further imaging is completed  Pt has air cast splint for R ankle  Pt is dx'd w/ ambulatory dysfunction  Pt  has a past medical history of Asthma, Asthma with COPD ([facility] ), Closed fracture of fourth lumbar vertebra ([facility] ), COPD (chronic obstructive pulmonary disease) ([facility] ), Heart murmur, Laceration of finger, Lipoma, Migraine, Multiple sclerosis ([facility] ), Obstructive sleep apnea, Osteoarthritis, Post traumatic stress disorder, Tenosynovitis of thumb, and Weight gain  Pt with active OT orders  Pt lives with 26 y/o son in 2 SH, 2 REAL, 11 steps to 2nd floor bed/bath  Pt was I w/ ADLS and IADLS, does not drive (uses public transportation), & required no use of DME PTA  Pt is currently demonstrating the following occupational deficits: S UB ADLS, Min A LB ADLS, S transfers and Min A functional mobility w/ use of SPC for support in standing  These deficits that are impacting pt's baseline areas of occupation are a result of the following impairments: pain, endurance, activity tolerance, functional mobility, balance, functional standing tolerance, unsupportive home environment, decreased I w/ ADLS/IADLS, decreased safety awareness and decreased insight into deficits  The following Occupational Performance Areas to address include: grooming, bathing/shower, toilet hygiene, dressing, socialization, health maintenance, functional mobility, community mobility, clothing management, cleaning, meal prep and household maintenance  Pt scored overall 80/100 on the Barthel Index  Based on the aforementioned OT evaluation, functional performance deficits, and assessments, pt has been identified as a high complexity evaluation  Anticipate pt will be able to D/C home w/ increased family support at D/C  However, if pt is to remain NWB in LLE then pt will need to be re-evaluated regarding her ability to engage in functional tasks while managing WBS  Pt to continue to benefit from acute immediate OT services to address the following goals 3-5x/week to  w/in 7-10 days:    Goals   Patient Goals to be independent again   LTG Time Frame 7-10   Long Term Goal #1 see below listed goals   Plan   Treatment Interventions ADL retraining;Functional transfer training;UE strengthening/ROM; Endurance training;Cognitive reorientation;Patient/family training;Equipment evaluation/education; Compensatory technique education;Continued evaluation; Energy conservation; Activityengagement   Goal Expiration Date 19   OT Frequency 3-5x/wk   Recommendation   Recommendation PT consult   OT Discharge Recommendation Home with family support  (pending final WBS of LLE)   Equipment Recommended Tub seat with back; Other (comment)  (SPC)   Barthel Index   Feeding 10   Bathing 0   Grooming Score 5   Dressing Score 5   Bladder Score 10   Bowels Score 10   Toilet Use Score 10   Transfers (Bed/Chair) Score 15   Mobility (Level Surface) Score 10   Stairs Score 5   Barthel Index Score 80   Modified [place] Scale   Modified [place] Scale 3      GOALS    1) Pt will improve activity tolerance to G for min 30 min txment sessions for increase engagement in functional tasks  2) Pt will complete UB/LB dressing/self care w/ Mod I using adaptive device and DME as needed  3) Pt will complete bathing w/ Mod I w/ use of AE and DME as needed  4) Pt will complete toileting w/ Mod I w/ G hygiene/thoroughness using DME as needed  5) Pt will improve functional transfers to Mod I on/off all surfaces using DME as needed w/ G balance/safety   6) Pt will improve functional mobility during ADL/IADL/leisure tasks to Mod I using DME as needed w/ G balance/safety   7) Pt will participate in simulated IADL management task to increase independence to S w/ G safety and endurance  8) Pt will be attentive 100% of the time during ongoing cognitive assessment w/ G participation to assist w/ safe d/c planning/recommendations  9) Pt will demonstrate G carryover of pt/caregiver education and training as appropriate w/o cues w/ good tolerance to increase safety during functional tasks  10) Pt will demonstrate 100% carryover of energy conservation techniques t/o functional I/ADL/leisure tasks w/o cues s/p skilled education to increase endurance during functional tasks     Meir Flores MS, OTR/L

## 2019-07-07 NOTE — ASSESSMENT & PLAN NOTE
Will continue Neurontin  Also has been on oxycodone  Would need to be careful regarding use of pain medications; will continue only her oxycodone p o  Medications while here and as of the moment, would not add any other narcotic meds     Acetaminophen continue, atc

## 2019-07-07 NOTE — ASSESSMENT & PLAN NOTE
As before with Neurontin    May be contributing to severity of pts pain in left knee and right ankle

## 2019-07-07 NOTE — PLAN OF CARE
Problem: Potential for Falls  Goal: Patient will remain free of falls  Description  INTERVENTIONS:  - Assess patient frequently for physical needs  -  Identify cognitive and physical deficits and behaviors that affect risk of falls    -  [place] fall precautions as indicated by assessment   - Educate patient/family on patient safety including physical limitations  - Instruct patient to call for assistance with activity based on assessment  - Modify environment to reduce risk of injury  - Consider OT/PT consult to assist with strengthening/mobility  Outcome: Progressing     Problem: PAIN - ADULT  Goal: Verbalizes/displays adequate comfort level or baseline comfort level  Description  Interventions:  - Encourage patient to monitor pain and request assistance  - Assess pain using appropriate pain scale  - Administer analgesics based on type and severity of pain and evaluate response  - Implement non-pharmacological measures as appropriate and evaluate response  - Consider cultural and social influences on pain and pain management  - Notify physician/advanced practitioner if interventions unsuccessful or patient reports new pain  Outcome: Progressing     Problem: INFECTION - ADULT  Goal: Absence or prevention of progression during hospitalization  Description  INTERVENTIONS:  - Assess and monitor for signs and symptoms of infection  - Monitor lab/diagnostic results  - Monitor all insertion sites, i e  indwelling lines, tubes, and drains  - Monitor endotracheal (as able) and nasal secretions for changes in amount and color  - [place] appropriate cooling/warming therapies per order  - Administer medications as ordered  - Instruct and encourage patient and family to use good hand hygiene technique  - Identify and instruct in appropriate isolation precautions for identified infection/condition  Outcome: Progressing     Problem: SAFETY ADULT  Goal: Patient will remain free of falls  Description  INTERVENTIONS:  - Assess patient frequently for physical needs  -  Identify cognitive and physical deficits and behaviors that affect risk of falls    -  [place] fall precautions as indicated by assessment   - Educate patient/family on patient safety including physical limitations  - Instruct patient to call for assistance with activity based on assessment  - Modify environment to reduce risk of injury  - Consider OT/PT consult to assist with strengthening/mobility  Outcome: Progressing  Goal: Maintain or return to baseline ADL function  Description  INTERVENTIONS:  -  Assess patient's ability to carry out ADLs; assess patient's baseline for ADL function and identify physical deficits which impact ability to perform ADLs (bathing, care of mouth/teeth, toileting, grooming, dressing, etc )  - Assess/evaluate cause of self-care deficits   - Assess range of motion  - Assess patient's mobility; develop plan if impaired  - Assess patient's need for assistive devices and provide as appropriate  - Encourage maximum independence but intervene and supervise when necessary  ¯ Involve family in performance of ADLs  ¯ Assess for home care needs following discharge   ¯ Request OT consult to assist with ADL evaluation and planning for discharge  ¯ Provide patient education as appropriate  Outcome: Progressing  Goal: Maintain or return mobility status to optimal level  Description  INTERVENTIONS:  - Assess patient's baseline mobility status (ambulation, transfers, stairs, etc )    - Identify cognitive and physical deficits and behaviors that affect mobility  - Identify mobility aids required to assist with transfers and/or ambulation (gait belt, sit-to-stand, lift, walker, cane, etc )  - [place] fall precautions as indicated by assessment  - Record patient progress and toleration of activity level on Mobility SBAR; progress patient to next Phase/Stage  - Instruct patient to call for assistance with activity based on assessment  - Request Rehabilitation consult to assist with strengthening/weightbearing, etc   Outcome: Progressing     Problem: DISCHARGE PLANNING  Goal: Discharge to home or other facility with appropriate resources  Description  INTERVENTIONS:  - Identify barriers to discharge w/patient and caregiver  - Arrange for needed discharge resources and transportation as appropriate  - Identify discharge learning needs (meds, wound care, etc )  - Arrange for interpretive services to assist at discharge as needed  - Refer to Case Management Department for coordinating discharge planning if the patient needs post-hospital services based on physician/advanced practitioner order or complex needs related to functional status, cognitive ability, or social support system  Outcome: Progressing

## 2019-07-07 NOTE — SOCIAL WORK
CM met with Pt to discuss the post acute care recommendation was made by your care team for [address] 80  Discussed Freedom of Choice with patient  List of agencies given to patient via in person  patient aware the list is custom filtered for them by insurance and that [facility] post acute providers are designated  Pt provided permission for a referral to be made to [facility]  for home PT services  CM made the requested referral  CM provided Pt with the transport assistance of a [employer] as she reported not having any other means of transport at this time  CM faxed a signed waiver to [facility]

## 2019-07-07 NOTE — UTILIZATION REVIEW
Initial Clinical Review    Admission: Date/Time/Statement:  7/6/19 @ 2028 -- OBS    Orders Placed This Encounter   Procedures    Place in Observation (expected length of stay for this patient is less than two midnights)     Standing Status:   Standing     Number of Occurrences:   1     Order Specific Question:   Admitting Physician     Answer:   Adalid Pennington [[id number]]     Order Specific Question:   Level of Care     Answer:   Med Surg [[id number]]     ED Arrival Information     Expected Arrival Acuity Means of Arrival Escorted By Service Admission Type    - 7/6/2019 17:10 [facility] Hospitalist Urgent    Arrival Complaint    -        Chief Complaint   Patient presents with    Ankle Pain     Pt reports she rolled her right ankle coming down the stairs  Pt reports "severe pain" if she trys putting pressure on in  Also c/o left knee pain      Assessment/Plan: 66 y o  female who with fibromyalgia and PTSD anxiety and depression; unfortunately, patient fell as she took a misstep and twisted her right ankle while landing on her left knee  Currently lateral knee ligament is swollen and tender as well as right lateral malleolus  As of the moment, patient is unable to step and bear weight  X-rays have been taken which currently does not show any fracture  That said the patient is placed in hospital for ambulatory dysfunction  Will get occupational therapy to evaluate patient possible case management for ambulatory dysfunction and post discharge planning  She denies any other complaints  Ambulatory dysfunction  Assessment & Plan  Occupational therapy consult  Case management consult  Geriatric pain treatment  Toradol 15 mg intravenous every 6 hours as needed for moderate pain; rest and ice  Patient is on oxycodone 5 mg every 4 hours as needed for severe pain      Chronic pain disorder  Assessment & Plan  Will continue Neurontin  Also has been on oxycodone    Would need to be careful regarding use of pain medications; will continue only her oxycodone p o  Medications while here and as of the moment, would not add any other narcotic meds  Acetaminophen continue      Anxiety and depression  Assessment & Plan  Continue trazodone and Zyprexa      Fibromyalgia  Assessment & Plan  As before with Neurontin      7/7 --- PT/OT EVALS PENDING       ED Triage Vitals   Temperature Pulse Respirations Blood Pressure SpO2   07/06/19 1712 07/06/19 1713 07/06/19 1713 07/06/19 1713 07/06/19 1713   98 4 °F (36 9 °C) 87 20 163/66 96 %      Temp Source Heart Rate Source Patient Position - Orthostatic VS BP Location FiO2 (%)   07/06/19 1712 07/06/19 1713 07/06/19 1713 07/06/19 1713 --   Oral Monitor Lying Left arm       Pain Score       07/06/19 1713       Worst Possible Pain        Wt Readings from Last 1 Encounters:   07/06/19 81 6 kg (180 lb)     Additional Vital Signs:   Date/Time  Temp  Pulse  Resp  BP  SpO2  O2 Device   07/07/19 0739  98 2 °F (36 8 °C)  70  18  124/65  96 %  None (Room air)   07/06/19 2328  99 °F (37 2 °C)  72  18  100/57  95 %     07/06/19 2124    78    132/72  97 %     07/06/19 2119  98 8 °F (37 1 °C)             07/06/19 2105      18      None (Room air)   07/06/19 2100    64  16  114/60  99 %     07/06/19 1900    62  18  116/58  96 %  None (Room air)   07/06/19 1830    70  19  113/56  96 %  None (Room air)   07/06/19 1730    74  18  121/58  98 %  None (Room air)   07/06/19 1713    87  20  163/66  96 %  None (Room air)   07/06/19 1712  98 4 °F (36 9 °C)                 Pertinent Labs/Diagnostic Test Results:   Results from last 7 days   Lab Units 07/07/19  0438   WBC Thousand/uL 7 93   HEMOGLOBIN g/dL 13 3   HEMATOCRIT % 42 0   PLATELETS Thousands/uL 233   NEUTROS ABS Thousands/µL 4 09     Results from last 7 days   Lab Units 07/07/19  0438   SODIUM mmol/L 142   POTASSIUM mmol/L 3 7   CHLORIDE mmol/L 110*   CO2 mmol/L 27   ANION GAP mmol/L 5   BUN mg/dL 17   CREATININE mg/dL 0 73   EGFR ml/min/1 73sq m 92   CALCIUM mg/dL 8 4   MAGNESIUM mg/dL 2 0     Results from last 7 days   Lab Units 07/07/19  0438   GLUCOSE RANDOM mg/dL 102     XR lumbar spine 7/7 -- Minimal loss of height of the superior endplate of L1   Acute compression fracture not excluded  XR left tib/fib 7/7 -- Suspect fracture of the medial femoral condyle      ED Treatment:   Medication Administration from 07/06/2019 1710 to 07/06/2019 2107       Date/Time Order Dose Route Action     07/06/2019 1837 acetaminophen (TYLENOL) tablet 975 mg 975 mg Oral Given     07/06/2019 1842 ketorolac (TORADOL) injection 15 mg 15 mg Intravenous Given        Past Medical History:   Diagnosis Date    Asthma     Asthma with COPD ([facility] )     last assessed-9/1/2017    Closed fracture of fourth lumbar vertebra ([facility] )     unspecified fracture morphology, initial maghvbvyt-wzmlnarq-0/31/2017    [facility]     Heart murmur     last assessed-5/4/2012    Laceration of finger     last assessed-2/26/2014    Lipoma     last assessed-5/3/2013    Migraine     last assessed-1/16/2013    Multiple sclerosis ([facility] )     last assessed-11/14/[id number]-wkabxttio-bfkfodqyu    Obstructive sleep apnea     last assessed-9/23/2013    Osteoarthritis     last assessed-5/4/2012    Post traumatic stress disorder     last assessed-12/13/2013    Tenosynovitis of thumb     left thumb-septic-last assessed-3/26/2014    Weight gain     last assessed-10/17/2013-50lb in about a year with frequent steroids     Present on Admission:   Fibromyalgia   Chronic pain disorder   Anxiety and depression      Admitting Diagnosis: Ankle injury [S99 919A]  Right ankle sprain [S93 401A]  Knee strain, left, initial encounter [R96 557W]  Ambulatory dysfunction [R26 2]  Age/Sex: 66 y o  female  Admission Orders:  Tele  Reg diet  SCD's    Current Facility-Administered Medications:  acetaminophen 650 mg Oral Q4H PRN   albuterol 2 puff Inhalation Q4H PRN   aluminum-magnesium hydroxide-simethicone 30 mL Oral Q6H PRN   docusate sodium 100 mg Oral BID PRN   enoxaparin 40 mg Subcutaneous Daily   gabapentin 100 mg Oral HS   ketorolac 15 mg Intravenous Q6H PRN   LORazepam 0 5 mg Oral Daily PRN   methocarbamol 750 mg Oral Q8H [facility]   nicotine 1 patch Transdermal Daily   OLANZapine 2 5 mg Oral HS   ondansetron 4 mg Intravenous Q6H PRN   oxyCODONE 5 mg Oral Q8H PRN   SUMAtriptan 100 mg Oral Daily PRN   tiotropium 18 mcg Inhalation Daily   traZODone 200 mg Oral HS       IP CONSULT TO CASE MANAGEMENT    Network Utilization Review Department  Phone: [phone]; Fax [phone]  [email or web address]  ATTENTION: Please call with any questions or concerns to [phone]  and carefully listen to the prompts so that you are directed to the right person  Send all requests for admission clinical reviews, approved or denied determinations and any other requests to fax [phone]   All voicemails are confidential

## 2019-07-07 NOTE — PLAN OF CARE
Problem: Potential for Falls  Goal: Patient will remain free of falls  Description  INTERVENTIONS:  - Assess patient frequently for physical needs  -  Identify cognitive and physical deficits and behaviors that affect risk of falls    -  [place] fall precautions as indicated by assessment   - Educate patient/family on patient safety including physical limitations  - Instruct patient to call for assistance with activity based on assessment  - Modify environment to reduce risk of injury  - Consider OT/PT consult to assist with strengthening/mobility  7/6/2019 2313 by Rojelio Saldivar RN  Outcome: Progressing  7/6/2019 2311 by Rojelio Saldivar RN  Outcome: Progressing     Problem: PAIN - ADULT  Goal: Verbalizes/displays adequate comfort level or baseline comfort level  Description  Interventions:  - Encourage patient to monitor pain and request assistance  - Assess pain using appropriate pain scale  - Administer analgesics based on type and severity of pain and evaluate response  - Implement non-pharmacological measures as appropriate and evaluate response  - Consider cultural and social influences on pain and pain management  - Notify physician/advanced practitioner if interventions unsuccessful or patient reports new pain  7/6/2019 2313 by Rojelio Saldivar RN  Outcome: Progressing  7/6/2019 2311 by Rojelio Saldivar RN  Outcome: Progressing     Problem: INFECTION - ADULT  Goal: Absence or prevention of progression during hospitalization  Description  INTERVENTIONS:  - Assess and monitor for signs and symptoms of infection  - Monitor lab/diagnostic results  - Monitor all insertion sites, i e  indwelling lines, tubes, and drains  - Monitor endotracheal (as able) and nasal secretions for changes in amount and color  - [place] appropriate cooling/warming therapies per order  - Administer medications as ordered  - Instruct and encourage patient and family to use good hand hygiene technique  - Identify and instruct in appropriate isolation precautions for identified infection/condition  7/6/2019 2313 by Filemon Richardson RN  Outcome: Progressing  7/6/2019 2311 by Filemon Richardson RN  Outcome: Progressing     Problem: SAFETY ADULT  Goal: Patient will remain free of falls  Description  INTERVENTIONS:  - Assess patient frequently for physical needs  -  Identify cognitive and physical deficits and behaviors that affect risk of falls    -  [place] fall precautions as indicated by assessment   - Educate patient/family on patient safety including physical limitations  - Instruct patient to call for assistance with activity based on assessment  - Modify environment to reduce risk of injury  - Consider OT/PT consult to assist with strengthening/mobility  7/6/2019 2313 by Filemon Richardson RN  Outcome: Progressing  7/6/2019 2311 by Filemon Richardson RN  Outcome: Progressing  Goal: Maintain or return to baseline ADL function  Description  INTERVENTIONS:  -  Assess patient's ability to carry out ADLs; assess patient's baseline for ADL function and identify physical deficits which impact ability to perform ADLs (bathing, care of mouth/teeth, toileting, grooming, dressing, etc )  - Assess/evaluate cause of self-care deficits   - Assess range of motion  - Assess patient's mobility; develop plan if impaired  - Assess patient's need for assistive devices and provide as appropriate  - Encourage maximum independence but intervene and supervise when necessary  ¯ Involve family in performance of ADLs  ¯ Assess for home care needs following discharge   ¯ Request OT consult to assist with ADL evaluation and planning for discharge  ¯ Provide patient education as appropriate  7/6/2019 2313 by Filemon Richardson RN  Outcome: Progressing  7/6/2019 2311 by Filemon Richardson RN  Outcome: Progressing  Goal: Maintain or return mobility status to optimal level  Description  INTERVENTIONS:  - Assess patient's baseline mobility status (ambulation, transfers, stairs, etc )    - Identify cognitive and physical deficits and behaviors that affect mobility  - Identify mobility aids required to assist with transfers and/or ambulation (gait belt, sit-to-stand, lift, walker, cane, etc )  - [place] fall precautions as indicated by assessment  - Record patient progress and toleration of activity level on Mobility SBAR; progress patient to next Phase/Stage  - Instruct patient to call for assistance with activity based on assessment  - Request Rehabilitation consult to assist with strengthening/weightbearing, etc   7/6/2019 2313 by Ronald Latif RN  Outcome: Progressing  7/6/2019 2311 by Ronald Latif RN  Outcome: Progressing     Problem: DISCHARGE PLANNING  Goal: Discharge to home or other facility with appropriate resources  Description  INTERVENTIONS:  - Identify barriers to discharge w/patient and caregiver  - Arrange for needed discharge resources and transportation as appropriate  - Identify discharge learning needs (meds, wound care, etc )  - Arrange for interpretive services to assist at discharge as needed  - Refer to Case Management Department for coordinating discharge planning if the patient needs post-hospital services based on physician/advanced practitioner order or complex needs related to functional status, cognitive ability, or social support system  7/6/2019 2313 by Ronald Latif RN  Outcome: Progressing  7/6/2019 2311 by Ronald Latif RN  Outcome: Progressing

## 2019-07-07 NOTE — ASSESSMENT & PLAN NOTE
Pt is sp fall   Consult ortho although imaging stable   Xray right ankle: No acute osseous abnormality    Pt with increased right ankle pain ice and elevate

## 2019-07-09 ENCOUNTER — TRANSITIONAL CARE MANAGEMENT (OUTPATIENT)
Dept: FAMILY MEDICINE CLINIC | Facility: CLINIC | Age: 59
End: 2019-07-09

## 2019-08-12 ENCOUNTER — TELEPHONE (OUTPATIENT)
Dept: FAMILY MEDICINE CLINIC | Facility: CLINIC | Age: 59
End: 2019-08-12

## 2019-08-12 DIAGNOSIS — G35 MULTIPLE SCLEROSIS, RELAPSING-REMITTING (HCC): Primary | ICD-10-CM

## 2019-08-12 DIAGNOSIS — G43.909 MIGRAINE WITHOUT STATUS MIGRAINOSUS, NOT INTRACTABLE, UNSPECIFIED MIGRAINE TYPE: ICD-10-CM

## 2019-08-21 DIAGNOSIS — G35 MULTIPLE SCLEROSIS, RELAPSING-REMITTING (HCC): ICD-10-CM

## 2019-08-21 DIAGNOSIS — M79.2 NEUROPATHIC PAIN: ICD-10-CM

## 2019-08-21 RX ORDER — GABAPENTIN 100 MG/1
CAPSULE ORAL
Qty: 90 CAPSULE | Refills: 5 | Status: SHIPPED | OUTPATIENT
Start: 2019-08-21 | End: 2020-10-02 | Stop reason: SDUPTHER

## 2019-10-10 ENCOUNTER — TELEPHONE (OUTPATIENT)
Dept: NEUROLOGY | Facility: CLINIC | Age: 59
End: 2019-10-10

## 2019-10-10 NOTE — TELEPHONE ENCOUNTER
Solange Graham from [facility] speciality pharmacy regarding  Copaxone 40mg  Last time filled was 7/10/19  This makes it 3 months with missed therapy  Multiple attempts made by pharmacy to contact patient by same contact number unsuccessful  Just wanted to make us aware

## 2019-10-18 ENCOUNTER — TELEPHONE (OUTPATIENT)
Dept: NEUROLOGY | Facility: CLINIC | Age: 59
End: 2019-10-18

## 2019-12-16 ENCOUNTER — TELEPHONE (OUTPATIENT)
Dept: NEUROLOGY | Facility: CLINIC | Age: 59
End: 2019-12-16

## 2019-12-16 NOTE — TELEPHONE ENCOUNTER
Received Fax from [address] was already scanned into chart on 12/12 by External  Could not find a note if it was already send to [facility]  Send document to O on 12/16

## 2020-01-21 ENCOUNTER — TELEPHONE (OUTPATIENT)
Dept: FAMILY MEDICINE CLINIC | Facility: CLINIC | Age: 60
End: 2020-01-21

## 2020-06-16 ENCOUNTER — TELEPHONE (OUTPATIENT)
Dept: NEUROLOGY | Facility: CLINIC | Age: 60
End: 2020-06-16

## 2020-06-23 ENCOUNTER — TELEPHONE (OUTPATIENT)
Dept: NEUROLOGY | Facility: CLINIC | Age: 60
End: 2020-06-23

## 2020-06-29 ENCOUNTER — HOSPITAL ENCOUNTER (EMERGENCY)
Facility: HOSPITAL | Age: 60
Discharge: HOME | End: 2020-06-29
Attending: EMERGENCY MEDICINE
Payer: COMMERCIAL

## 2020-06-29 ENCOUNTER — APPOINTMENT (EMERGENCY)
Dept: RADIOLOGY | Facility: HOSPITAL | Age: 60
End: 2020-06-29
Payer: COMMERCIAL

## 2020-06-29 VITALS
DIASTOLIC BLOOD PRESSURE: 79 MMHG | HEART RATE: 76 BPM | OXYGEN SATURATION: 100 % | TEMPERATURE: 98 F | RESPIRATION RATE: 18 BRPM | SYSTOLIC BLOOD PRESSURE: 138 MMHG

## 2020-06-29 DIAGNOSIS — R06.00 DYSPNEA: Primary | ICD-10-CM

## 2020-06-29 LAB
ALBUMIN SERPL BCP-MCNC: 3.4 G/DL (ref 3.5–5)
ALP SERPL-CCNC: 82 U/L (ref 46–116)
ALT SERPL W P-5'-P-CCNC: 84 U/L (ref 12–78)
ANION GAP SERPL CALCULATED.3IONS-SCNC: 7 MMOL/L (ref 4–13)
AST SERPL W P-5'-P-CCNC: 37 U/L (ref 5–45)
BASOPHILS # BLD AUTO: 0.07 THOUSANDS/ΜL (ref 0–0.1)
BASOPHILS NFR BLD AUTO: 1 % (ref 0–1)
BILIRUB SERPL-MCNC: 0.48 MG/DL (ref 0.2–1)
BUN SERPL-MCNC: 15 MG/DL (ref 5–25)
CALCIUM SERPL-MCNC: 8.9 MG/DL (ref 8.3–10.1)
CHLORIDE SERPL-SCNC: 103 MMOL/L (ref 100–108)
CO2 SERPL-SCNC: 28 MMOL/L (ref 21–32)
CREAT SERPL-MCNC: 0.79 MG/DL (ref 0.6–1.3)
EOSINOPHIL # BLD AUTO: 0.26 THOUSAND/ΜL (ref 0–0.61)
EOSINOPHIL NFR BLD AUTO: 3 % (ref 0–6)
ERYTHROCYTE [DISTWIDTH] IN BLOOD BY AUTOMATED COUNT: 13.3 % (ref 11.6–15.1)
GFR SERPL CREATININE-BSD FRML MDRD: 83 ML/MIN/1.73SQ M
GLUCOSE SERPL-MCNC: 96 MG/DL (ref 65–140)
HCT VFR BLD AUTO: 45.4 % (ref 34.8–46.1)
HGB BLD-MCNC: 14.2 G/DL (ref 11.5–15.4)
IMM GRANULOCYTES # BLD AUTO: 0.04 THOUSAND/UL (ref 0–0.2)
IMM GRANULOCYTES NFR BLD AUTO: 0 % (ref 0–2)
LYMPHOCYTES # BLD AUTO: 1.62 THOUSANDS/ΜL (ref 0.6–4.47)
LYMPHOCYTES NFR BLD AUTO: 16 % (ref 14–44)
MCH RBC QN AUTO: 28.5 PG (ref 26.8–34.3)
MCHC RBC AUTO-ENTMCNC: 31.3 G/DL (ref 31.4–37.4)
MCV RBC AUTO: 91 FL (ref 82–98)
MONOCYTES # BLD AUTO: 0.75 THOUSAND/ΜL (ref 0.17–1.22)
MONOCYTES NFR BLD AUTO: 8 % (ref 4–12)
NEUTROPHILS # BLD AUTO: 7.24 THOUSANDS/ΜL (ref 1.85–7.62)
NEUTS SEG NFR BLD AUTO: 72 % (ref 43–75)
NRBC BLD AUTO-RTO: 0 /100 WBCS
PLATELET # BLD AUTO: 293 THOUSANDS/UL (ref 149–390)
PMV BLD AUTO: 10 FL (ref 8.9–12.7)
POTASSIUM SERPL-SCNC: 3.8 MMOL/L (ref 3.5–5.3)
PROT SERPL-MCNC: 7.8 G/DL (ref 6.4–8.2)
RBC # BLD AUTO: 4.98 MILLION/UL (ref 3.81–5.12)
SODIUM SERPL-SCNC: 138 MMOL/L (ref 136–145)
TROPONIN I SERPL-MCNC: <0.02 NG/ML
WBC # BLD AUTO: 9.98 THOUSAND/UL (ref 4.31–10.16)

## 2020-06-29 PROCEDURE — 96374 THER/PROPH/DIAG INJ IV PUSH: CPT

## 2020-06-29 PROCEDURE — 99285 EMERGENCY DEPT VISIT HI MDM: CPT | Performed by: EMERGENCY MEDICINE

## 2020-06-29 PROCEDURE — 84484 ASSAY OF TROPONIN QUANT: CPT | Performed by: EMERGENCY MEDICINE

## 2020-06-29 PROCEDURE — 93005 ELECTROCARDIOGRAM TRACING: CPT

## 2020-06-29 PROCEDURE — 99284 EMERGENCY DEPT VISIT MOD MDM: CPT

## 2020-06-29 PROCEDURE — 85025 COMPLETE CBC W/AUTO DIFF WBC: CPT | Performed by: EMERGENCY MEDICINE

## 2020-06-29 PROCEDURE — 71045 X-RAY EXAM CHEST 1 VIEW: CPT

## 2020-06-29 PROCEDURE — 80053 COMPREHEN METABOLIC PANEL: CPT | Performed by: EMERGENCY MEDICINE

## 2020-06-29 PROCEDURE — 36415 COLL VENOUS BLD VENIPUNCTURE: CPT | Performed by: EMERGENCY MEDICINE

## 2020-06-29 RX ORDER — ONDANSETRON 2 MG/ML
4 INJECTION INTRAMUSCULAR; INTRAVENOUS ONCE
Status: COMPLETED | OUTPATIENT
Start: 2020-06-29 | End: 2020-06-29

## 2020-06-29 RX ADMIN — ONDANSETRON 4 MG: 2 INJECTION INTRAMUSCULAR; INTRAVENOUS at 17:34

## 2020-06-30 LAB
ATRIAL RATE: 87 BPM
P AXIS: -47 DEGREES
PR INTERVAL: 128 MS
QRS AXIS: 1 DEGREES
QRSD INTERVAL: 74 MS
QT INTERVAL: 344 MS
QTC INTERVAL: 413 MS
T WAVE AXIS: 35 DEGREES
VENTRICULAR RATE: 87 BPM

## 2020-06-30 PROCEDURE — 93010 ELECTROCARDIOGRAM REPORT: CPT | Performed by: INTERNAL MEDICINE

## 2020-07-22 ENCOUNTER — TELEPHONE (OUTPATIENT)
Dept: FAMILY MEDICINE CLINIC | Facility: CLINIC | Age: 60
End: 2020-07-22

## 2020-07-22 DIAGNOSIS — G35 MULTIPLE SCLEROSIS (HCC): Primary | ICD-10-CM

## 2020-07-22 NOTE — TELEPHONE ENCOUNTER
Gris Abad called from Neuro patient needs amb referral for neuro patient is scheduled 7/29/2020   Dx codes G28 , S40 1

## 2020-09-20 NOTE — ED ATTENDING ATTESTATION
Rod Brar MD, saw and evaluated the patient  All available labs and X-rays were ordered by me or the resident and have been reviewed by myself  I discussed the patient with the resident / non-physician and agree with the resident's / non-physician practitioner's findings and plan as documented in the resident's / non-physician practicitioner's note, except where noted  At this point, I agree with the current assessment done in the ED  Chief Complaint   Patient presents with    Shortness of Breath     Pt states diagnosed with pneumonia on 31st of Jan  Pt states not improving, went to see family physician and sent here for further evaluation     This is a 56-year-old female with past medical history significant for COPD and multiple sclerosis presenting for evaluation of dyspnea  The patient states for the past 2 weeks she has been having coughing, shortness of breath, feeling she can't do her normal activities  She has she was diagnosed with a pneumonia after a negative cardiac workup on January 31st with a lingular pneumonia  The patient was started on azithromycin and steroids  Despite this she is not having any improvement  She ran out of her inhaler  She has she follow up with her family doctor today for evaluation because of continued symptoms of shortness of breath and coughing  She feels that her symptoms are actually getting worse than they were before  She was not hypoxic in the office, she has no true exertional dyspnea where she is unable to do her normal activities  It kind of feels like a previous COPD exacerbation however is not improving which is why she wants to make sure nothing else is going on  When she used her inhaler she would actually have improvement for about 20 minutes of her symptoms but then they would return  No lower extremity edema that is new  No other changes in medications    Denies any urinary tract infection symptoms (burning, itching, pain, blood, frequency)  No body aches  No flu shot / pneumonia shot  PMH:  - MS x8-9 years on copaxone (glatiramer) --> immunomodulator medication  - Recent lingular pna  - COPD  PSH:  - none  Former smoker  No alcohol  No drugs  No recent travel  No sick contacts with similar  FH:  - mom + dad were severe COPD patients on oxygen, and  of COPD  PE:  Vitals:    18 1315 18 1336 18 1400 18 1526   BP: 125/65  130/71 133/59   BP Location:       Pulse: 68  82 97   Resp: 18  (!) 26 (!) 26   Temp:       TempSrc:       SpO2: 96% 96% 96% 93%   Weight:       Height:       General: VSS, NAD, awake, alert  Well-nourished, well-developed  Appears stated age  Head: Normocephalic, atraumatic, nontender  Eyes: PERRL, EOM-I  No diplopia  No hyphema  No subconjunctival hemorrhages  Symmetrical lids  ENT: Atraumatic external nose and ears  Pharynx normal    MMM  No malocclusion  No stridor  Normal phonation  No drooling  Normal swallowing  Base of mouth is soft  No mastoid tenderness  Neck: Symmetric, trachea midline  No JVD  No midline neck tenderness  CV: RRR  +S1/S2  No murmurs or gallops  Peripheral pulses +2 throughout  No chest wall tenderness  Lungs:   Frequent coughing  Unlabored No retractions  Diffuse wheezing  lungs sounds equal bilateral    No crepitus  No tachypnea  No paradoxical motion  Abd: +BS, soft, NT/ND    MSK:   FROM   No lower extremity edema  Back: No CVAT  Skin: Dry, intact  Neuro: AAOx3, GCS 15, CN II-XII grossly intact  Motor grossly intact  Psychiatric/Behavioral: Appropriate mood and affect   Exam: deferred  A:  - Dyspnea  - Wheezing  P:  - this is  an immunosuppressed female coming in for possibly worsening pneumonia  - will do basic labs  Her story as she sounds much more like COPD especially given her lung exam with diffuse wheezing versus bronchitis    - will do a 1 hour breathing treatment, re-evaluate  - I do not think that she needs a CT scan at this point  Would consider changing her antibiotic therapy to doxycycline if the chest x-ray is worse  - 13 point ROS was performed and all are normal unless stated in the history above  - Nursing note reviewed  Vitals reviewed  - Orders placed by myself and/or advanced practitioner / resident     - Previous chart was reviewed  - No language barrier    - History obtained from patient  - There are no limitations to the history obtained  - Critical care time: Not applicable for this patient  Final Diagnosis:  1  COPD (chronic obstructive pulmonary disease) ([facility] )    2  Viral syndrome    3  Community acquired pneumonia of left upper lobe of lung ([facility] )    4  Moderate persistent asthma with acute exacerbation      ED Course as of Feb 12 1530   Fri Feb 09, 2018   1431 Subtherapeutic --> likely leading to her frequent asthma exacerbations  THEOPHYLLINE LEVEL: (!) 3 5   1523 This EKG was interpreted by me  The EKG demonstrates Normal sinus rhythm, normal intervals and axis, normal QRS, non specific acute ST-T changes  HR 77   Similar to 31-JAN-2018 18:32,    1524 Re-eval; refill meds; close f/u with PCP  The patient is less than 50, has an initial HR < 100, O2 Saturation >95%, no hx of previous VTE, no recent trauma or surgery within 4 weeks, no hemoptylsis, and is not on any exogenous estrogen  The patient has no need for further workup of PE and has  <2% chance of PE  My initial pre-test probability of PE is <15% and PERC Rule criteria are satisfied    PE risk, Wells score = [0]   (0) clinically suspected DVT - 3 points   (0) alternative diagnosis is less likely than PE - 3 0 points   (0) tachycardia - 1 5 points   (0) immobilization/surgery in previous four weeks - 1 5 points   (0) history of DVT or PE - 1 5 points   (0) hemoptysis - 1 0 points   (0) malignancy (treatment for within 6 months, palliative) - 1 0 points   Interpretation Alyssa Richelleirie et al  2007 Radiology [id number]):   Score <2 0 - Low (probability 15% based on pooled data)         Medications   ibuprofen (MOTRIN) tablet 400 mg (400 mg Oral Given 2/9/18 1133)   ipratropium-albuterol (DUO-NEB) 0 5-2 5 mg/3 mL inhalation solution 3 mL (3 mL Nebulization Given 2/9/18 1318)   sodium chloride 0 9 % bolus 1,000 mL (0 mL Intravenous Stopped 2/9/18 1708)   methylPREDNISolone sodium succinate (Solu-MEDROL) injection 125 mg (125 mg Intravenous Given 2/9/18 1343)   magnesium sulfate 2 g/50 mL IVPB (premix) 2 g (0 g Intravenous Stopped 2/9/18 1708)   albuterol inhalation solution 10 mg (10 mg Nebulization Given 2/9/18 1330)   ipratropium (ATROVENT) 0 02 % inhalation solution 1 mg (1 mg Nebulization Given 2/9/18 1330)   sodium chloride 0 9 % inhalation solution 3 mL (12 mL Nebulization Given 2/9/18 1330)     XR chest 2 views   ED Interpretation   Abnormal   The CXR was ordered by resident and interpreted by me independently  On my read, it appears:   - peribronchial cuffing      Final Result      No active pulmonary disease        Workstation performed: [id number]           Orders Placed This Encounter   Procedures    XR chest 2 views    CBC and differential    Basic metabolic panel    Theophylline level    EKG RESULTS    ECG 12 lead    ECG 12 lead     Labs Reviewed   THEOPHYLLINE LEVEL - Abnormal        Result Value Ref Range Status    Theophylline Lvl 3 5 (*) 10 0 - 20 0 ug/mL Final   CBC AND DIFFERENTIAL - Normal    WBC 8 31  4 31 - 10 16 Thousand/uL Final    RBC 4 56  3 81 - 5 12 Million/uL Final    Hemoglobin 12 9  11 5 - 15 4 g/dL Final    Hematocrit 39 5  34 8 - 46 1 % Final    MCV 87  82 - 98 fL Final    MCH 28 3  26 8 - 34 3 pg Final    MCHC 32 7  31 4 - 37 4 g/dL Final    RDW 12 9  11 6 - 15 1 % Final    MPV 9 7  8 9 - 12 7 fL Final    Platelets 820  154 - 390 Thousands/uL Final    nRBC 0  /100 WBCs Final    Neutrophils Relative 62  43 - 75 % Final    Lymphocytes Relative 27  14 - 44 % Final    Monocytes Relative 6  4 - 12 % Final    Eosinophils Relative 5  0 - 6 % Final    Basophils Relative 0  0 - 1 % Final    Neutrophils Absolute 5 21  1 85 - 7 62 Thousands/µL Final    Lymphocytes Absolute 2 20  0 60 - 4 47 Thousands/µL Final    Monocytes Absolute 0 46  0 17 - 1 22 Thousand/µL Final    Eosinophils Absolute 0 41  0 00 - 0 61 Thousand/µL Final    Basophils Absolute 0 01  0 00 - 0 10 Thousands/µL Final   BASIC METABOLIC PANEL    Sodium 600  136 - 145 mmol/L Final    Potassium 3 6  3 5 - 5 3 mmol/L Final    Chloride 106  100 - 108 mmol/L Final    CO2 30  21 - 32 mmol/L Final    Anion Gap 6  4 - 13 mmol/L Final    BUN 6  5 - 25 mg/dL Final    Creatinine 0 72  0 60 - 1 30 mg/dL Final    Comment: Standardized to IDMS reference method    Glucose 130  65 - 140 mg/dL Final    Comment:   If the patient is fasting, the ADA then defines impaired fasting glucose as > 100 mg/dL and diabetes as > or equal to 123 mg/dL  Specimen collection should occur prior to Sulfasalazine administration due to the potential for falsely depressed results  Specimen collection should occur prior to Sulfapyridine administration due to the potential for falsely elevated results  Calcium 8 9  8 3 - 10 1 mg/dL Final    eGFR 95  ml/min/1 73sq m Final    Narrative:     National Kidney Disease Education Program recommendations are as follows:  GFR calculation is accurate only with a steady state creatinine  Chronic Kidney disease less than 60 ml/min/1 73 sq  meters  Kidney failure less than 15 ml/min/1 73 sq  meters       Time reflects when diagnosis was documented in both MDM as applicable and the Disposition within this note     Time User Action Codes Description Comment    2/9/2018  3:27 PM Dayan Garcia Add [J44 9] COPD [facility] )     2/9/2018  3:27 PM Sarian Traylor [B34 9] Viral syndrome     2/9/2018  3:28 PM Sarina Traylor [J18 1] Community acquired pneumonia of left upper lobe of lung ([facility] [address] [address] )     2/9/2018  3:28 PM Schuyler Traylor [J45 41] Moderate persistent asthma with acute exacerbation       ED Disposition     ED Disposition Condition Comment    Discharge  Zaheer Francis discharge to home/self care  Condition at discharge: Good        Follow-up Information     Follow up With Specialties Details Why Contact Info    Cal Alfonso, [address] [facility]  As needed, If symptoms worsen [address]  [address] [facility]  [phone]          Discharge Medication List as of 2/9/2018  3:29 PM      CONTINUE these medications which have CHANGED    Details   albuterol (ACCUNEB) 1 25 MG/3ML nebulizer solution Take 3 mL (1 25 mg total) by nebulization every 6 (six) hours as needed for wheezing, Starting Fri 2/9/2018, Normal      theophylline (VIKA-24) 100 MG 24 hr capsule Take 1 capsule (100 mg total) by mouth daily, Starting Fri 2/9/2018, Normal         CONTINUE these medications which have NOT CHANGED    Details   budesonide-formoterol (SYMBICORT) 160-4 5 mcg/act inhaler Inhale 2 puffs 2 (two) times a day, Starting Fri 2/9/2018, Sample      Glatiramer Acetate (COPAXONE) 40 MG/ML SOSY Inject under the skin 3 (three) times a week , Historical Med      SUMAtriptan (IMITREX) 100 mg tablet Take 1 tablet by mouth, Starting Tue 11/1/2011, Historical Med      traZODone (DESYREL) 100 mg tablet 200 mg daily at bedtime, Starting Sun 1/28/2018, Historical Med           No discharge procedures on file  Prior to Admission Medications   Prescriptions Last Dose Informant Patient Reported? Taking? Glatiramer Acetate (COPAXONE) 40 MG/ML SOSY Past Month at Unknown time Self Yes Yes   Sig: Inject under the skin 3 (three) times a week     SUMAtriptan (IMITREX) 100 mg tablet  Self Yes Yes   Sig: Take 1 tablet by mouth   albuterol (ACCUNEB) 1 25 MG/3ML nebulizer solution   No Yes   Sig: Take 3 mL (1 25 mg total) by nebulization every 6 (six) hours as needed for wheezing   budesonide-formoterol (SYMBICORT) 160-4 5 mcg/act inhaler 2/9/2018 at Unknown time  No Yes   Sig: Inhale 2 puffs 2 (two) times a day   theophylline (VIKA-24) 100 MG 24 hr capsule 2018 at Unknown time Self Yes Yes   Sig: Take 100 mg by mouth daily  traZODone (DESYREL) 100 mg tablet 2018 at Unknown time Self Yes Yes   Si mg daily at bedtime      Facility-Administered Medications Last Administration Doses Remaining   albuterol (PROVENTIL HFA,VENTOLIN HFA) inhaler 2 puff None recorded           Portions of the record may have been created with voice recognition software  Occasional wrong word or "sound a like" substitutions may have occurred due to the inherent limitations of voice recognition software  Read the chart carefully and recognize, using context, where substitutions have occurred      Electronically signed by:  Ru Diane Principal Discharge DX:	Facial trauma, initial encounter  Secondary Diagnosis:	Brachial plexus injury, right, initial encounter

## 2020-10-02 ENCOUNTER — OFFICE VISIT (OUTPATIENT)
Dept: FAMILY MEDICINE CLINIC | Facility: CLINIC | Age: 60
End: 2020-10-02
Payer: COMMERCIAL

## 2020-10-02 VITALS
HEART RATE: 80 BPM | SYSTOLIC BLOOD PRESSURE: 110 MMHG | WEIGHT: 182 LBS | RESPIRATION RATE: 17 BRPM | DIASTOLIC BLOOD PRESSURE: 68 MMHG | HEIGHT: 61 IN | OXYGEN SATURATION: 98 % | BODY MASS INDEX: 34.36 KG/M2 | TEMPERATURE: 68.3 F

## 2020-10-02 DIAGNOSIS — F32.A ANXIETY AND DEPRESSION: ICD-10-CM

## 2020-10-02 DIAGNOSIS — Z13.220 SCREENING FOR HYPERLIPIDEMIA: ICD-10-CM

## 2020-10-02 DIAGNOSIS — G35 MULTIPLE SCLEROSIS, RELAPSING-REMITTING (HCC): ICD-10-CM

## 2020-10-02 DIAGNOSIS — F41.9 ANXIETY AND DEPRESSION: ICD-10-CM

## 2020-10-02 DIAGNOSIS — F31.9 BIPOLAR 1 DISORDER (HCC): ICD-10-CM

## 2020-10-02 DIAGNOSIS — G43.909 MIGRAINE WITHOUT STATUS MIGRAINOSUS, NOT INTRACTABLE, UNSPECIFIED MIGRAINE TYPE: ICD-10-CM

## 2020-10-02 DIAGNOSIS — M79.2 NEUROPATHIC PAIN: ICD-10-CM

## 2020-10-02 DIAGNOSIS — J44.9 ASTHMA WITH COPD (HCC): ICD-10-CM

## 2020-10-02 DIAGNOSIS — Z13.1 SCREENING FOR DIABETES MELLITUS (DM): ICD-10-CM

## 2020-10-02 DIAGNOSIS — G89.4 CHRONIC PAIN SYNDROME: ICD-10-CM

## 2020-10-02 DIAGNOSIS — Z23 ENCOUNTER FOR VACCINATION: ICD-10-CM

## 2020-10-02 DIAGNOSIS — Z12.39 ENCOUNTER FOR SCREENING FOR MALIGNANT NEOPLASM OF BREAST, UNSPECIFIED SCREENING MODALITY: Primary | ICD-10-CM

## 2020-10-02 PROCEDURE — 3725F SCREEN DEPRESSION PERFORMED: CPT | Performed by: FAMILY MEDICINE

## 2020-10-02 PROCEDURE — 90472 IMMUNIZATION ADMIN EACH ADD: CPT

## 2020-10-02 PROCEDURE — 90471 IMMUNIZATION ADMIN: CPT

## 2020-10-02 PROCEDURE — 90732 PPSV23 VACC 2 YRS+ SUBQ/IM: CPT

## 2020-10-02 PROCEDURE — 90682 RIV4 VACC RECOMBINANT DNA IM: CPT

## 2020-10-02 PROCEDURE — 4004F PT TOBACCO SCREEN RCVD TLK: CPT | Performed by: FAMILY MEDICINE

## 2020-10-02 PROCEDURE — 99214 OFFICE O/P EST MOD 30 MIN: CPT | Performed by: FAMILY MEDICINE

## 2020-10-02 RX ORDER — ALBUTEROL SULFATE 2.5 MG/3ML
2.5 SOLUTION RESPIRATORY (INHALATION) EVERY 4 HOURS PRN
Qty: 25 VIAL | Refills: 5 | Status: SHIPPED | OUTPATIENT
Start: 2020-10-02

## 2020-10-02 RX ORDER — SUMATRIPTAN 100 MG/1
100 TABLET, FILM COATED ORAL ONCE AS NEEDED
Qty: 10 TABLET | Refills: 5 | Status: SHIPPED | OUTPATIENT
Start: 2020-10-02 | End: 2020-10-02 | Stop reason: SDUPTHER

## 2020-10-02 RX ORDER — LORATADINE 10 MG/1
10 TABLET ORAL DAILY
Qty: 90 TABLET | Refills: 1 | Status: SHIPPED | OUTPATIENT
Start: 2020-10-02 | End: 2021-10-20

## 2020-10-02 RX ORDER — FLUOXETINE 10 MG/1
10 CAPSULE ORAL DAILY
Qty: 90 CAPSULE | Refills: 1 | Status: SHIPPED | OUTPATIENT
Start: 2020-10-02 | End: 2021-04-02

## 2020-10-02 RX ORDER — GABAPENTIN 300 MG/1
CAPSULE ORAL
Qty: 90 CAPSULE | Refills: 1 | Status: SHIPPED | OUTPATIENT
Start: 2020-10-02 | End: 2020-12-20

## 2020-10-02 RX ORDER — SUMATRIPTAN 100 MG/1
100 TABLET, FILM COATED ORAL ONCE AS NEEDED
Qty: 30 TABLET | Refills: 1 | Status: SHIPPED | OUTPATIENT
Start: 2020-10-02 | End: 2021-08-06

## 2020-10-02 RX ORDER — FLUOXETINE 10 MG/1
10 CAPSULE ORAL DAILY
Qty: 30 CAPSULE | Refills: 5 | Status: SHIPPED | OUTPATIENT
Start: 2020-10-02 | End: 2020-10-02 | Stop reason: SDUPTHER

## 2020-10-02 RX ORDER — LORATADINE 10 MG/1
10 TABLET ORAL DAILY
Qty: 30 TABLET | Refills: 5 | Status: SHIPPED | OUTPATIENT
Start: 2020-10-02 | End: 2020-10-02 | Stop reason: SDUPTHER

## 2020-10-06 ENCOUNTER — APPOINTMENT (OUTPATIENT)
Dept: LAB | Facility: HOSPITAL | Age: 60
End: 2020-10-06
Payer: COMMERCIAL

## 2020-10-06 DIAGNOSIS — Z13.1 SCREENING FOR DIABETES MELLITUS (DM): ICD-10-CM

## 2020-10-06 DIAGNOSIS — Z13.220 SCREENING FOR HYPERLIPIDEMIA: ICD-10-CM

## 2020-10-06 LAB
ALBUMIN SERPL BCP-MCNC: 3.6 G/DL (ref 3.5–5)
ALP SERPL-CCNC: 70 U/L (ref 46–116)
ALT SERPL W P-5'-P-CCNC: 31 U/L (ref 12–78)
ANION GAP SERPL CALCULATED.3IONS-SCNC: 3 MMOL/L (ref 4–13)
AST SERPL W P-5'-P-CCNC: 16 U/L (ref 5–45)
BILIRUB SERPL-MCNC: 0.87 MG/DL (ref 0.2–1)
BUN SERPL-MCNC: 13 MG/DL (ref 5–25)
CALCIUM SERPL-MCNC: 9.6 MG/DL (ref 8.3–10.1)
CHLORIDE SERPL-SCNC: 107 MMOL/L (ref 100–108)
CHOLEST SERPL-MCNC: 229 MG/DL (ref 50–200)
CO2 SERPL-SCNC: 29 MMOL/L (ref 21–32)
CREAT SERPL-MCNC: 0.76 MG/DL (ref 0.6–1.3)
GFR SERPL CREATININE-BSD FRML MDRD: 87 ML/MIN/1.73SQ M
GLUCOSE P FAST SERPL-MCNC: 85 MG/DL (ref 65–99)
HDLC SERPL-MCNC: 66 MG/DL
LDLC SERPL CALC-MCNC: 129 MG/DL (ref 0–100)
NONHDLC SERPL-MCNC: 163 MG/DL
POTASSIUM SERPL-SCNC: 4.2 MMOL/L (ref 3.5–5.3)
PROT SERPL-MCNC: 7.4 G/DL (ref 6.4–8.2)
SODIUM SERPL-SCNC: 139 MMOL/L (ref 136–145)
TRIGL SERPL-MCNC: 172 MG/DL

## 2020-10-06 PROCEDURE — 36415 COLL VENOUS BLD VENIPUNCTURE: CPT

## 2020-10-06 PROCEDURE — 80053 COMPREHEN METABOLIC PANEL: CPT

## 2020-10-06 PROCEDURE — 80061 LIPID PANEL: CPT

## 2020-10-24 DIAGNOSIS — J44.9 ASTHMA WITH COPD (HCC): ICD-10-CM

## 2020-11-17 ENCOUNTER — TELEPHONE (OUTPATIENT)
Dept: FAMILY MEDICINE CLINIC | Facility: CLINIC | Age: 60
End: 2020-11-17

## 2020-11-18 DIAGNOSIS — Z20.822 EXPOSURE TO COVID-19 VIRUS: Primary | ICD-10-CM

## 2020-11-20 DIAGNOSIS — Z20.822 EXPOSURE TO COVID-19 VIRUS: ICD-10-CM

## 2020-11-20 PROCEDURE — U0003 INFECTIOUS AGENT DETECTION BY NUCLEIC ACID (DNA OR RNA); SEVERE ACUTE RESPIRATORY SYNDROME CORONAVIRUS 2 (SARS-COV-2) (CORONAVIRUS DISEASE [COVID-19]), AMPLIFIED PROBE TECHNIQUE, MAKING USE OF HIGH THROUGHPUT TECHNOLOGIES AS DESCRIBED BY CMS-2020-01-R: HCPCS | Performed by: FAMILY MEDICINE

## 2020-11-22 LAB — SARS-COV-2 RNA SPEC QL NAA+PROBE: NOT DETECTED

## 2020-12-09 ENCOUNTER — OFFICE VISIT (OUTPATIENT)
Dept: FAMILY MEDICINE CLINIC | Facility: CLINIC | Age: 60
End: 2020-12-09
Payer: COMMERCIAL

## 2020-12-09 VITALS — BODY MASS INDEX: 30.58 KG/M2 | WEIGHT: 162 LBS | HEIGHT: 61 IN

## 2020-12-09 DIAGNOSIS — J44.1 COPD EXACERBATION (HCC): ICD-10-CM

## 2020-12-09 DIAGNOSIS — B34.9 VIRAL INFECTION, UNSPECIFIED: ICD-10-CM

## 2020-12-09 DIAGNOSIS — B34.9 VIRAL INFECTION, UNSPECIFIED: Primary | ICD-10-CM

## 2020-12-09 PROCEDURE — U0003 INFECTIOUS AGENT DETECTION BY NUCLEIC ACID (DNA OR RNA); SEVERE ACUTE RESPIRATORY SYNDROME CORONAVIRUS 2 (SARS-COV-2) (CORONAVIRUS DISEASE [COVID-19]), AMPLIFIED PROBE TECHNIQUE, MAKING USE OF HIGH THROUGHPUT TECHNOLOGIES AS DESCRIBED BY CMS-2020-01-R: HCPCS | Performed by: FAMILY MEDICINE

## 2020-12-09 PROCEDURE — G2012 BRIEF CHECK IN BY MD/QHP: HCPCS | Performed by: FAMILY MEDICINE

## 2020-12-09 RX ORDER — CLARITHROMYCIN 500 MG/1
500 TABLET, COATED ORAL EVERY 12 HOURS SCHEDULED
Qty: 10 TABLET | Refills: 0 | Status: SHIPPED | OUTPATIENT
Start: 2020-12-09 | End: 2020-12-14

## 2020-12-09 RX ORDER — PREDNISONE 20 MG/1
40 TABLET ORAL DAILY
Qty: 10 TABLET | Refills: 0 | Status: SHIPPED | OUTPATIENT
Start: 2020-12-09 | End: 2020-12-14

## 2020-12-10 LAB — SARS-COV-2 RNA SPEC QL NAA+PROBE: DETECTED

## 2020-12-11 ENCOUNTER — TELEMEDICINE (OUTPATIENT)
Dept: NEUROLOGY | Facility: CLINIC | Age: 60
End: 2020-12-11
Payer: COMMERCIAL

## 2020-12-11 ENCOUNTER — TELEPHONE (OUTPATIENT)
Dept: NEUROLOGY | Facility: CLINIC | Age: 60
End: 2020-12-11

## 2020-12-11 VITALS — BODY MASS INDEX: 30.58 KG/M2 | WEIGHT: 162 LBS | HEIGHT: 61 IN

## 2020-12-11 DIAGNOSIS — G35 MULTIPLE SCLEROSIS (HCC): ICD-10-CM

## 2020-12-11 PROCEDURE — 3008F BODY MASS INDEX DOCD: CPT | Performed by: PSYCHIATRY & NEUROLOGY

## 2020-12-11 PROCEDURE — 4004F PT TOBACCO SCREEN RCVD TLK: CPT | Performed by: PSYCHIATRY & NEUROLOGY

## 2020-12-11 PROCEDURE — 99213 OFFICE O/P EST LOW 20 MIN: CPT | Performed by: PSYCHIATRY & NEUROLOGY

## 2020-12-11 RX ORDER — GLATIRAMER 40 MG/ML
40 INJECTION, SOLUTION SUBCUTANEOUS 3 TIMES WEEKLY
Qty: 12 ML | Refills: 11 | Status: SHIPPED | OUTPATIENT
Start: 2020-12-11 | End: 2021-11-22

## 2020-12-11 RX ORDER — GLATIRAMER 40 MG/ML
40 INJECTION, SOLUTION SUBCUTANEOUS 3 TIMES WEEKLY
Qty: 12 ML | Refills: 11 | OUTPATIENT
Start: 2020-12-11 | End: 2020-12-11 | Stop reason: SDUPTHER

## 2020-12-19 DIAGNOSIS — G35 MULTIPLE SCLEROSIS, RELAPSING-REMITTING (HCC): ICD-10-CM

## 2020-12-19 DIAGNOSIS — M79.2 NEUROPATHIC PAIN: ICD-10-CM

## 2020-12-20 RX ORDER — GABAPENTIN 300 MG/1
CAPSULE ORAL
Qty: 90 CAPSULE | Refills: 1 | Status: SHIPPED | OUTPATIENT
Start: 2020-12-20 | End: 2021-06-08 | Stop reason: SDUPTHER

## 2020-12-23 ENCOUNTER — TELEMEDICINE (OUTPATIENT)
Dept: FAMILY MEDICINE CLINIC | Facility: CLINIC | Age: 60
End: 2020-12-23
Payer: COMMERCIAL

## 2020-12-23 VITALS — HEIGHT: 61 IN | BODY MASS INDEX: 30.4 KG/M2 | WEIGHT: 161 LBS

## 2020-12-23 DIAGNOSIS — U07.1 COVID-19: Primary | ICD-10-CM

## 2020-12-23 PROCEDURE — 4004F PT TOBACCO SCREEN RCVD TLK: CPT | Performed by: NURSE PRACTITIONER

## 2020-12-23 PROCEDURE — 3008F BODY MASS INDEX DOCD: CPT | Performed by: NURSE PRACTITIONER

## 2020-12-23 PROCEDURE — 99212 OFFICE O/P EST SF 10 MIN: CPT | Performed by: NURSE PRACTITIONER

## 2021-02-06 DIAGNOSIS — J44.9 ASTHMA WITH COPD (HCC): ICD-10-CM

## 2021-02-15 ENCOUNTER — TELEPHONE (OUTPATIENT)
Dept: NEUROLOGY | Facility: CLINIC | Age: 61
End: 2021-02-15

## 2021-02-15 NOTE — TELEPHONE ENCOUNTER
Sent text via [employer] for patient to join her 3:30pm appt  About 5 min after sending text and she did not answer it, I called her through Blog[employer]ue and it rang several times and then went to   I left her a message stating I was attempting to start her 3:30pm visit  It is now 3:55pm and she has not answered the text via [employer]Gl[employer] and should be marked as a no show

## 2021-04-02 DIAGNOSIS — F31.9 BIPOLAR 1 DISORDER (HCC): ICD-10-CM

## 2021-04-02 RX ORDER — FLUOXETINE 10 MG/1
CAPSULE ORAL
Qty: 30 CAPSULE | Refills: 5 | Status: SHIPPED | OUTPATIENT
Start: 2021-04-02

## 2021-06-08 ENCOUNTER — TELEPHONE (OUTPATIENT)
Dept: NEUROLOGY | Facility: CLINIC | Age: 61
End: 2021-06-08

## 2021-06-08 DIAGNOSIS — M79.2 NEUROPATHIC PAIN: ICD-10-CM

## 2021-06-08 DIAGNOSIS — G35 MULTIPLE SCLEROSIS, RELAPSING-REMITTING (HCC): ICD-10-CM

## 2021-06-08 RX ORDER — GABAPENTIN 300 MG/1
300 CAPSULE ORAL
Qty: 90 CAPSULE | Refills: 1 | Status: SHIPPED | OUTPATIENT
Start: 2021-06-08 | End: 2021-06-28 | Stop reason: SDUPTHER

## 2021-06-08 NOTE — TELEPHONE ENCOUNTER
Patient called to make follow up appt  Scheduled her for 7/28 with Dr Devora Nissen  LOV 12/2020    Confirmed insurance, PCP and phone number with patient

## 2021-06-23 ENCOUNTER — TELEPHONE (OUTPATIENT)
Dept: NEUROLOGY | Facility: CLINIC | Age: 61
End: 2021-06-23

## 2021-06-23 NOTE — TELEPHONE ENCOUNTER
Called patient, she was agreeable to virtual appointment on Monday at 1 pm      Patient confirms she still has [facility], PCP is Swapna  Updated home address to   [place] Ira[place], [facility] [place]    Also updated e-mail address to: [email or web address]    Patient scheduled  phone#: [phone]  [email or web address]

## 2021-06-23 NOTE — TELEPHONE ENCOUNTER
Patient reports noticing increased numbness and tingling in her b/l hands and b/l feet over the past month  Patient also reports double vision (appx 10xper day/every day)  Patient also experiencing bright white zigzagging lines in her eyes (2-3x/day)  Patient denies any bowel/bladder changes  No UTI symptoms  No increase in fatigue  Patient denies any recent illnesses  No diabetes or psych hx  Patient denies any drug use  Patient states she has not had IV steroids in the past     DMT- Glatiramer Acetate 40mg  No missed doses  MRI - brain and c-spine were ordered in 12/2020  Patient missed appt  I transferred her to  to schedule  LOV - 12/2020  F/U appt - 7/28/21     Dr Tabby Patel - please advise

## 2021-06-23 NOTE — TELEPHONE ENCOUNTER
Patient left voicemail  Need to triage  Will call for more information      Called and Left a message on pt's answering machine for a call back

## 2021-06-28 ENCOUNTER — TELEMEDICINE (OUTPATIENT)
Dept: NEUROLOGY | Facility: CLINIC | Age: 61
End: 2021-06-28
Payer: COMMERCIAL

## 2021-06-28 VITALS — BODY MASS INDEX: 34.17 KG/M2 | WEIGHT: 181 LBS | HEIGHT: 61 IN

## 2021-06-28 DIAGNOSIS — M79.2 NEUROPATHIC PAIN: ICD-10-CM

## 2021-06-28 DIAGNOSIS — F31.9 BIPOLAR 1 DISORDER (HCC): ICD-10-CM

## 2021-06-28 DIAGNOSIS — R26.2 AMBULATORY DYSFUNCTION: ICD-10-CM

## 2021-06-28 DIAGNOSIS — G35 MULTIPLE SCLEROSIS, RELAPSING-REMITTING (HCC): ICD-10-CM

## 2021-06-28 DIAGNOSIS — G35 MULTIPLE SCLEROSIS, RELAPSING-REMITTING (HCC): Primary | ICD-10-CM

## 2021-06-28 DIAGNOSIS — E55.9 VITAMIN D DEFICIENCY: ICD-10-CM

## 2021-06-28 PROCEDURE — 3008F BODY MASS INDEX DOCD: CPT | Performed by: PSYCHIATRY & NEUROLOGY

## 2021-06-28 PROCEDURE — 99215 OFFICE O/P EST HI 40 MIN: CPT | Performed by: PSYCHIATRY & NEUROLOGY

## 2021-06-28 PROCEDURE — 4004F PT TOBACCO SCREEN RCVD TLK: CPT | Performed by: PSYCHIATRY & NEUROLOGY

## 2021-06-28 RX ORDER — GABAPENTIN 300 MG/1
CAPSULE ORAL
Qty: 360 CAPSULE | Refills: 1 | Status: SHIPPED | OUTPATIENT
Start: 2021-06-28 | End: 2021-07-28 | Stop reason: SDUPTHER

## 2021-06-28 RX ORDER — FLUOXETINE 10 MG/1
10 CAPSULE ORAL DAILY
Qty: 90 CAPSULE | Refills: 1 | Status: SHIPPED | OUTPATIENT
Start: 2021-06-28 | End: 2021-06-29

## 2021-06-28 RX ORDER — BACLOFEN 10 MG/1
10 TABLET ORAL
Qty: 30 TABLET | Refills: 0 | Status: SHIPPED | OUTPATIENT
Start: 2021-06-28 | End: 2021-07-22

## 2021-06-28 NOTE — TELEPHONE ENCOUNTER
Spoke to [facility] scheduled [id number] DR Mccullough 6 month mailed avs and xr spine order

## 2021-06-28 NOTE — PROGRESS NOTES
[facility] MULTIPLE SCLEROSIS CENTER  PATIENT:  Carmen Melchor  MRN:  [id number]  :  1960  DATE OF SERVICE:  2021  Virtual Regular Visit      Assessment/Plan:    Problem List Items Addressed This Visit        Nervous and Auditory    Multiple sclerosis, relapsing-remitting ([facility] [address] ) - Primary    Relevant Medications    gabapentin (NEURONTIN) 300 mg capsule    FLUoxetine (PROzac) 10 mg capsule    baclofen 10 mg tablet    Other Relevant Orders    XR spine lumbar minimum 4 views non injury       Other    Vitamin D deficiency    Bipolar 1 disorder (HCC)    Relevant Medications    FLUoxetine (PROzac) 10 mg capsule    Ambulatory dysfunction    Relevant Orders    XR spine lumbar minimum 4 views non injury      Other Visit Diagnoses     Neuropathic pain        Relevant Medications    gabapentin (NEURONTIN) 300 mg capsule    baclofen 10 mg tablet    Other Relevant Orders    XR spine lumbar minimum 4 views non injury               Reason for visit is routine office visit   Chief Complaint   Patient presents with    Virtual Regular Visit        Encounter provider Eric Elaine MD    Provider located at [address] [facility] [address]      Recent Visits  Date Type Provider Dept   21 Telephone Nolan Kunz RN [facility]   Showing recent visits within past 7 days and meeting all other requirements  Today's Visits  Date Type Provider Dept   21 Telemedicine Eric Elaine MD [facility] [place]   Showing today's visits and meeting all other requirements  Future Appointments  No visits were found meeting these conditions  Showing future appointments within next 150 days and meeting all other requirements       The patient was identified by name and date of birth   Carmen Melchor was informed that this is a telemedicine visit and that the visit is being conducted through [address] [place] [address] [facility] [employer] and patient was informed that this is a secure, HIPAA-compliant platform  She agrees to proceed     My office door was closed  No one else was in the room  She acknowledged consent and understanding of privacy and security of the video platform  The patient has agreed to participate and understands they can discontinue the visit at any time  Patient is aware this is a billable service  Subjective  Monica Wilson is a 64 y o  female with MS and related issues  HPI   Ms Amos is 65 yo female who presented to [facility] for follow-up on multiple sclerosis and related issues  Patient has known  of multiple sclerosis, fibromyalgia, urinary frequency, migraine, substance abuse, chronic pain syndrome, Bipolar type I, lumbar spine degenerative changes      Patient reports noticing increased numbness and tingling in her b/l hands and b/l feet over the past month      Patient also reports double vision (appx 10xper day/every day)  Patient also experiencing bright white zigzagging lines in her eyes (2-3x/day)       Patient denies any bowel/bladder changes  No UTI symptoms  No increase in fatigue  Patient denies any recent illnesses  No diabetes or psych hx  Patient denies any drug use  Patient states she has not had IV steroids in the past      DMT- Glatiramer Acetate 40mg  No missed doses  MRI - brain and c-spine were ordered in 12/2020 and scheduled on 7/16/2021   CBC/CMP on 10/2020 with normal results noted; LDL at 129 mg/dL  ASSESSMENT AND PLAN  Mrs Janie Wahl has presented to [facility] for follow-up on multiple sclerosis and related issues  Patient has been taking glatiramer acetate 40 mg with no disease progression has been reported, though patient described more tingling sensation involving upper lower extremity, predominantly the left hand sensory dysfunction interfere with her daily activities, as patient is left handed    Patient has schedule brain and spine imaging studies in July 2021, we will consider evaluation for multiple sclerosis, at the same time for degenerative disc disease and radiculopathy-  Imaging studies completed in June 2018 were consistent with degenerative changes in C5-C6 in addition to demyelinating plaque at C2, C2-C3, C4 level in cervical region  Patient was advised to consider increasing dose of gabapentin, gabapentin 300 mg capsules were provided, patient is gradually titrate up to 300 in the morning, 300 at noon, 600 at night, as long as no side effects such as sedation reported  Patient was offered baclofen 10 mg at night, lumbar radiculopathy is likely other consideration in addition to multiple sclerosis related neuropathic pain  Lumbar x-ray will be repeated with last imaging done in 2019 with questionable L1 compression fracture reported  Generic Prozac 10 mg refill was provided  We also discussed other options of medical treatment of multiple sclerosis, we agreed patient would not benefit from B-cell depleting therapy such as ocrelizumab, due to immuno senescence and benign nature of her multiple sclerosis at this age, and COVID-19 pandemic outbreak  Patient is to follow with [facility] within 6 months        Past Medical History:   Diagnosis Date    Asthma     Asthma with COPD ([facility] [address] )     last assessed-9/1/2017    Closed fracture of fourth lumbar vertebra ([address] )     unspecified fracture morphology, initial [id number]-2/31/2017    COPD (chronic obstructive pulmonary disease) (HCC)     Heart murmur     last assessed-5/4/2012    Laceration of finger     last assessed-2/26/2014    Lipoma     last assessed-5/3/2013    Migraine     last assessed-1/16/2013    Multiple sclerosis ([facility] [address] )     last assessed-11/14/20172489-[id number]    Obstructive sleep apnea     last assessed-9/23/2013    Osteoarthritis     last assessed-5/4/2012    Post traumatic stress disorder     last assessed-12/13/2013    Tenosynovitis of thumb     left thumb-septic-last assessed-3/26/2014    Weight gain     last assessed-10/17/2013-50lb in about a year with frequent steroids       Past Surgical History:   Procedure Laterality Date    FINGER SURGERY      KNEE ARTHROSCOPY W/ MENISCAL REPAIR Right     description: 4/2012-KNEE ARTHROSCOPY W/ MEDIAL MENISCAL REPAIR        Current Outpatient Medications   Medication Sig Dispense Refill    albuterol (2 5 mg/3 mL) 0 083 % nebulizer solution Take 1 vial (2 5 mg total) by nebulization every 4 (four) hours as needed for wheezing or shortness of breath 25 vial 5    FLUoxetine (PROzac) 10 mg capsule TAKE 1 CAPSULE BY MOUTH EVERY DAY 30 capsule 5    gabapentin (NEURONTIN) 300 mg capsule Take 1 caps in the morning and noon and 2 caps before bedtime 360 capsule 1    Glatiramer Acetate 40 MG/ML SOSY Inject 1 mL (40 mg total) under the skin 3 (three) times a week 12 mL 11    loratadine (CLARITIN) 10 mg tablet Take 1 tablet (10 mg total) by mouth daily 90 tablet 1    mometasone-formoterol (DULERA) 100-5 MCG/ACT inhaler Inhale 2 puffs 2 (two) times a day Rinse mouth after use  13 g 5    SUMAtriptan (IMITREX) 100 mg tablet Take 1 tablet (100 mg total) by mouth once as needed for migraine for up to 1 dose 30 tablet 1    umeclidinium bromide (INCRUSE ELLIPTA) 62 5 mcg/inh AEPB inhaler Inhale 1 puff daily 1 Inhaler 5    baclofen 10 mg tablet Take 1 tablet (10 mg total) by mouth daily at bedtime as needed for muscle spasms 30 tablet 0    FLUoxetine (PROzac) 10 mg capsule Take 1 capsule (10 mg total) by mouth daily Brand Prozac ONLY 90 capsule 1    Ventolin  (90 Base) MCG/ACT inhaler INHALE 1-2 PUFFS EVERY 4 HOURS AS NEEDED FOR WHEEZING OR SHORTNESS OF BREATH EVERY 4 TO 6 HOURS AS NEEDED AND AS DIRECTED 18 Inhaler 1     No current facility-administered medications for this visit          Allergies   Allergen Reactions    No Known Allergies        Review of Systems   Constitutional: Negative  Negative for appetite change and fever  HENT: Negative  Negative for hearing loss, tinnitus, trouble swallowing and voice change  Eyes: Positive for visual disturbance (double vision and bright white lines)  Negative for photophobia and pain  Respiratory: Negative  Negative for shortness of breath  Cardiovascular: Negative  Negative for palpitations  Gastrointestinal: Negative  Negative for nausea and vomiting  Endocrine: Negative  Negative for cold intolerance  Genitourinary: Negative  Negative for dysuria, frequency and urgency  Musculoskeletal: Positive for back pain (comes and goes)  Negative for myalgias and neck pain  Balance off   Skin: Negative  Negative for rash  Neurological: Positive for dizziness, light-headedness and headaches (daily)  Negative for tremors, seizures, syncope, facial asymmetry, speech difficulty, weakness (both hands) and numbness (both hands and tingling in both hands)  Hematological: Bruises/bleeds easily  Psychiatric/Behavioral: Negative  Negative for confusion, hallucinations and sleep disturbance  Video Exam    Vitals:    06/28/21 1221   Weight: 82 1 kg (181 lb)   Height: 5' 1" (1 549 m)       Physical Exam   CONSTITUTIONAL: NAD, pleasant  NECK: supple,PSYCH: appropriate mood and affect  NEUROLOGIC COMPREHENSIVE EXAM: Patient is oriented to person, place and time, NAD; appropriate affect  CN II, III, IV, V, VI, VII,VIII,IX,X,XI-XII intact with EOMI, PERRLA,  symmetric face noted  Motor: grossly intact UE/LE bilateral symmetric; Tandem gait is abnormal     I spent 25 minutes with patient today in which greater than 50% of the time was spent in counseling/coordination of care regarding MS and relatedissues       VIRTUAL VISIT [address] acknowledges that she has consented to an online visit or consultation   She understands that the online visit is based solely on information provided by her, and that, in the absence of a face-to-face physical evaluation by the physician, the diagnosis she receives is both limited and provisional in terms of accuracy and completeness  This is not intended to replace a full medical face-to-face evaluation by the physician  Ajay Agosto understands and accepts these terms

## 2021-06-28 NOTE — PROGRESS NOTES
Virtual Regular Visit      Assessment/Plan:    Problem List Items Addressed This Visit        Nervous and Auditory    Multiple sclerosis, relapsing-remitting ([facility] [address] ) - Primary    Relevant Medications    gabapentin (NEURONTIN) 300 mg capsule    FLUoxetine (PROzac) 10 mg capsule    baclofen 10 mg tablet    Other Relevant Orders    XR spine lumbar minimum 4 views non injury       Other    Vitamin D deficiency    Bipolar 1 disorder (HCC)    Relevant Medications    FLUoxetine (PROzac) 10 mg capsule    Ambulatory dysfunction    Relevant Orders    XR spine lumbar minimum 4 views non injury      Other Visit Diagnoses     Neuropathic pain        Relevant Medications    gabapentin (NEURONTIN) 300 mg capsule    baclofen 10 mg tablet    Other Relevant Orders    XR spine lumbar minimum 4 views non injury               Reason for visit is   Chief Complaint   Patient presents with    Virtual Regular Visit        Encounter provider Luz Fenton MD    Provider located at [address] [facility] [address]      Recent Visits  Date Type Provider Dept   06/23/21 Telephone Nolvia Malik RN [facility]   Showing recent visits within past 7 days and meeting all other requirements  Today's Visits  Date Type Provider Dept   06/28/21 Telemedicine [facility]   Showing today's visits and meeting all other requirements  Future Appointments  No visits were found meeting these conditions  Showing future appointments within next 150 days and meeting all other requirements       The patient was identified by name and date of birth  Noberto Peabody was informed that this is a telemedicine visit and that the visit is being conducted through {AMB CORONAVIRUS VISIT HSABRV:84759}    {Telemedicine confidentiality :56774} {Telemedicine participants:73724}  She acknowledged consent and understanding of privacy and security of the video platform  The patient has agreed to participate and understands they can discontinue the visit at any time  Patient is aware this is a billable service  Subjective  Richard De La Cruz is a 64 y o  female ***         HPI     Past Medical History:   Diagnosis Date    Asthma     Asthma with COPD ([facility] [address] )     last assessed-9/1/2017    Closed fracture of fourth lumbar vertebra ([facility] [address] )     unspecified fracture morphology, initial [id number]-wujhsqfi-5/31/2017    COPD (chronic obstructive pulmonary disease) ([facility]     Heart murmur     last assessed-5/4/2012    Laceration of finger     last assessed-2/26/2014    Lipoma     last assessed-5/3/2013    Migraine     last assessed-1/16/2013    Multiple sclerosis ([facility])     last assessed-11/14/20171421-[id number]-[id number]    Obstructive sleep apnea     last assessed-9/23/2013    Osteoarthritis     last assessed-5/4/2012    Post traumatic stress disorder     last assessed-12/13/2013    Tenosynovitis of thumb     left thumb-septic-last assessed-3/26/2014    Weight gain     last assessed-10/17/2013-50lb in about a year with frequent steroids       Past Surgical History:   Procedure Laterality Date    FINGER SURGERY      KNEE ARTHROSCOPY W/ MENISCAL REPAIR Right     description: 4/2012-KNEE ARTHROSCOPY W/ MEDIAL MENISCAL REPAIR        Current Outpatient Medications   Medication Sig Dispense Refill    albuterol (2 5 mg/3 mL) 0 083 % nebulizer solution Take 1 vial (2 5 mg total) by nebulization every 4 (four) hours as needed for wheezing or shortness of breath 25 vial 5    FLUoxetine (PROzac) 10 mg capsule TAKE 1 CAPSULE BY MOUTH EVERY DAY 30 capsule 5    gabapentin (NEURONTIN) 300 mg capsule Take 1 caps in the morning and noon and 2 caps before bedtime 360 capsule 1    Glatiramer Acetate 40 MG/ML SOSY Inject 1 mL (40 mg total) under the skin 3 (three) times a week 12 mL 11    loratadine (CLARITIN) 10 mg tablet Take 1 tablet (10 mg total) by mouth daily 90 tablet 1    mometasone-formoterol (DULERA) 100-5 MCG/ACT inhaler Inhale 2 puffs 2 (two) times a day Rinse mouth after use  13 g 5    SUMAtriptan (IMITREX) 100 mg tablet Take 1 tablet (100 mg total) by mouth once as needed for migraine for up to 1 dose 30 tablet 1    umeclidinium bromide (INCRUSE ELLIPTA) 62 5 mcg/inh AEPB inhaler Inhale 1 puff daily 1 Inhaler 5    baclofen 10 mg tablet Take 1 tablet (10 mg total) by mouth daily at bedtime as needed for muscle spasms 30 tablet 0    FLUoxetine (PROzac) 10 mg capsule Take 1 capsule (10 mg total) by mouth daily Brand Prozac ONLY 90 capsule 1    Ventolin  (90 Base) MCG/ACT inhaler INHALE 1-2 PUFFS EVERY 4 HOURS AS NEEDED FOR WHEEZING OR SHORTNESS OF BREATH EVERY 4 TO 6 HOURS AS NEEDED AND AS DIRECTED 18 Inhaler 1     No current facility-administered medications for this visit  Allergies   Allergen Reactions    No Known Allergies        Review of Systems    Video Exam    Vitals:    06/28/21 1221   Weight: 82 1 kg (181 lb)   Height: 5' 1" (1 549 m)       Physical Exam     {covid time spent:26250}      VIRTUAL VISIT DISCLAIMER    Minerva Arvizussing acknowledges that she has consented to an online visit or consultation  She understands that the online visit is based solely on information provided by her, and that, in the absence of a face-to-face physical evaluation by the physician, the diagnosis she receives is both limited and provisional in terms of accuracy and completeness  This is not intended to replace a full medical face-to-face evaluation by the physician  Berenice Bamberger understands and accepts these terms

## 2021-06-29 ENCOUNTER — TELEPHONE (OUTPATIENT)
Dept: NEUROLOGY | Facility: CLINIC | Age: 61
End: 2021-06-29

## 2021-06-29 RX ORDER — FLUOXETINE 10 MG/1
10 CAPSULE ORAL DAILY
Qty: 90 CAPSULE | Refills: 1 | Status: SHIPPED | OUTPATIENT
Start: 2021-06-29 | End: 2021-07-28 | Stop reason: SDUPTHER

## 2021-06-29 NOTE — TELEPHONE ENCOUNTER
lmom advised mailed avs and xray order patient has f/up [id number] Dr Ganesh Camarillo / in addition [id number] 2pm [facility]

## 2021-07-16 ENCOUNTER — HOSPITAL ENCOUNTER (OUTPATIENT)
Dept: RADIOLOGY | Facility: HOSPITAL | Age: 61
Discharge: HOME | End: 2021-07-16
Attending: PSYCHIATRY & NEUROLOGY
Payer: COMMERCIAL

## 2021-07-16 DIAGNOSIS — G35 MULTIPLE SCLEROSIS (HCC): ICD-10-CM

## 2021-07-16 PROCEDURE — 72156 MRI NECK SPINE W/O & W/DYE: CPT

## 2021-07-16 PROCEDURE — G1004 CDSM NDSC: HCPCS

## 2021-07-16 PROCEDURE — A9585 GADOBUTROL INJECTION: HCPCS | Performed by: PSYCHIATRY & NEUROLOGY

## 2021-07-16 PROCEDURE — 70553 MRI BRAIN STEM W/O & W/DYE: CPT

## 2021-07-16 RX ADMIN — GADOBUTROL 8 ML: 604.72 INJECTION INTRAVENOUS at 19:56

## 2021-07-22 DIAGNOSIS — M79.2 NEUROPATHIC PAIN: ICD-10-CM

## 2021-07-22 DIAGNOSIS — G35 MULTIPLE SCLEROSIS, RELAPSING-REMITTING (HCC): ICD-10-CM

## 2021-07-22 RX ORDER — BACLOFEN 10 MG/1
10 TABLET ORAL
Qty: 30 TABLET | Refills: 0 | Status: SHIPPED | OUTPATIENT
Start: 2021-07-22 | End: 2021-07-28 | Stop reason: SDUPTHER

## 2021-07-23 ENCOUNTER — TELEPHONE (OUTPATIENT)
Dept: FAMILY MEDICINE CLINIC | Facility: CLINIC | Age: 61
End: 2021-07-23

## 2021-07-23 NOTE — TELEPHONE ENCOUNTER
[facility]'s neurology called and they are requesting an ambulatory referral to neurology to be placed in epic

## 2021-07-28 ENCOUNTER — OFFICE VISIT (OUTPATIENT)
Dept: NEUROLOGY | Facility: CLINIC | Age: 61
End: 2021-07-28
Payer: COMMERCIAL

## 2021-07-28 VITALS
DIASTOLIC BLOOD PRESSURE: 74 MMHG | WEIGHT: 188.9 LBS | SYSTOLIC BLOOD PRESSURE: 108 MMHG | HEART RATE: 80 BPM | HEIGHT: 60 IN | BODY MASS INDEX: 37.08 KG/M2

## 2021-07-28 DIAGNOSIS — M79.18 MYOFASCIAL PAIN DYSFUNCTION SYNDROME: ICD-10-CM

## 2021-07-28 DIAGNOSIS — M79.2 NEUROPATHIC PAIN: ICD-10-CM

## 2021-07-28 DIAGNOSIS — M47.22 OSTEOARTHRITIS OF SPINE WITH RADICULOPATHY, CERVICAL REGION: ICD-10-CM

## 2021-07-28 DIAGNOSIS — R26.2 AMBULATORY DYSFUNCTION: ICD-10-CM

## 2021-07-28 DIAGNOSIS — G89.4 CHRONIC PAIN DISORDER: ICD-10-CM

## 2021-07-28 DIAGNOSIS — G35 MULTIPLE SCLEROSIS, RELAPSING-REMITTING (HCC): Primary | ICD-10-CM

## 2021-07-28 PROCEDURE — 99215 OFFICE O/P EST HI 40 MIN: CPT | Performed by: PSYCHIATRY & NEUROLOGY

## 2021-07-28 PROCEDURE — 3008F BODY MASS INDEX DOCD: CPT | Performed by: PSYCHIATRY & NEUROLOGY

## 2021-07-28 PROCEDURE — 4004F PT TOBACCO SCREEN RCVD TLK: CPT | Performed by: PSYCHIATRY & NEUROLOGY

## 2021-07-28 RX ORDER — BACLOFEN 10 MG/1
10 TABLET ORAL
Qty: 90 TABLET | Refills: 3 | Status: SHIPPED | OUTPATIENT
Start: 2021-07-28 | End: 2022-08-05

## 2021-07-28 RX ORDER — GABAPENTIN 300 MG/1
CAPSULE ORAL
Qty: 360 CAPSULE | Refills: 1 | Status: SHIPPED | OUTPATIENT
Start: 2021-07-28 | End: 2022-05-11

## 2021-07-28 NOTE — PROGRESS NOTES
[facility] MULTIPLE SCLEROSIS CENTER  PATIENT:  Bull Vang  MRN:  [id number]  :  1960  DATE OF SERVICE:  2021    Assessment/Plan:           Problem List Items Addressed This Visit        Nervous and Auditory    Multiple sclerosis, relapsing-remitting ([facility] ) - Primary    Relevant Medications    gabapentin (NEURONTIN) 300 mg capsule    baclofen 10 mg tablet    Other Relevant Orders    Ambulatory referral to Pain Management       Other    Chronic pain disorder    Relevant Orders    Ambulatory referral to Pain Management    Ambulatory dysfunction      Other Visit Diagnoses     Neuropathic pain        Relevant Medications    gabapentin (NEURONTIN) 300 mg capsule    baclofen 10 mg tablet    Myofascial pain dysfunction syndrome        Osteoarthritis of spine with radiculopathy, cervical region        Relevant Orders    Ambulatory referral to Pain Management         Mrs Cheli Hall presented to [address] [place] [address] [address] for follow-up on multiple sclerosis related issues  Patient described no new focal neurological deficit with persistent neck pain reported especially after night time sleep; patient has known history of cervical degenerative disc disease, patient was working with chiropractor and pain management in the past with no significant benefits has been described  Patient had completed brain and cervical spine, we personally reviewed patient imaging studies, we agreed patient has stable radiographic findings were last couple years and at this point we may consider slow taper of glatiramer acetate including taking glatiramer acetate 40 mg twice a week instead of 3 times a week, and this regimen should continued for 2 months  If patient developed no breakthrough disease over 2 months, we may consider extending patient with ear matter acetate 40 mg once weekly for another 2 months for evaluation required at that time    Patient was advised to consider gabapentin and baclofen at her current regimen;  Pain management re-evaluation recommended here with [facility] team     Follow up in 5 months    Subjective: blurr vision, myalgias neck pain, pt states her back feels like its locking up for 2 weeks, light headedness, dizziness, numbness in hands and both feet;    HPI    Mrs Bandar Qiu has presented to [address] [place] [address] [address] for follow-up on multiple sclerosis and related issues  Patient has been taking glatiramer acetate 40 mg with no disease progression has been reported, though patient described more tingling sensation involving upper lower extremity, predominantly the left hand sensory dysfunction interfere with her daily activities, as patient is left handed  Patient has schedule brain and spine imaging studies in July 2021, we will consider evaluation for multiple sclerosis, at the same time for degenerative disc disease and radiculopathy-  Imaging studies completed in June 2018 were consistent with degenerative changes in C5-C6 in addition to demyelinating plaque at C2, C2-C3, C4 level in cervical region        Patient was advised to consider increasing dose of gabapentin, gabapentin 300 mg capsules were provided, patient is gradually titrate up to 300 in the morning, 300 at noon, 600 at night, as long as no side effects such as sedation reported  Patient was offered baclofen 10 mg at night, lumbar radiculopathy is likely other consideration in addition to multiple sclerosis related neuropathic pain  Lumbar x-ray will be repeated with last imaging done in 2019 with questionable L1 compression fracture reported  Generic Prozac 10 mg refill was provided  We also discussed other options of medical treatment of multiple sclerosis, we agreed patient would not benefit from B-cell depleting therapy such as ocrelizumab, due to immuno senescence and benign nature of her multiple sclerosis at this age, and COVID-19 pandemic outbreak      Patient described today: blurr vision, sob, myalgias neck pain, pt states her back feels like its locking up for 2 weeks, light headedness, dizziness, numbness in hands and both feet    MRI brain 7/16/2021: Moderate burden of subacute plaque in the setting of known multiple sclerosis  Overall stable MRI of the brain compared to the prior study given the differences in imaging technique  MRI C-spine 7/16/2021: Demyelinating foci are identified in the cervical spinal cord stable from prior study  No new or enhancing lesions are identified  The following portions of the patient's history were reviewed and updated as appropriate:   She  has a past medical history of Asthma, Asthma with COPD ([facility] ), Closed fracture of fourth lumbar vertebra ([facility] ), COPD (chronic obstructive pulmonary disease) ([facility] ), Heart murmur, Laceration of finger, Lipoma, Migraine, Multiple sclerosis ([facility] ), Obstructive sleep apnea, Osteoarthritis, Post traumatic stress disorder, Tenosynovitis of thumb, and Weight gain  She   Patient Active Problem List    Diagnosis Date Noted    COVID-19 12/23/2020    Right ankle sprain 07/07/2019    Ambulatory dysfunction 07/06/2019    Bipolar 1 disorder ([address] ) 08/23/2018    Chronic pain syndrome 05/10/2018    Degenerative lumbar spinal stenosis 11/14/2017    Anxiety and depression 11/14/2017    Elevated LDL cholesterol level 09/01/2017    PTSD (post-traumatic stress disorder) 07/31/2017    Chronic pain disorder 07/31/2017    [facility], [facility] 03/16/2017    Migraine 03/16/2017    Vitamin D deficiency 12/12/2016    Ast[facility] with COPD [facility] ) 01/08/2015    Fibromyalgia 05/04/2012     She  has a past surgical history that includes Knee arthroscopy w/ meniscal repair (Right) and Finger surgery  Her family history includes Asthma in her brother and father; COPD in her father; Diabetes in her father; Hepatitis in her mother; Hypertension in her father  She  reports that she has been smoking cigarettes   She has a 12 50 pack-year smoking history  She has never used smokeless tobacco  She reports previous alcohol use  She reports that she does not use drugs  Current Outpatient Medications   Medication Sig Dispense Refill    albuterol (2 5 mg/3 mL) 0 083 % nebulizer solution Take 1 vial (2 5 mg total) by nebulization every 4 (four) hours as needed for wheezing or shortness of breath 25 vial 5    baclofen 10 mg tablet Take 1 tablet (10 mg total) by mouth daily at bedtime as needed for muscle spasms 90 tablet 3    FLUoxetine (PROzac) 10 mg capsule TAKE 1 CAPSULE BY MOUTH EVERY DAY 30 capsule 5    gabapentin (NEURONTIN) 300 mg capsule Take 1 caps in the morning and noon and 2 caps before bedtime 360 capsule 1    Glatiramer Acetate 40 MG/ML SOSY Inject 1 mL (40 mg total) under the skin 3 (three) times a week 12 mL 11    loratadine (CLARITIN) 10 mg tablet Take 1 tablet (10 mg total) by mouth daily 90 tablet 1    SUMAtriptan (IMITREX) 100 mg tablet Take 1 tablet (100 mg total) by mouth once as needed for migraine for up to 1 dose 30 tablet 1    umeclidinium bromide (INCRUSE ELLIPTA) 62 5 mcg/inh AEPB inhaler Inhale 1 puff daily 1 Inhaler 5    Ventolin  (90 Base) MCG/ACT inhaler INHALE 1-2 PUFFS EVERY 4 HOURS AS NEEDED FOR WHEEZING OR SHORTNESS OF BREATH EVERY 4 TO 6 HOURS AS NEEDED AND AS DIRECTED 18 Inhaler 1    mometasone-formoterol (DULERA) 100-5 MCG/ACT inhaler Inhale 2 puffs 2 (two) times a day Rinse mouth after use  (Patient not taking: Reported on 7/28/2021) 13 g 5     No current facility-administered medications for this visit       Current Outpatient Medications on File Prior to Visit   Medication Sig    albuterol (2 5 mg/3 mL) 0 083 % nebulizer solution Take 1 vial (2 5 mg total) by nebulization every 4 (four) hours as needed for wheezing or shortness of breath    FLUoxetine (PROzac) 10 mg capsule TAKE 1 CAPSULE BY MOUTH EVERY DAY    Glatiramer Acetate 40 MG/ML SOSY Inject 1 mL (40 mg total) under the skin 3 (three) times a week    loratadine (CLARITIN) 10 mg tablet Take 1 tablet (10 mg total) by mouth daily    SUMAtriptan (IMITREX) 100 mg tablet Take 1 tablet (100 mg total) by mouth once as needed for migraine for up to 1 dose    umeclidinium bromide (INCRUSE ELLIPTA) 62 5 mcg/inh AEPB inhaler Inhale 1 puff daily    Ventolin  (90 Base) MCG/ACT inhaler INHALE 1-2 PUFFS EVERY 4 HOURS AS NEEDED FOR WHEEZING OR SHORTNESS OF BREATH EVERY 4 TO 6 HOURS AS NEEDED AND AS DIRECTED    [DISCONTINUED] baclofen 10 mg tablet TAKE 1 TABLET (10 MG TOTAL) BY MOUTH DAILY AT BEDTIME AS NEEDED FOR MUSCLE SPASMS    [DISCONTINUED] gabapentin (NEURONTIN) 300 mg capsule Take 1 caps in the morning and noon and 2 caps before bedtime (Patient taking differently: 2 caps before bedtime)    mometasone-formoterol (DULERA) 100-5 MCG/ACT inhaler Inhale 2 puffs 2 (two) times a day Rinse mouth after use  (Patient not taking: Reported on 7/28/2021)    [DISCONTINUED] FLUoxetine (PROzac) 10 mg capsule TAKE 1 CAPSULE (10 MG TOTAL) BY MOUTH DAILY BRAND PROZAC ONLY (Patient not taking: Reported on 7/28/2021)     No current facility-administered medications on file prior to visit  She is allergic to no known allergies            Objective:    Blood pressure 108/74, pulse 80, height 5' (1 524 m), weight 85 7 kg (188 lb 14 4 oz)  Physical Exam    Neurological Exam    Gait  25 foot walk 9 30 seconds  CONSTITUTIONAL: NAD, pleasant  NECK: supple, no lymphadenopathy, no thyromegaly, no JVD  CARDIOVASCULAR: RRR, normal S1S2, no murmurs, no rubs  RESP: clear to auscultation bilaterally, no wheezes/rhonchi/rales  ABDOMEN: soft, non tender, non distended  SKIN: no rash or skin lesions  EXTREMITIES: no edema, pulses 2+bilaterally  PSYCH: appropriate mood and affect  NEUROLOGIC COMPREHENSIVE EXAM: Patient is oriented to person, place and time, NAD; appropriate affect   CN II, III, IV, V, VI, VII,VIII,IX,X,XI-XII intact with EOMI, PERRLA, OKN intact, VF grossly intact, fundi poorly visualized secondary to pupillary constriction; symmetric face noted  Motor: 5/5 UE/LE bilateral symmetric; Sensory: intact to light touch and pinprick bilaterally; normal vibration sensation feet bilaterally; Coordination within normal limits on FTN and MARTIN testing; DTR: 2/4 through, no Babinski, no clonus  Tandem gait is intact  Romberg: negative  ROS:  12 points of review of system was reviewed with the patient and was unremarkable with exception: see HPI  Review of Systems   Constitutional: Negative  Negative for appetite change and fever  HENT: Negative  Negative for hearing loss, tinnitus, trouble swallowing and voice change  Eyes: Positive for visual disturbance (blurr vision)  Negative for photophobia and pain  Respiratory: Positive for shortness of breath  Cardiovascular: Negative  Negative for palpitations  Gastrointestinal: Negative  Negative for nausea and vomiting  Endocrine: Negative  Negative for cold intolerance  Genitourinary: Negative  Negative for dysuria, frequency and urgency  Musculoskeletal: Positive for myalgias and neck pain  Pt states her back feels like its locking up for 2weeks   Skin: Negative  Negative for rash  Neurological: Positive for dizziness, light-headedness and numbness (hands and both feet)  Negative for tremors, seizures, syncope, facial asymmetry, speech difficulty, weakness and headaches  Hematological: Negative  Does not bruise/bleed easily  Psychiatric/Behavioral: Negative  Negative for confusion, hallucinations and sleep disturbance

## 2021-08-05 DIAGNOSIS — G43.909 MIGRAINE WITHOUT STATUS MIGRAINOSUS, NOT INTRACTABLE, UNSPECIFIED MIGRAINE TYPE: ICD-10-CM

## 2021-08-06 RX ORDER — SUMATRIPTAN 100 MG/1
100 TABLET, FILM COATED ORAL ONCE AS NEEDED
Qty: 9 TABLET | Refills: 2 | Status: SHIPPED | OUTPATIENT
Start: 2021-08-06 | End: 2022-08-04

## 2021-08-11 DIAGNOSIS — M79.2 NEUROPATHIC PAIN: Primary | ICD-10-CM

## 2021-08-11 NOTE — TELEPHONE ENCOUNTER
Pt reports she has been taking gabapentin for nerve pain in her feet however her ankles are "screaming " States she is in so much pain in her ankles that she cannot sleep  Reports stabbing shooting pain "like [she] broke her ankle " Pain is in both ankles but worse on the left side  Pain is in the outer ankle almost to the side of her foot  Pain will shoot up her leg at times  Notes some swelling at times in ankle and top of foot  States she has been limping  Denies injury  She has not had her ankles assessed yet  Pt adds she increased her gabapentin to help with the pain  She is taking 300mg caps 2 in the AM, 2 at noon, and 3 at night  Also increased baclofen 10mg tabs and took 2 tabs last night  She has not had any relief  I advised pt she needs to see PCP or urgent care for evaluation  Pt reports she will go to the ER as her insurance doesn't cover urgent care  Please advise if any further recommendations

## 2021-08-12 RX ORDER — PRAMIPEXOLE DIHYDROCHLORIDE 0.12 MG/1
0.12 TABLET ORAL 2 TIMES DAILY
Qty: 30 TABLET | Refills: 0 | Status: SHIPPED | OUTPATIENT
Start: 2021-08-12 | End: 2021-08-16 | Stop reason: SDUPTHER

## 2021-08-13 NOTE — TELEPHONE ENCOUNTER
Received voicemail from patient returning our call           Called and Left a message on pt's answering machine for a call back

## 2021-08-18 NOTE — TELEPHONE ENCOUNTER
Dr Mary Dash - just following up on encounter  Please see patient's question in bold print in note below  Thanks!

## 2021-08-19 RX ORDER — PRAMIPEXOLE DIHYDROCHLORIDE 0.12 MG/1
0.12 TABLET ORAL 2 TIMES DAILY
Qty: 30 TABLET | Refills: 0 | Status: SHIPPED | OUTPATIENT
Start: 2021-08-19 | End: 2021-11-24 | Stop reason: ALTCHOICE

## 2021-08-20 NOTE — TELEPHONE ENCOUNTER
From previous message:    Patient increased Gabapentin and Baclofen on her own (see note below)  Dr Dana Herrera - just to clarify - Patient wants to confirm she can take Pramipexole 0 125 mg in addition to increased doses of Gabapentin and Baclofen

## 2021-08-24 NOTE — TELEPHONE ENCOUNTER
Pt left voicemail stating she went to the ER for her ankle pain  States she was told they saw soft tissue inflammation  Notes she is in a lot of pain  Per chart review, pt went to [facility], referral was given for ortho  Called and left detailed message making pt aware that she will need to contact her PCP and ortho for any further concerns or questions about her ankle pain

## 2021-10-19 ENCOUNTER — OFFICE VISIT (OUTPATIENT)
Dept: PODIATRY | Facility: CLINIC | Age: 61
End: 2021-10-19
Payer: COMMERCIAL

## 2021-10-19 VITALS
DIASTOLIC BLOOD PRESSURE: 79 MMHG | HEIGHT: 60 IN | WEIGHT: 190 LBS | BODY MASS INDEX: 37.3 KG/M2 | HEART RATE: 81 BPM | SYSTOLIC BLOOD PRESSURE: 136 MMHG

## 2021-10-19 DIAGNOSIS — M76.72 PERONEAL TENDINITIS OF LEFT LOWER EXTREMITY: ICD-10-CM

## 2021-10-19 DIAGNOSIS — D17.24 LIPOMA OF BOTH LOWER EXTREMITIES: ICD-10-CM

## 2021-10-19 DIAGNOSIS — D17.23 LIPOMA OF BOTH LOWER EXTREMITIES: ICD-10-CM

## 2021-10-19 DIAGNOSIS — M19.072 ARTHRITIS OF LEFT ANKLE: Primary | ICD-10-CM

## 2021-10-19 PROCEDURE — 20550 NJX 1 TENDON SHEATH/LIGAMENT: CPT | Performed by: PODIATRIST

## 2021-10-19 PROCEDURE — 99203 OFFICE O/P NEW LOW 30 MIN: CPT | Performed by: PODIATRIST

## 2021-10-19 RX ORDER — LIDOCAINE HYDROCHLORIDE 10 MG/ML
1 INJECTION, SOLUTION EPIDURAL; INFILTRATION; INTRACAUDAL; PERINEURAL ONCE
Status: COMPLETED | OUTPATIENT
Start: 2021-10-19 | End: 2021-10-19

## 2021-10-19 RX ORDER — TRIAMCINOLONE ACETONIDE 40 MG/ML
20 INJECTION, SUSPENSION INTRA-ARTICULAR; INTRAMUSCULAR ONCE
Status: COMPLETED | OUTPATIENT
Start: 2021-10-19 | End: 2021-10-19

## 2021-10-19 RX ADMIN — TRIAMCINOLONE ACETONIDE 20 MG: 40 INJECTION, SUSPENSION INTRA-ARTICULAR; INTRAMUSCULAR at 14:22

## 2021-10-19 RX ADMIN — LIDOCAINE HYDROCHLORIDE 1 ML: 10 INJECTION, SOLUTION EPIDURAL; INFILTRATION; INTRACAUDAL; PERINEURAL at 14:22

## 2021-10-20 DIAGNOSIS — F41.9 ANXIETY AND DEPRESSION: ICD-10-CM

## 2021-10-20 DIAGNOSIS — F32.A ANXIETY AND DEPRESSION: ICD-10-CM

## 2021-10-20 RX ORDER — LORATADINE 10 MG/1
TABLET ORAL
Qty: 30 TABLET | Refills: 1 | Status: SHIPPED | OUTPATIENT
Start: 2021-10-20 | End: 2022-04-06 | Stop reason: CLARIF

## 2021-10-25 ENCOUNTER — TELEPHONE (OUTPATIENT)
Dept: PODIATRY | Facility: CLINIC | Age: 61
End: 2021-10-25

## 2021-11-15 ENCOUNTER — TELEPHONE (OUTPATIENT)
Dept: OTHER | Facility: OTHER | Age: 61
End: 2021-11-15

## 2021-11-22 DIAGNOSIS — G35 MULTIPLE SCLEROSIS (HCC): ICD-10-CM

## 2021-11-22 RX ORDER — GLATIRAMER 40 MG/ML
40 INJECTION, SOLUTION SUBCUTANEOUS 3 TIMES WEEKLY
Qty: 12 ML | Refills: 11 | Status: SHIPPED | OUTPATIENT
Start: 2021-11-22 | End: 2022-01-05 | Stop reason: ALTCHOICE

## 2021-11-24 ENCOUNTER — TELEMEDICINE (OUTPATIENT)
Dept: FAMILY MEDICINE CLINIC | Facility: CLINIC | Age: 61
End: 2021-11-24
Payer: COMMERCIAL

## 2021-11-24 VITALS — BODY MASS INDEX: 32.22 KG/M2 | TEMPERATURE: 97.2 F | WEIGHT: 165 LBS

## 2021-11-24 DIAGNOSIS — J44.1 COPD WITH ACUTE EXACERBATION (HCC): Primary | ICD-10-CM

## 2021-11-24 PROCEDURE — 99214 OFFICE O/P EST MOD 30 MIN: CPT | Performed by: FAMILY MEDICINE

## 2021-11-24 RX ORDER — PREDNISONE 20 MG/1
40 TABLET ORAL DAILY
Qty: 14 TABLET | Refills: 0 | Status: SHIPPED | OUTPATIENT
Start: 2021-11-24 | End: 2021-12-01

## 2021-11-24 RX ORDER — AZITHROMYCIN 250 MG/1
TABLET, FILM COATED ORAL
Qty: 6 TABLET | Refills: 0 | Status: SHIPPED | OUTPATIENT
Start: 2021-11-24 | End: 2021-11-29

## 2021-12-20 ENCOUNTER — VBI (OUTPATIENT)
Dept: ADMINISTRATIVE | Facility: OTHER | Age: 61
End: 2021-12-20

## 2021-12-28 ENCOUNTER — APPOINTMENT (OUTPATIENT)
Dept: LAB | Facility: MEDICAL CENTER | Age: 61
End: 2021-12-28
Payer: COMMERCIAL

## 2021-12-28 ENCOUNTER — OFFICE VISIT (OUTPATIENT)
Dept: NEUROLOGY | Facility: CLINIC | Age: 61
End: 2021-12-28
Payer: COMMERCIAL

## 2021-12-28 ENCOUNTER — TELEPHONE (OUTPATIENT)
Dept: NEUROLOGY | Facility: CLINIC | Age: 61
End: 2021-12-28

## 2021-12-28 VITALS
HEIGHT: 60 IN | SYSTOLIC BLOOD PRESSURE: 119 MMHG | DIASTOLIC BLOOD PRESSURE: 69 MMHG | HEART RATE: 74 BPM | TEMPERATURE: 97.7 F | WEIGHT: 194.4 LBS | BODY MASS INDEX: 38.17 KG/M2

## 2021-12-28 DIAGNOSIS — G35 MULTIPLE SCLEROSIS, RELAPSING-REMITTING (HCC): Primary | ICD-10-CM

## 2021-12-28 DIAGNOSIS — G35 MULTIPLE SCLEROSIS, RELAPSING-REMITTING (HCC): ICD-10-CM

## 2021-12-28 DIAGNOSIS — F32.A ANXIETY AND DEPRESSION: ICD-10-CM

## 2021-12-28 DIAGNOSIS — F41.9 ANXIETY AND DEPRESSION: ICD-10-CM

## 2021-12-28 DIAGNOSIS — R26.2 AMBULATORY DYSFUNCTION: ICD-10-CM

## 2021-12-28 LAB
ALBUMIN SERPL BCP-MCNC: 3.6 G/DL (ref 3.5–5)
ALP SERPL-CCNC: 77 U/L (ref 46–116)
ALT SERPL W P-5'-P-CCNC: 39 U/L (ref 12–78)
ANION GAP SERPL CALCULATED.3IONS-SCNC: 4 MMOL/L (ref 4–13)
AST SERPL W P-5'-P-CCNC: 20 U/L (ref 5–45)
BASOPHILS # BLD AUTO: 0.07 THOUSANDS/ΜL (ref 0–0.1)
BASOPHILS NFR BLD AUTO: 1 % (ref 0–1)
BILIRUB SERPL-MCNC: 0.73 MG/DL (ref 0.2–1)
BILIRUB UR QL STRIP: NEGATIVE
BUN SERPL-MCNC: 15 MG/DL (ref 5–25)
CALCIUM SERPL-MCNC: 9.8 MG/DL (ref 8.3–10.1)
CHLORIDE SERPL-SCNC: 106 MMOL/L (ref 100–108)
CLARITY UR: NORMAL
CO2 SERPL-SCNC: 29 MMOL/L (ref 21–32)
COLOR UR: NORMAL
CREAT SERPL-MCNC: 0.8 MG/DL (ref 0.6–1.3)
EOSINOPHIL # BLD AUTO: 0.27 THOUSAND/ΜL (ref 0–0.61)
EOSINOPHIL NFR BLD AUTO: 3 % (ref 0–6)
ERYTHROCYTE [DISTWIDTH] IN BLOOD BY AUTOMATED COUNT: 13.2 % (ref 11.6–15.1)
GFR SERPL CREATININE-BSD FRML MDRD: 80 ML/MIN/1.73SQ M
GLUCOSE SERPL-MCNC: 74 MG/DL (ref 65–140)
GLUCOSE UR STRIP-MCNC: NEGATIVE MG/DL
HCT VFR BLD AUTO: 47.6 % (ref 34.8–46.1)
HGB BLD-MCNC: 15 G/DL (ref 11.5–15.4)
HGB UR QL STRIP.AUTO: NEGATIVE
IMM GRANULOCYTES # BLD AUTO: 0.02 THOUSAND/UL (ref 0–0.2)
IMM GRANULOCYTES NFR BLD AUTO: 0 % (ref 0–2)
KETONES UR STRIP-MCNC: NEGATIVE MG/DL
LEUKOCYTE ESTERASE UR QL STRIP: NEGATIVE
LYMPHOCYTES # BLD AUTO: 2.31 THOUSANDS/ΜL (ref 0.6–4.47)
LYMPHOCYTES NFR BLD AUTO: 24 % (ref 14–44)
MCH RBC QN AUTO: 27.8 PG (ref 26.8–34.3)
MCHC RBC AUTO-ENTMCNC: 31.5 G/DL (ref 31.4–37.4)
MCV RBC AUTO: 88 FL (ref 82–98)
MONOCYTES # BLD AUTO: 0.74 THOUSAND/ΜL (ref 0.17–1.22)
MONOCYTES NFR BLD AUTO: 8 % (ref 4–12)
NEUTROPHILS # BLD AUTO: 6.41 THOUSANDS/ΜL (ref 1.85–7.62)
NEUTS SEG NFR BLD AUTO: 64 % (ref 43–75)
NITRITE UR QL STRIP: NEGATIVE
NRBC BLD AUTO-RTO: 0 /100 WBCS
PH UR STRIP.AUTO: 5.5 [PH]
PLATELET # BLD AUTO: 293 THOUSANDS/UL (ref 149–390)
PMV BLD AUTO: 10.5 FL (ref 8.9–12.7)
POTASSIUM SERPL-SCNC: 3.9 MMOL/L (ref 3.5–5.3)
PROT SERPL-MCNC: 7.9 G/DL (ref 6.4–8.2)
PROT UR STRIP-MCNC: NEGATIVE MG/DL
RBC # BLD AUTO: 5.4 MILLION/UL (ref 3.81–5.12)
SODIUM SERPL-SCNC: 139 MMOL/L (ref 136–145)
SP GR UR STRIP.AUTO: 1.02 (ref 1–1.03)
TSH SERPL DL<=0.05 MIU/L-ACNC: 1.42 UIU/ML (ref 0.36–3.74)
UROBILINOGEN UR QL STRIP.AUTO: 1 E.U./DL
WBC # BLD AUTO: 9.82 THOUSAND/UL (ref 4.31–10.16)

## 2021-12-28 PROCEDURE — 81003 URINALYSIS AUTO W/O SCOPE: CPT | Performed by: PSYCHIATRY & NEUROLOGY

## 2021-12-28 PROCEDURE — 85025 COMPLETE CBC W/AUTO DIFF WBC: CPT

## 2021-12-28 PROCEDURE — 80053 COMPREHEN METABOLIC PANEL: CPT

## 2021-12-28 PROCEDURE — 99215 OFFICE O/P EST HI 40 MIN: CPT | Performed by: PSYCHIATRY & NEUROLOGY

## 2021-12-28 PROCEDURE — 36415 COLL VENOUS BLD VENIPUNCTURE: CPT

## 2021-12-28 PROCEDURE — 84443 ASSAY THYROID STIM HORMONE: CPT

## 2021-12-28 RX ORDER — ZOLPIDEM TARTRATE 5 MG/1
5 TABLET ORAL
Qty: 30 TABLET | Refills: 5 | OUTPATIENT
Start: 2021-12-28 | End: 2021-12-28 | Stop reason: SDUPTHER

## 2021-12-28 RX ORDER — ZOLPIDEM TARTRATE 5 MG/1
5 TABLET ORAL
Qty: 30 TABLET | Refills: 5 | Status: SHIPPED | OUTPATIENT
Start: 2021-12-28 | End: 2022-01-05 | Stop reason: SDUPTHER

## 2021-12-30 ENCOUNTER — TELEPHONE (OUTPATIENT)
Dept: NEUROLOGY | Facility: CLINIC | Age: 61
End: 2021-12-30

## 2021-12-30 DIAGNOSIS — G35 MULTIPLE SCLEROSIS, RELAPSING-REMITTING (HCC): Primary | ICD-10-CM

## 2022-01-05 ENCOUNTER — HOSPITAL ENCOUNTER (OUTPATIENT)
Dept: INFUSION CENTER | Facility: HOSPITAL | Age: 62
Discharge: HOME | End: 2022-01-05
Attending: PSYCHIATRY & NEUROLOGY
Payer: COMMERCIAL

## 2022-01-05 VITALS
DIASTOLIC BLOOD PRESSURE: 82 MMHG | HEART RATE: 65 BPM | SYSTOLIC BLOOD PRESSURE: 133 MMHG | RESPIRATION RATE: 18 BRPM | TEMPERATURE: 98 F

## 2022-01-05 DIAGNOSIS — F41.9 ANXIETY AND DEPRESSION: ICD-10-CM

## 2022-01-05 DIAGNOSIS — F32.A ANXIETY AND DEPRESSION: ICD-10-CM

## 2022-01-05 DIAGNOSIS — G35 MULTIPLE SCLEROSIS, RELAPSING-REMITTING (HCC): ICD-10-CM

## 2022-01-05 DIAGNOSIS — G35 MULTIPLE SCLEROSIS, RELAPSING-REMITTING (HCC): Primary | ICD-10-CM

## 2022-01-05 PROCEDURE — 96365 THER/PROPH/DIAG IV INF INIT: CPT

## 2022-01-05 RX ORDER — SODIUM CHLORIDE 9 MG/ML
20 INJECTION, SOLUTION INTRAVENOUS ONCE
Status: CANCELLED | OUTPATIENT
Start: 2022-01-06

## 2022-01-05 RX ORDER — SODIUM CHLORIDE 9 MG/ML
20 INJECTION, SOLUTION INTRAVENOUS ONCE
Status: COMPLETED | OUTPATIENT
Start: 2022-01-05 | End: 2022-01-05

## 2022-01-05 RX ADMIN — SODIUM CHLORIDE 1000 MG: 0.9 INJECTION, SOLUTION INTRAVENOUS at 13:18

## 2022-01-05 RX ADMIN — SODIUM CHLORIDE 20 ML/HR: 0.9 INJECTION, SOLUTION INTRAVENOUS at 13:14

## 2022-01-05 NOTE — TELEPHONE ENCOUNTER
Patient of Dr Jimmy Amaral, last office visit 12/28    She called to advise she is waiting on script of Nadege Bermudez  I let her know [facility]; she is requesting be redirected to [facility] [address]    Please sign script if in agreement  Nursing Team:   She is also asking about status of vumerity; she is starting first dose of solumedrol today      [facility] [phone]

## 2022-01-06 ENCOUNTER — HOSPITAL ENCOUNTER (OUTPATIENT)
Dept: INFUSION CENTER | Facility: HOSPITAL | Age: 62
Discharge: HOME | End: 2022-01-06
Attending: PSYCHIATRY & NEUROLOGY
Payer: COMMERCIAL

## 2022-01-06 VITALS
RESPIRATION RATE: 20 BRPM | HEART RATE: 68 BPM | TEMPERATURE: 97.8 F | SYSTOLIC BLOOD PRESSURE: 142 MMHG | DIASTOLIC BLOOD PRESSURE: 85 MMHG

## 2022-01-06 DIAGNOSIS — G35 MULTIPLE SCLEROSIS, RELAPSING-REMITTING (HCC): Primary | ICD-10-CM

## 2022-01-06 PROCEDURE — 96365 THER/PROPH/DIAG IV INF INIT: CPT

## 2022-01-06 RX ORDER — ZOLPIDEM TARTRATE 5 MG/1
5 TABLET ORAL
Qty: 30 TABLET | Refills: 5 | Status: SHIPPED | OUTPATIENT
Start: 2022-01-06 | End: 2022-07-09

## 2022-01-06 RX ORDER — SODIUM CHLORIDE 9 MG/ML
20 INJECTION, SOLUTION INTRAVENOUS ONCE
Status: CANCELLED | OUTPATIENT
Start: 2022-01-07

## 2022-01-06 RX ORDER — SODIUM CHLORIDE 9 MG/ML
20 INJECTION, SOLUTION INTRAVENOUS ONCE
Status: COMPLETED | OUTPATIENT
Start: 2022-01-06 | End: 2022-01-06

## 2022-01-06 RX ADMIN — SODIUM CHLORIDE 20 ML/HR: 9 INJECTION, SOLUTION INTRAVENOUS at 13:40

## 2022-01-06 RX ADMIN — SODIUM CHLORIDE 1000 MG: 0.9 INJECTION, SOLUTION INTRAVENOUS at 13:39

## 2022-01-06 NOTE — PROGRESS NOTES
Pt tolerated infusion without incident  PIV remains in place from tomorrow  Pt aware of next appointment, AVS declined

## 2022-01-06 NOTE — TELEPHONE ENCOUNTER
Pt calling to check status of Ambien Rx  States she has started Solumedrol infusions and has nothing to help her sleep at night  Asking for this to be sent to [facility]AP  Getting second infusion today  [facility]ien [facility]- they cannot fill script for Ambien  They only provide specialty medications  New script is needed to be sent to [facility]     Please review and sign if agreeable  I did review Vumerity start process with pt and will address in Vumerity start encounter

## 2022-01-07 ENCOUNTER — HOSPITAL ENCOUNTER (OUTPATIENT)
Dept: INFUSION CENTER | Facility: HOSPITAL | Age: 62
Discharge: HOME | End: 2022-01-07
Attending: PSYCHIATRY & NEUROLOGY
Payer: COMMERCIAL

## 2022-01-07 VITALS
SYSTOLIC BLOOD PRESSURE: 138 MMHG | DIASTOLIC BLOOD PRESSURE: 78 MMHG | RESPIRATION RATE: 16 BRPM | HEART RATE: 98 BPM | TEMPERATURE: 97.7 F

## 2022-01-07 DIAGNOSIS — G35 MULTIPLE SCLEROSIS, RELAPSING-REMITTING (HCC): Primary | ICD-10-CM

## 2022-01-07 PROCEDURE — 96365 THER/PROPH/DIAG IV INF INIT: CPT

## 2022-01-07 RX ORDER — SODIUM CHLORIDE 9 MG/ML
20 INJECTION, SOLUTION INTRAVENOUS ONCE
Status: CANCELLED | OUTPATIENT
Start: 2022-01-07

## 2022-01-07 RX ORDER — SODIUM CHLORIDE 9 MG/ML
20 INJECTION, SOLUTION INTRAVENOUS ONCE
Status: COMPLETED | OUTPATIENT
Start: 2022-01-07 | End: 2022-01-07

## 2022-01-07 RX ADMIN — SODIUM CHLORIDE 1000 MG: 0.9 INJECTION, SOLUTION INTRAVENOUS at 09:47

## 2022-01-07 RX ADMIN — SODIUM CHLORIDE 20 ML/HR: 0.9 INJECTION, SOLUTION INTRAVENOUS at 09:47

## 2022-01-11 ENCOUNTER — TELEPHONE (OUTPATIENT)
Dept: NEUROLOGY | Facility: CLINIC | Age: 62
End: 2022-01-11

## 2022-01-11 NOTE — TELEPHONE ENCOUNTER
Received vm from patient regarding a medication  No details given  Ramos szymanski/[employer] [phone]  Spoke w/patient  She is requesting an update on the status of Vumerity medication  Per chart review, Leoncio Jean-Baptiste was waiting for update from patient's specialty pharmacy  Ramos szymanski/[employer] [phone]  She will check into this and call us back w/a status update  Patient made aware

## 2022-01-12 ENCOUNTER — OFFICE VISIT (OUTPATIENT)
Dept: FAMILY MEDICINE CLINIC | Facility: CLINIC | Age: 62
End: 2022-01-12
Payer: COMMERCIAL

## 2022-01-12 VITALS
HEIGHT: 60 IN | SYSTOLIC BLOOD PRESSURE: 122 MMHG | WEIGHT: 194 LBS | TEMPERATURE: 97.7 F | DIASTOLIC BLOOD PRESSURE: 80 MMHG | OXYGEN SATURATION: 98 % | HEART RATE: 83 BPM | BODY MASS INDEX: 38.09 KG/M2 | RESPIRATION RATE: 18 BRPM

## 2022-01-12 DIAGNOSIS — J44.9 ASTHMA WITH COPD (HCC): Primary | ICD-10-CM

## 2022-01-12 DIAGNOSIS — E66.01 MORBID OBESITY (HCC): ICD-10-CM

## 2022-01-12 DIAGNOSIS — Z13.220 SCREENING FOR HYPERLIPIDEMIA: ICD-10-CM

## 2022-01-12 DIAGNOSIS — G35 MULTIPLE SCLEROSIS, RELAPSING-REMITTING (HCC): ICD-10-CM

## 2022-01-12 DIAGNOSIS — F31.9 BIPOLAR 1 DISORDER (HCC): ICD-10-CM

## 2022-01-12 DIAGNOSIS — Z13.1 SCREENING FOR DIABETES MELLITUS (DM): ICD-10-CM

## 2022-01-12 DIAGNOSIS — Z23 ENCOUNTER FOR VACCINATION: ICD-10-CM

## 2022-01-12 PROBLEM — S93.401A RIGHT ANKLE SPRAIN: Status: RESOLVED | Noted: 2019-07-07 | Resolved: 2022-01-12

## 2022-01-12 PROBLEM — U07.1 COVID-19: Status: RESOLVED | Noted: 2020-12-23 | Resolved: 2022-01-12

## 2022-01-12 PROBLEM — R26.2 AMBULATORY DYSFUNCTION: Status: RESOLVED | Noted: 2019-07-06 | Resolved: 2022-01-12

## 2022-01-12 PROCEDURE — 99214 OFFICE O/P EST MOD 30 MIN: CPT | Performed by: FAMILY MEDICINE

## 2022-01-12 PROCEDURE — 90682 RIV4 VACC RECOMBINANT DNA IM: CPT

## 2022-01-12 PROCEDURE — 90471 IMMUNIZATION ADMIN: CPT

## 2022-01-12 NOTE — PROGRESS NOTES
Assessment/Plan:    Asthma with COPD ([facility] )  Continue dulera and as needed ventolin  She also has a nebulizer  Bipolar 1 disorder ([facility] )   Likely this is bipolar 2 disorder  She is well controlled while on Prozac and refills were given today  Multiple sclerosis, relapsing-remitting ([facility] )  Goes to the [facility]  Diagnoses and all orders for this visit:    Asthma with COPD ([facility] )    Bipolar 1 disorder ([place] [address] )    Multiple sclerosis, relapsing-remitting ([place] [address] )    Encounter for vaccination  -     influenza vaccine, quadrivalent, recombinant, PF, 0 5 mL, for patients 18 yr+ (FLUBLOK)    Morbid obesity ([place] [address] )  -     Ambulatory Referral to Weight Management; Future    Screening for hyperlipidemia  -     Lipid panel; Future    Screening for diabetes mellitus (DM)  -     Comprehensive metabolic panel; Future          Subjective: chronic conditions      Patient ID: Russell Walton is a 63 y o  female with a ho multiple sclerosis relapsing remitting type, bipolar 1 disorder, COPD, fibromyalgia, migraines, PTSD    HPI  Pt has not been seen since 2020  She goes to the [facility] multiple sclerosis clinic  Notes reviewed  She is concerned about her weight  She doesn't do any formal exercise but is active during the day walking around and walking up and down the steps in her house  She does not consume sugary drinks and only eats small amounts of food during the day however the meals are usually high in carbs  For breakfast she eats toast and coffee and for lunch she has a 1/2 sandwich or ramen noodles  Half the time she does not eat dinner  The following portions of the patient's history were reviewed and updated as appropriate: allergies, current medications, past family history, past medical history, past social history, past surgical history and problem list     Review of Systems   Constitutional: Negative for fever and unexpected weight change     HENT: Negative for ear pain, sore throat and trouble swallowing  Eyes: Negative for pain and visual disturbance  Respiratory: Negative for cough, chest tightness, shortness of breath and wheezing  Cardiovascular: Negative for chest pain  Gastrointestinal: Negative for abdominal distention, abdominal pain, blood in stool, constipation, diarrhea, nausea and vomiting  Endocrine: Negative for polydipsia and polyuria  Genitourinary: Negative for dysuria and hematuria  Musculoskeletal: Negative for back pain and myalgias  Skin: Negative for rash  Neurological: Negative for syncope and headaches  Psychiatric/Behavioral: Negative for suicidal ideas  PHQ-2/9 Depression Screening             Objective:      /80 (BP Location: Left arm, Patient Position: Sitting, Cuff Size: Standard)   Pulse 83   Temp 97 7 °F (36 5 °C) (Tympanic)   Resp 18   Ht 5' (1 524 m)   Wt 88 kg (194 lb)   SpO2 98%   BMI 37 89 kg/m²          Physical Exam  Constitutional:       Appearance: She is well-developed  She is obese  HENT:      Head: Normocephalic and atraumatic  Right Ear: External ear normal       Left Ear: External ear normal       Mouth/Throat:      Pharynx: No oropharyngeal exudate  Eyes:      General: No scleral icterus  Conjunctiva/sclera: Conjunctivae normal       Pupils: Pupils are equal, round, and reactive to light  Cardiovascular:      Rate and Rhythm: Normal rate and regular rhythm  Heart sounds: No murmur heard  No friction rub  No gallop  Pulmonary:      Effort: Pulmonary effort is normal  No respiratory distress  Breath sounds: Normal breath sounds  No wheezing or rales  Abdominal:      General: Bowel sounds are normal  There is no distension  Palpations: Abdomen is soft  There is no mass  Tenderness: There is no abdominal tenderness  There is no rebound  Musculoskeletal:         General: Normal range of motion  Cervical back: Normal range of motion and neck supple     Skin:     General: Skin is warm and dry  Neurological:      Mental Status: She is alert and oriented to person, place, and time

## 2022-01-12 NOTE — ASSESSMENT & PLAN NOTE
Likely this is bipolar 2 disorder  She is well controlled while on Prozac and refills were given today

## 2022-01-20 RX ORDER — DIMETHYL FUMARATE 240 MG/1
240 CAPSULE ORAL 2 TIMES DAILY
Qty: 60 CAPSULE | Refills: 11 | Status: SHIPPED | OUTPATIENT
Start: 2022-01-20

## 2022-03-03 DIAGNOSIS — F32.A ANXIETY AND DEPRESSION: ICD-10-CM

## 2022-03-03 DIAGNOSIS — F41.9 ANXIETY AND DEPRESSION: ICD-10-CM

## 2022-03-03 DIAGNOSIS — G35 MULTIPLE SCLEROSIS, RELAPSING-REMITTING (HCC): ICD-10-CM

## 2022-03-03 DIAGNOSIS — J44.9 ASTHMA WITH COPD (HCC): ICD-10-CM

## 2022-03-03 NOTE — TELEPHONE ENCOUNTER
Patient is requesting a refill on her inhalers, patient was made aware that the refills could take up to 24 hours to be refilled due to Dr Raines Her being out of the office  Patient says she is completely out and will need it sooner

## 2022-03-04 ENCOUNTER — TELEPHONE (OUTPATIENT)
Dept: BARIATRICS | Facility: CLINIC | Age: 62
End: 2022-03-04

## 2022-03-04 NOTE — TELEPHONE ENCOUNTER
Sent the patient the info link  She will review and complete and call the office back to schedule her appt  Referral que

## 2022-03-18 ENCOUNTER — APPOINTMENT (EMERGENCY)
Dept: RADIOLOGY | Facility: HOSPITAL | Age: 62
End: 2022-03-18
Payer: COMMERCIAL

## 2022-03-18 ENCOUNTER — HOSPITAL ENCOUNTER (EMERGENCY)
Facility: HOSPITAL | Age: 62
Discharge: HOME | End: 2022-03-18
Attending: EMERGENCY MEDICINE | Admitting: EMERGENCY MEDICINE
Payer: COMMERCIAL

## 2022-03-18 VITALS
OXYGEN SATURATION: 96 % | HEART RATE: 84 BPM | DIASTOLIC BLOOD PRESSURE: 86 MMHG | TEMPERATURE: 99 F | SYSTOLIC BLOOD PRESSURE: 117 MMHG | RESPIRATION RATE: 20 BRPM

## 2022-03-18 DIAGNOSIS — M25.512 LEFT SHOULDER PAIN: Primary | ICD-10-CM

## 2022-03-18 DIAGNOSIS — M25.512 ACUTE PAIN OF LEFT SHOULDER: ICD-10-CM

## 2022-03-18 PROCEDURE — 99284 EMERGENCY DEPT VISIT MOD MDM: CPT | Performed by: EMERGENCY MEDICINE

## 2022-03-18 PROCEDURE — 71045 X-RAY EXAM CHEST 1 VIEW: CPT

## 2022-03-18 PROCEDURE — 99283 EMERGENCY DEPT VISIT LOW MDM: CPT

## 2022-03-18 PROCEDURE — 73030 X-RAY EXAM OF SHOULDER: CPT

## 2022-03-18 PROCEDURE — 96374 THER/PROPH/DIAG INJ IV PUSH: CPT

## 2022-03-18 RX ORDER — OXYCODONE HYDROCHLORIDE AND ACETAMINOPHEN 5; 325 MG/1; MG/1
1 TABLET ORAL ONCE
Status: COMPLETED | OUTPATIENT
Start: 2022-03-18 | End: 2022-03-18

## 2022-03-18 RX ORDER — OXYCODONE HYDROCHLORIDE AND ACETAMINOPHEN 5; 325 MG/1; MG/1
1 TABLET ORAL EVERY 4 HOURS PRN
Qty: 6 TABLET | Refills: 0 | Status: SHIPPED | OUTPATIENT
Start: 2022-03-18

## 2022-03-18 RX ORDER — OXYCODONE HYDROCHLORIDE AND ACETAMINOPHEN 5; 325 MG/1; MG/1
1 TABLET ORAL EVERY 8 HOURS PRN
Qty: 9 TABLET | Refills: 0 | Status: SHIPPED | OUTPATIENT
Start: 2022-03-18 | End: 2022-03-18 | Stop reason: CLARIF

## 2022-03-18 RX ORDER — KETOROLAC TROMETHAMINE 30 MG/ML
15 INJECTION, SOLUTION INTRAMUSCULAR; INTRAVENOUS ONCE
Status: COMPLETED | OUTPATIENT
Start: 2022-03-18 | End: 2022-03-18

## 2022-03-18 RX ADMIN — OXYCODONE HYDROCHLORIDE AND ACETAMINOPHEN 1 TABLET: 5; 325 TABLET ORAL at 15:00

## 2022-03-18 RX ADMIN — KETOROLAC TROMETHAMINE 15 MG: 30 INJECTION, SOLUTION INTRAMUSCULAR at 14:09

## 2022-03-18 NOTE — ED ATTENDING ATTESTATION
Moises Maya DO, saw and evaluated the patient  I have discussed the patient with the resident/non-physician practitioner and agree with the resident's/non-physician practitioner's findings, Plan of Care, and MDM as documented in the resident's/non-physician practitioner's note, except where noted  All available labs and Radiology studies were reviewed  At this point I agree with the current assessment done in the Emergency Department  I have conducted an independent evaluation of this patient including a focused history and a physical exam     ED Note - Russell Walton 63 y o  female MRN: [id number]  Unit/Bed#: ED 05 Encounter: [id number]    History of Present Illness   HPI  Russell Walton is a 63 y o  female who presents for evaluation of left shoulder injury  Patient states that she was moving her heavy bed which is on a carpet today when she felt a popping sensation in her left shoulder  Patient had immediate pain  Limited range of motion to the pain  Neurovascular intact  No other injuries  REVIEW OF SYSTEMS  See HPI for further details  12 systems reviewed and otherwise negative except as noted     Historical Information     PAST MEDICAL HISTORY  Past Medical History:   Diagnosis Date    Asthma     Asthma with COPD ([facility] [address] )     last assessed-9/1/2017    Closed fracture of fourth lumbar vertebra ([address] )     unspecified fracture morphology, initial wrpmhqyyu-cpgkueeq-4/31/2017    COPD (chronic obstructive pulmonary disease) (HCC)     Heart murmur     last assessed-5/4/2012    Laceration of finger     last assessed-2/26/2014    Lipoma     last assessed-5/3/2013    Migraine     last assessed-1/16/2013    Multiple sclerosis ([facility] [address] )     last assessed-11/14/20178684-wahnzndoy-fqphagyaf    Obstructive sleep apnea     last assessed-9/23/2013    Osteoarthritis     last assessed-5/4/2012    Post traumatic stress disorder     last assessed-12/13/2013    Tenosynovitis of thumb     left thumb-septic-last assessed-3/26/2014    Weight gain     last assessed-10/17/2013-50lb in about a year with frequent steroids       FAMILY HISTORY  Family History   Problem Relation Age of Onset    Hepatitis Mother         C    Asthma Father     COPD Father     Diabetes Father     Hypertension Father     Asthma Brother        SOCIAL HISTORY  Social History     Socioeconomic History    Marital status:      Spouse name: None    Number of children: None    Years of education: None    Highest education level: None   Occupational History    None   Tobacco Use    Smoking status: Current Every Day Smoker     Packs/day: 0 25     Years: 25 00     Pack years: 6 25     Types: Cigarettes    Smokeless tobacco: Never Used   Vaping Use    Vaping Use: Never used   Substance and Sexual Activity    Alcohol use: Not Currently    Drug use: No    Sexual activity: Yes   Other Topics Concern    None   Social History Narrative    Daily coffee consumption (1 cups/day)    Disabled    [employer]    No living will    [employer] support     Social Determinants of Health     Financial Resource Strain: Not on file   Food Insecurity: Not on file   Transportation Needs: Not on file   Physical Activity: Not on file   Stress: Not on file   Social Connections: Not on file   Intimate Partner Violence: Not on file   Housing Stability: Not on file       SURGICAL HISTORY  Past Surgical History:   Procedure Laterality Date    FINGER SURGERY      KNEE ARTHROSCOPY W/ MENISCAL REPAIR Right     description: 4/2012-KNEE ARTHROSCOPY W/ MEDIAL MENISCAL REPAIR      Meds/Allergies     CURRENT MEDICATIONS    Current Facility-Administered Medications:     oxyCODONE-acetaminophen (PERCOCET) 5-325 mg per tablet 1 tablet, 1 tablet, Oral, Once, Sedonia Rang, DO    Current Outpatient Medications:     albuterol (2 5 mg/3 mL) 0 083 % nebulizer solution, Take 1 vial (2 5 mg total) by nebulization every 4 (four) hours as needed for wheezing or shortness of breath, Disp: 25 vial, Rfl: 5    baclofen 10 mg tablet, Take 1 tablet (10 mg total) by mouth daily at bedtime as needed for muscle spasms, Disp: 90 tablet, Rfl: 3    Dimethyl Fumarate 120 & 240 MG MISC, Take 120 mg by mouth 2 (two) times a day for 7 days, THEN 240 mg 2 (two) times a day for 23 days  , Disp: 1 each, Rfl: 0    Dimethyl Fumarate 240 MG CPDR, Take 1 capsule (240 mg total) by mouth 2 (two) times a day, Disp: 60 capsule, Rfl: 11    FLUoxetine (PROzac) 10 mg capsule, TAKE 1 CAPSULE BY MOUTH EVERY DAY, Disp: 30 capsule, Rfl: 5    gabapentin (NEURONTIN) 300 mg capsule, Take 1 caps in the morning and noon and 2 caps before bedtime, Disp: 360 capsule, Rfl: 1    loratadine (CLARITIN) 10 mg tablet, TAKE 1 TABLET BY MOUTH EVERY DAY, Disp: 30 tablet, Rfl: 1    mometasone-formoterol (DULERA) 100-5 MCG/ACT inhaler, Inhale 2 puffs 2 (two) times a day Rinse mouth after use , Disp: 13 g, Rfl: 5    SUMAtriptan (IMITREX) 100 mg tablet, TAKE 1 TABLET (100 MG TOTAL) BY MOUTH ONCE AS NEEDED FOR MIGRAINE FOR UP TO 1 DOSE, Disp: 9 tablet, Rfl: 2    Ventolin  (90 Base) MCG/ACT inhaler, Inhale 2 puffs every 4 (four) hours as needed for wheezing, Disp: 18 g, Rfl: 5    zolpidem (AMBIEN) 5 mg tablet, Take 1 tablet (5 mg total) by mouth daily at bedtime as needed for sleep, Disp: 30 tablet, Rfl: 5  (Not in a hospital admission)      ALLERGIES  Allergies   Allergen Reactions    No Known Allergies      Objective     PHYSICAL EXAM    VITAL SIGNS: Blood pressure 117/86, pulse 84, temperature 99 °F (37 2 °C), temperature source Oral, resp  rate 20, SpO2 96 %      Constitutional:  Appears well developed and well nourished, no acute distress, non-toxic appearance   Eyes:  PERRL, EOMI, conjunctivae pink, sclerae non-icteric    HENT:  Normocephalic/Atraumatic, no rhinorrhea, mucous membranes moist   Neck: normal range of motion, no tenderness, supple   Respiratory:  No respiratory distress, normal breath sounds   Cardiovascular:  Normal rate, normal rhythm    GI:  Soft, no tenderness, non-distended  :  No CVAT, no flank ecchymosis  Musculoskeletal:  Left upper extremity:  Tenderness over the left AC joint and the lateral shoulder as well as the left trapezius/shoulder musculature  No bulging of the clavicle  Limited range of motion due to pain  No obvious deformity  Neurovascular intact  Muscle spasm of the left shoulder musculature  Integument:  Pink, warm, dry, Well hydrated, no rash, no erythema, no bullae   Lymphatic:  No cervical/ tonsillar/ submandibular lymphadenopathy noted   Neurologic:  Awake, Alert & oriented x 3, CN 2-12 intact, no focal neurological deficits, motor function intact  Psychiatric:  Speech and behavior appropriate       ED COURSE and MDM:    Assessment/Plan   Assessment:  Pati Blanco is a 63 y o  female presents for evaluation of left shoulder injury  Plan:  X-ray, symptom management, disposition as appropriate  CRITICAL CARE TIME:   0      Portions of the record may have been created with voice recognition software  Occasional wrong word or "sound a like" substitutions may have occurred due to the inherent limitations of voice recognition software       ED Provider  Electronically Signed by

## 2022-03-18 NOTE — ED PROVIDER NOTES
History  Chief Complaint   Patient presents with    Arm Pain     "something popped" while moving mattress 3/18 9 AM; now 9/10 pain, not resolving,tingling through fingers     53-year-old female history of asthma, multiple sclerosis, present due to left shoulder pain  Patient states she was moving her bed around her room which she felt a pop in her left arm and was had severe pain since then has been unable to fully range her arm  States this was around 9:00 a m  Natali Sigala Denies taking any medication prior to arrival   Denies any area of numbness or tingling denies any other area of injury  Prior to Admission Medications   Prescriptions Last Dose Informant Patient Reported? Taking? Dimethyl Fumarate 120 & 240 MG MISC   No No   Sig: Take 120 mg by mouth 2 (two) times a day for 7 days, THEN 240 mg 2 (two) times a day for 23 days     Dimethyl Fumarate 240 MG CPDR   No No   Sig: Take 1 capsule (240 mg total) by mouth 2 (two) times a day   FLUoxetine (PROzac) 10 mg capsule   No No   Sig: TAKE 1 CAPSULE BY MOUTH EVERY DAY   SUMAtriptan (IMITREX) 100 mg tablet   No No   Sig: TAKE 1 TABLET (100 MG TOTAL) BY MOUTH ONCE AS NEEDED FOR MIGRAINE FOR UP TO 1 DOSE   Ventolin  (90 Base) MCG/ACT inhaler   No No   Sig: Inhale 2 puffs every 4 (four) hours as needed for wheezing   albuterol (2 5 mg/3 mL) 0 083 % nebulizer solution  Self No No   Sig: Take 1 vial (2 5 mg total) by nebulization every 4 (four) hours as needed for wheezing or shortness of breath   baclofen 10 mg tablet   No No   Sig: Take 1 tablet (10 mg total) by mouth daily at bedtime as needed for muscle spasms   gabapentin (NEURONTIN) 300 mg capsule   No No   Sig: Take 1 caps in the morning and noon and 2 caps before bedtime   loratadine (CLARITIN) 10 mg tablet   No No   Sig: TAKE 1 TABLET BY MOUTH EVERY DAY   mometasone-formoterol (DULERA) 100-5 MCG/ACT inhaler   No No   Sig: Inhale 2 puffs 2 (two) times a day Rinse mouth after use    zolpidem (AMBIEN) 5 mg tablet   No No   Sig: Take 1 tablet (5 mg total) by mouth daily at bedtime as needed for sleep      Facility-Administered Medications: None       Past Medical History:   Diagnosis Date    Asthma     Asthma with COPD ([address] )     last assessed-9/1/2017    Closed fracture of fourth lumbar vertebra ([address] )     unspecified fracture morphology, initial yyheyajhw-olueyqki-6/31/2017    [facility])     Heart murmur     last assessed-5/4/2012    Laceration of finger     last assessed-2/26/2014    Lipoma     last assessed-5/3/2013    Migraine     last assessed-1/16/2013    Multiple sclerosis ([address] )     last assessed-11/14/[id number]-[id number]-rqzvcgfci    Obstructive sleep apnea     last assessed-9/23/2013    Osteoarthritis     last assessed-5/4/2012    Post traumatic stress disorder     last assessed-12/13/2013    Tenosynovitis of thumb     left thumb-septic-last assessed-3/26/2014    Weight gain     last assessed-10/17/2013-50lb in about a year with frequent steroids       Past Surgical History:   Procedure Laterality Date    FINGER SURGERY      KNEE ARTHROSCOPY W/ MENISCAL REPAIR Right     description: 4/2012-KNEE ARTHROSCOPY W/ MEDIAL MENISCAL REPAIR        Family History   Problem Relation Age of Onset    Hepatitis Mother         C    Asthma Father     COPD Father     Diabetes Father     Hypertension Father     Asthma Brother      I have reviewed and agree with the history as documented  E-Cigarette/Vaping    E-Cigarette Use Never User      E-Cigarette/Vaping Substances     Social History     Tobacco Use    Smoking status: Current Every Day Smoker     Packs/day: 0 25     Years: 25 00     Pack years: 6 25     Types: Cigarettes    Smokeless tobacco: Never Used   Vaping Use    Vaping Use: Never used   Substance Use Topics    Alcohol use: Not Currently    Drug use: No        Review of Systems   Constitutional: Negative for fatigue and fever     Musculoskeletal: Positive for arthralgias and myalgias  Negative for neck pain  Skin: Negative for color change and wound  Physical Exam  ED Triage Vitals   Temperature Pulse Respirations Blood Pressure SpO2   03/18/22 1321 03/18/22 1321 03/18/22 1321 03/18/22 1321 03/18/22 1321   99 °F (37 2 °C) 84 20 117/86 96 %      Temp Source Heart Rate Source Patient Position - Orthostatic VS BP Location FiO2 (%)   03/18/22 1321 03/18/22 1321 03/18/22 1321 03/18/22 1321 --   Oral Monitor Sitting Right arm       Pain Score       03/18/22 1500       9             Orthostatic Vital Signs  Vitals:    03/18/22 1321   BP: 117/86   Pulse: 84   Patient Position - Orthostatic VS: Sitting       Physical Exam  Vitals and nursing note reviewed  Constitutional:       General: She is not in acute distress  Appearance: She is well-developed  She is not diaphoretic  HENT:      Head: Normocephalic and atraumatic  Right Ear: External ear normal       Left Ear: External ear normal    Eyes:      General: No scleral icterus  Right eye: No discharge  Left eye: No discharge  Conjunctiva/sclera: Conjunctivae normal    Neck:      Vascular: No JVD  Trachea: No tracheal deviation  Cardiovascular:      Rate and Rhythm: Normal rate and regular rhythm  Heart sounds: Normal heart sounds  Pulmonary:      Effort: Pulmonary effort is normal  No respiratory distress  Breath sounds: Normal breath sounds  No stridor  No wheezing or rales  Abdominal:      General: There is no distension  Musculoskeletal:         General: Tenderness present  No deformity  Cervical back: Normal range of motion  Comments: Unable to fully flex left shoulder due to pain  Pain over AC joint  Sensation intact motor intact  Distal pulses intact   Skin:     General: Skin is warm  Findings: No erythema or rash  Neurological:      Mental Status: She is alert and oriented to person, place, and time        Motor: No abnormal muscle tone  Psychiatric:         Mood and Affect: Mood normal          Behavior: Behavior normal          Thought Content: Thought content normal          ED Medications  Medications   ketorolac (TORADOL) injection 15 mg (15 mg Intramuscular Given 3/18/22 1409)   oxyCODONE-acetaminophen (PERCOCET) 5-325 mg per tablet 1 tablet (1 tablet Oral Given 3/18/22 1500)       Diagnostic Studies  Results Reviewed     None                 XR chest 1 view portable   Final Result by Glenna Carroll MD (03/18 1509)      No acute cardiopulmonary disease  Workstation performed: [id number]         XR shoulder 2+ views LEFT   Final Result by Elaine Schneider MD (03/18 0208)      No acute osseous abnormality  Workstation performed: [id number]               Procedures  Procedures      ED Course                             SBIRT 20yo+      Most Recent Value   SBIRT (24 yo +)    In order to provide better care to our patients, we are screening all of our patients for alcohol and drug use  Would it be okay to ask you these screening questions? Yes Filed at: 03/18/2022 1419   Initial Alcohol Screen: US AUDIT-C     1  How often do you have a drink containing alcohol? 0 Filed at: 03/18/2022 1419   2  How many drinks containing alcohol do you have on a typical day you are drinking? 0 Filed at: 03/18/2022 1419   3a  Male UNDER 65: How often do you have five or more drinks on one occasion? 0 Filed at: 03/18/2022 1419   3b  FEMALE Any Age, or MALE 65+: How often do you have 4 or more drinks on one occassion? 0 Filed at: 03/18/2022 1419   Audit-C Score 0 Filed at: 03/18/2022 1419   VANI: How many times in the past year have you    Used an illegal drug or used a prescription medication for non-medical reasons? Never Filed at: 03/18/2022 1419                MDM  Number of Diagnoses or Management Options  Acute pain of left shoulder  Left shoulder pain  Diagnosis management comments: No acute findings on x-ray  Possible AC joint injury versus rotator cuff  Placed in sling for comfort and will follow-up with orthopedics as outpatient  Return precautions advised      Disposition  Final diagnoses:   Left shoulder pain   Acute pain of left shoulder     Time reflects when diagnosis was documented in both MDM as applicable and the Disposition within this note     Time User Action Codes Description Comment    3/18/2022  2:58 PM Marsha Jessenia Add [M25 512] Left shoulder pain     3/18/2022  3:00 PM Oly Cottrell Add [M25 512] Acute pain of left shoulder       ED Disposition     ED Disposition Condition Date/Time Comment    Discharge Stable Fri Mar 18, 2022  2:58 PM Jose Bruanse discharge to home/self care  Follow-up Information     Follow up With Specialties Details Why Contact Info Additional Information     [facility]   [facility] [phone]  [address] [place] [address] Specialists [address], [address]  [facility]          Discharge Medication List as of 3/18/2022  2:59 PM      CONTINUE these medications which have NOT CHANGED    Details   albuterol (2 5 mg/3 mL) 0 083 % nebulizer solution Take 1 vial (2 5 mg total) by nebulization every 4 (four) hours as needed for wheezing or shortness of breath, Starting Fri 10/2/2020, Normal      baclofen 10 mg tablet Take 1 tablet (10 mg total) by mouth daily at bedtime as needed for muscle spasms, Starting Wed 7/28/2021, Normal      Dimethyl Fumarate 120 & 240 MG MISC Multiple Dosages:Starting Thu 1/20/2022, Until Wed 1/26/2022 at 2359, THEN Starting Thu 1/27/2022, Until Fri 2/18/2022 at 2359Take 120 mg by mouth 2 (two) times a day for 7 days, THEN 240 mg 2 (two) times a day for 23 days  , Normal      Dimethyl Fumarate 240 MG CPDR Take 1 capsule (240 mg total) by mouth 2 (two) times a day, Starting Thu 1/20/2022, Normal      FLUoxetine (PROzac) 10 mg capsule TAKE 1 CAPSULE BY MOUTH EVERY DAY, Normal      gabapentin (NEURONTIN) 300 mg capsule Take 1 caps in the morning and noon and 2 caps before bedtime, Normal      loratadine (CLARITIN) 10 mg tablet TAKE 1 TABLET BY MOUTH EVERY DAY, Normal      mometasone-formoterol (DULERA) 100-5 MCG/ACT inhaler Inhale 2 puffs 2 (two) times a day Rinse mouth after use , Starting u 3/3/2022, Normal      SUMAtriptan (IMITREX) 100 mg tablet TAKE 1 TABLET (100 MG TOTAL) BY MOUTH ONCE AS NEEDED FOR MIGRAINE FOR UP TO 1 DOSE, Starting Fri 8/6/2021, Normal      Ventolin  (90 Base) MCG/ACT inhaler Inhale 2 puffs every 4 (four) hours as needed for wheezing, Starting Thu 3/3/2022, Normal      zolpidem (AMBIEN) 5 mg tablet Take 1 tablet (5 mg total) by mouth daily at bedtime as needed for sleep, Starting Thu 1/6/2022, Normal           No discharge procedures on file  PDMP Review     None           ED Provider  Attending physically available and evaluated Yudi Moreno I managed the patient along with the ED Attending      Electronically Signed by         Evi Lazaro DO  03/18/22 0243

## 2022-03-25 NOTE — TELEPHONE ENCOUNTER
Called patient to provide below recommendations  States, "it's a good thing you called because it's happening again "  States she can't feel her lips or tongue  Advised patient to be evaluated in ER or urgent care  States she just saw her pulmonary doctor who told her she has a heart condition  States she will have her son walk her to the ER because she does not have a car and it's close to her home  Advised her that if she is short of breath that she should call an ambulance  Patient states she is fine to walk as it is only a couple of blocks  Reiterated to patient that she should be taken by car  Patient will provide update  HPI: Melissa is a 3 month old female with a past medical history of pulmonic stenosis (s/p valvuloplasty on 12/21, followed by Dr. Lizarraga) who presented to the ED after an episode that was concerning for seizure like activity. Per mother, Melissa had been having fevers on 3/24. She then had an episode of bilateral shaking of her upper and lower extremities as well as deviation of her eyes to the right. Per mother,     ED Course (3/24): Pt was febrile to 39.6 with a heart rate of 223. Physical exam unremarkable except for tachycardia. EKG showed sinus tachycardia. CBC unremarkable. Initial glucose 59. CMP pertinent for potassium of 5.7 - not hemolyzed. Cathed UA pertinent for large leukocyte esterase and occasional bacteria.  RVP pertinent for OC43 Coronavirus. Lumbar puncture unremarkable with negative CSF gram stain. Blood culture, CSF acid fast, PCR and lactate dehydrogenase were sent.     Pav 3 Course (3/25 - ): Pt arrived stable to the floor. vEEG showed no signs of seizure activity, but there was persistent focal slowing. Pt will follow up with neurology in one month. Given persistent focal slowing findings, pt also had a head ultrasound which was unremrarkable.  Blood culture showed NGTD. Renal US showed***. Pt remaained stable throughout hospital admission. Was discharged on ******    On the day of discharge, the patient continued to tolerate PO intake with adequate UOP.  Vital signs were reviewed and remained WNL.  The child remained well-appearing, with no concerning findings noted on physical exam and no respiratory distress.  The care plan was reviewed with caregivers, who were in agreement and endorsed understanding.  The patient is deemed stable for discharge home with anticipatory guidance regarding when to return to the hospital and instructions for PMD follow-up in great detail.  There are no outstanding issues or concerns noted.    Discharge Vitals    Discharge Physical Exam     HPI: Melissa is a 3 month old female with a past medical history of pulmonic stenosis (s/p valvuloplasty on 12/21, followed by Dr. Lizarraga) who presented to the ED after an episode that was concerning for seizure like activity. Per mother, Melissa had been having fevers on 3/24. She then had an episode of bilateral shaking of her upper and lower extremities as well as deviation of her eyes to the right. Per mother,     ED Course (3/24): Pt was febrile to 39.6 with a heart rate of 223. Physical exam unremarkable except for tachycardia. EKG showed sinus tachycardia. CBC unremarkable. Initial glucose 59. CMP pertinent for potassium of 5.7 - not hemolyzed. Cathed UA pertinent for large leukocyte esterase and occasional bacteria.  RVP pertinent for OC43 Coronavirus. Lumbar puncture unremarkable with negative CSF gram stain. Blood culture, CSF acid fast, PCR and lactate dehydrogenase were sent.     Pav 3 Course (3/25 - ): Pt arrived stable to the floor. vEEG showed no signs of seizure activity, but there was persistent focal slowing. Pt will follow up with neurology in one month. Given persistent focal slowing findings, pt also had a head ultrasound which was unremrarkable.  Blood culture showed NGTD. Renal US showed***. Pt remaained stable throughout hospital admission. Was discharged on ******    On the day of discharge, the patient continued to tolerate PO intake with adequate UOP.  Vital signs were reviewed and remained WNL.  The child remained well-appearing, with no concerning findings noted on physical exam and no respiratory distress.  The care plan was reviewed with caregivers, who were in agreement and endorsed understanding.  The patient is deemed stable for discharge home with anticipatory guidance regarding when to return to the hospital and instructions for PMD follow-up in great detail.  There are no outstanding issues or concerns noted.    Discharge Vitals    Discharge Physical Exam  Gen: Alert, NAD  HEENT: EOMI, PERRLA. MMM.  CV: RRR, S1, S2 nl. No m/r/g  Pulm: CTAB/L  Abd: Soft, NT/ND. +BS  Ext: FROM x4. Peripheral pulses 2+     HPI: Melissa is a 3 month old female with a past medical history of pulmonic stenosis (s/p valvuloplasty on 12/21, followed by Dr. Lizarraga) who presented to the ED after an episode that was concerning for seizure like activity. Per mother, Melissa had been having fevers on 3/24. She then had an episode of bilateral shaking of her upper and lower extremities as well as deviation of her eyes to the right. Per mother,     ED Course (3/24): Pt was febrile to 39.6 with a heart rate of 223. Physical exam unremarkable except for tachycardia. EKG showed sinus tachycardia. CBC unremarkable. Initial glucose 59. CMP pertinent for potassium of 5.7 - not hemolyzed. Cathed UA pertinent for large leukocyte esterase and occasional bacteria.  RVP pertinent for OC43 Coronavirus. Lumbar puncture unremarkable with negative CSF gram stain. Blood culture, CSF acid fast, PCR and lactate dehydrogenase were sent.     Pav 3 Course (3/25 - 3/26): Pt arrived stable to the floor. vEEG showed no signs of seizure activity, but there was persistent focal slowing. Pt will follow up with neurology in one month. Given persistent focal slowing findings, pt also had a head ultrasound which was unremrarkable.  Blood culture showed NGTD. Renal US was normal. Pt remaained stable throughout hospital admission. Was discharged on keflex for a total 7d course. Urine culture was misplaced and therefore we do not have exact speciation for the UTI. If febrile, may need repeat UA/UCx and broad abx.     On the day of discharge, the patient continued to tolerate PO intake with adequate UOP.  Vital signs were reviewed and remained WNL.  The child remained well-appearing, with no concerning findings noted on physical exam and no respiratory distress.  The care plan was reviewed with caregivers, who were in agreement and endorsed understanding.  The patient is deemed stable for discharge home with anticipatory guidance regarding when to return to the hospital and instructions for PMD follow-up in great detail.  There are no outstanding issues or concerns noted.    Discharge Vitals  Vital Signs Last 24 Hrs  T(C): 36.4 (26 Mar 2022 10:21), Max: 36.9 (25 Mar 2022 21:05)  T(F): 97.5 (26 Mar 2022 10:21), Max: 98.4 (25 Mar 2022 21:05)  HR: 144 (26 Mar 2022 10:21) (128 - 160)  BP: 95/63 (26 Mar 2022 10:21) (87/49 - 99/52)  RR: 40 (26 Mar 2022 10:21) (38 - 40)  SpO2: 100% (26 Mar 2022 10:21) (96% - 100%)    Discharge Physical Exam  Gen: Alert, NAD  HEENT: EOMI, PERRLA. MMM.  CV: RRR, S1, S2 nl. No m/r/g  Pulm: CTAB/L  Abd: Soft, NT/ND. +BS  Ext: FROM x4. Peripheral pulses 2+     HPI: Melissa is a 3 month old female with a past medical history of pulmonic stenosis (s/p valvuloplasty on 12/21, followed by Dr. Lizarraga) who presented to the ED after an episode that was concerning for seizure like activity. Per mother, Melissa had been having fevers on 3/24. She then had an episode of bilateral shaking of her upper and lower extremities as well as deviation of her eyes to the right. Per mother,     ED Course (3/24): Pt was febrile to 39.6 with a heart rate of 223. Physical exam unremarkable except for tachycardia. EKG showed sinus tachycardia. CBC unremarkable. Initial glucose 59. CMP pertinent for potassium of 5.7 - not hemolyzed. Cathed UA pertinent for large leukocyte esterase and occasional bacteria.  RVP pertinent for OC43 Coronavirus. Lumbar puncture unremarkable with negative CSF gram stain. Blood culture, CSF acid fast, PCR and lactate dehydrogenase were sent.     Pav 3 Course (3/25 - 3/26): Pt arrived stable to the floor. vEEG showed no signs of seizure activity, but there was persistent focal slowing. Pt will follow up with neurology in one month. Given persistent focal slowing findings, pt also had a head ultrasound which was unremrarkable.  Blood culture showed NGTD. Renal US was normal. Pt remaained stable throughout hospital admission. Was discharged on keflex for a total 7d course. Urine culture was misplaced and therefore we do not have exact speciation for the UTI. If febrile, may need repeat UA/UCx and broad abx.     On the day of discharge, the patient continued to tolerate PO intake with adequate UOP.  Vital signs were reviewed and remained WNL.  The child remained well-appearing, with no concerning findings noted on physical exam and no respiratory distress.  The care plan was reviewed with caregivers, who were in agreement and endorsed understanding.  The patient is deemed stable for discharge home with anticipatory guidance regarding when to return to the hospital and instructions for PMD follow-up in great detail.  There are no outstanding issues or concerns noted.    Discharge Vitals  Vital Signs Last 24 Hrs  T(C): 36.4 (26 Mar 2022 10:21), Max: 36.9 (25 Mar 2022 21:05)  T(F): 97.5 (26 Mar 2022 10:21), Max: 98.4 (25 Mar 2022 21:05)  HR: 144 (26 Mar 2022 10:21) (128 - 160)  BP: 95/63 (26 Mar 2022 10:21) (87/49 - 99/52)  RR: 40 (26 Mar 2022 10:21) (38 - 40)  SpO2: 100% (26 Mar 2022 10:21) (96% - 100%)    Discharge Physical Exam  Gen: Alert, NAD  HEENT: EOMI, PERRLA. MMM.  CV: RRR, S1, S2 nl. No m/r/g  Pulm: CTAB/L  Abd: Soft, NT/ND. +BS  Ext: FROM x4. Peripheral pulses 2+    Attending attestation: I have read and agree with this PGY-1 Discharge Note. This is a 3mFemale, admitted with febrile seizure 2/2 UTI. Underwent full septic work up. Was seen by neurology, vEEG which was negative for seizure activity. was empirically treated for UTI x 7 day course. will f/u with neuro as outpatient.     I was physically present for the evaluation and management services provided. I agree with the included history, physical, and plan which I reviewed and edited where appropriate. I spent 35 minutes with the patient and the patient's family on direct patient care and discharge planning with more than 50% of the visit spent on counseling and/or coordination of care.     Attending exam at 11am:   Gen: no apparent distress, appears comfortable  HEENT: normocephalic/atraumatic, moist mucous membranes, AFOF clear conjunctiva  Neck: supple  Heart: S1S2+, regular rate and rhythm, no murmur, cap refill < 2 sec,  Lungs: normal respiratory pattern, clear to auscultation bilaterally  Abd: soft, nontender, nondistended, bowel sounds present, no hepatosplenomegaly  : female genitalia  Ext: full range of motion, no edema, no tenderness  Neuro: no focal deficits, awake, alert, no acute change from baseline exam  Skin: no rash, intact and not indurated      Betty Mendoza MD  Pediatric Hospitalist  [phone]

## 2022-04-06 ENCOUNTER — OFFICE VISIT (OUTPATIENT)
Dept: FAMILY MEDICINE CLINIC | Facility: CLINIC | Age: 62
End: 2022-04-06
Payer: COMMERCIAL

## 2022-04-06 ENCOUNTER — TELEPHONE (OUTPATIENT)
Dept: OTHER | Facility: OTHER | Age: 62
End: 2022-04-06

## 2022-04-06 ENCOUNTER — VBI (OUTPATIENT)
Dept: ADMINISTRATIVE | Facility: OTHER | Age: 62
End: 2022-04-06

## 2022-04-06 VITALS
TEMPERATURE: 97.3 F | OXYGEN SATURATION: 96 % | DIASTOLIC BLOOD PRESSURE: 70 MMHG | HEIGHT: 60 IN | WEIGHT: 194 LBS | HEART RATE: 77 BPM | SYSTOLIC BLOOD PRESSURE: 120 MMHG | BODY MASS INDEX: 38.09 KG/M2 | RESPIRATION RATE: 16 BRPM

## 2022-04-06 DIAGNOSIS — J44.9 ASTHMA WITH COPD (HCC): Primary | ICD-10-CM

## 2022-04-06 PROCEDURE — 99213 OFFICE O/P EST LOW 20 MIN: CPT | Performed by: FAMILY MEDICINE

## 2022-04-06 RX ORDER — CETIRIZINE HYDROCHLORIDE 10 MG/1
10 TABLET ORAL DAILY
Qty: 30 TABLET | Refills: 2 | Status: SHIPPED | OUTPATIENT
Start: 2022-04-06 | End: 2022-06-22

## 2022-04-06 NOTE — PROGRESS NOTES
Assessment/Plan:    Asthma with COPD ([facility] )  Patient has asthma COPD  A currently on Ventolin inhaler as needed and Dulera daily  States that she is coming down with more rhinorrhea and postnasal drip  Discussed with patient this is either allergies verses viral infection  The lungs do sound clear today  Asthma is well controlled  Recommend switching to different allergy medication  Will discontinue Claritin and start Zyrtec 10 milligrams daily instead  Advised patient that if symptoms get worse, to return to office and will start singular 10 milligrams at bedtime  Follow-up as needed       Diagnoses and all orders for this visit:    Asthma with COPD ([facility] )  -     cetirizine (ZyrTEC) 10 mg tablet; Take 1 tablet (10 mg total) by mouth daily          Subjective:   Chief Complaint   Patient presents with    Cough    Nasal Congestion    Itchy throat     Health Maintenance   Topic Date Due    Hepatitis C Screening  Never done    COVID-19 Vaccine (1) Never done    HIV Screening  Never done    Cervical Cancer Screening  Never done    Breast Cancer Screening: Mammogram  Never done    Annual Physical  03/29/2019    BMI: Followup Plan  08/09/2022    BMI: Adult  04/06/2023    DTaP,Tdap,and Td Vaccines (2 - Td or Tdap) 02/03/2024    Colorectal Cancer Screening  11/13/2027    Pneumococcal Vaccine: Pediatrics (0 to 5 Years) and At-Risk Patients (6 to 59 Years) (2 of 2 - PPSV23) 11/13/2027    Influenza Vaccine  Completed    HIB Vaccine  Aged Out    Hepatitis B Vaccine  Aged Out    IPV Vaccine  Aged Out    Hepatitis A Vaccine  Aged Out    Meningococcal ACWY Vaccine  Aged Out    HPV Vaccine  Aged Out        Patient ID: Castillo Beatty is a 63 y o  female  HPI    Patient c/o feeling under the weather  Slight cough, rhinorrhea, clear mucus  No chest congestion  Does have some baseline shortness of breath from asthma COPD  No fever, congestion, N/V/D, body aches, or rash      No OTC medications  Does take her a allergy medications as needed, Claritin and Dulera  Lives at home  On disability  The following portions of the patient's history were reviewed and updated as appropriate: allergies, current medications, past family history, past medical history, past social history, past surgical history, and problem list     Review of Systems   Constitutional: Negative for chills and fever  HENT: Positive for postnasal drip and rhinorrhea  Negative for congestion and sore throat  Respiratory: Positive for cough and shortness of breath  Negative for wheezing  Cardiovascular: Negative for chest pain and palpitations  Gastrointestinal: Negative for diarrhea, nausea and vomiting  Genitourinary: Negative for dysuria  Musculoskeletal: Negative for myalgias  Skin: Negative for rash  Neurological: Negative for dizziness and headaches  Objective:  /70 (BP Location: Left arm, Patient Position: Sitting, Cuff Size: Adult)   Pulse 77   Temp (!) 97 3 °F (36 3 °C) (Tympanic)   Resp 16   Ht 5' (1 524 m)   Wt 88 kg (194 lb)   SpO2 96%   BMI 37 89 kg/m²      Physical Exam  Vitals and nursing note reviewed  Constitutional:       General: She is not in acute distress  HENT:      Head: Normocephalic and atraumatic  Right Ear: Tympanic membrane normal       Left Ear: Tympanic membrane normal       Nose: Nose normal       Mouth/Throat:      Mouth: Mucous membranes are moist       Pharynx: Oropharynx is clear  Eyes:      Conjunctiva/sclera: Conjunctivae normal    Cardiovascular:      Rate and Rhythm: Normal rate and regular rhythm  Pulses: Normal pulses  Heart sounds: No murmur heard  Pulmonary:      Effort: Pulmonary effort is normal  No respiratory distress  Breath sounds: No wheezing, rhonchi or rales  Lymphadenopathy:      Cervical: No cervical adenopathy  Neurological:      Mental Status: She is alert                 This note has been constructed using a voice recognition system  There may be translation, syntax, or grammatical errors  If you have an questions, please contact the dictating provider

## 2022-04-11 ENCOUNTER — OFFICE VISIT (OUTPATIENT)
Dept: FAMILY MEDICINE CLINIC | Facility: CLINIC | Age: 62
End: 2022-04-11
Payer: COMMERCIAL

## 2022-04-11 VITALS
SYSTOLIC BLOOD PRESSURE: 108 MMHG | RESPIRATION RATE: 16 BRPM | TEMPERATURE: 98 F | BODY MASS INDEX: 36.71 KG/M2 | OXYGEN SATURATION: 97 % | DIASTOLIC BLOOD PRESSURE: 80 MMHG | HEART RATE: 104 BPM | HEIGHT: 60 IN | WEIGHT: 187 LBS

## 2022-04-11 DIAGNOSIS — J44.1 COPD EXACERBATION (HCC): Primary | ICD-10-CM

## 2022-04-11 PROCEDURE — 99214 OFFICE O/P EST MOD 30 MIN: CPT | Performed by: FAMILY MEDICINE

## 2022-04-11 RX ORDER — PREDNISONE 20 MG/1
40 TABLET ORAL DAILY
Qty: 10 TABLET | Refills: 0 | Status: SHIPPED | OUTPATIENT
Start: 2022-04-11 | End: 2022-04-16

## 2022-04-11 RX ORDER — BROMPHENIRAMINE MALEATE, PSEUDOEPHEDRINE HYDROCHLORIDE, AND DEXTROMETHORPHAN HYDROBROMIDE 2; 30; 10 MG/5ML; MG/5ML; MG/5ML
10 SYRUP ORAL 4 TIMES DAILY PRN
Qty: 180 ML | Refills: 0 | Status: SHIPPED | OUTPATIENT
Start: 2022-04-11

## 2022-04-11 RX ORDER — AZITHROMYCIN 250 MG/1
TABLET, FILM COATED ORAL
Qty: 6 TABLET | Refills: 0 | Status: SHIPPED | OUTPATIENT
Start: 2022-04-11 | End: 2022-04-16

## 2022-04-11 NOTE — PROGRESS NOTES
Assessment/Plan:    No problem-specific Assessment & Plan notes found for this encounter  Diagnoses and all orders for this visit:    COPD exacerbation ([place] [address] )  -     predniSONE 20 mg tablet; Take 2 tablets (40 mg total) by mouth daily for 5 days  -     azithromycin (Zithromax) 250 mg tablet; Take 2 tablets (500 mg total) by mouth daily for 1 day, THEN 1 tablet (250 mg total) daily for 4 days  -     brompheniramine-pseudoephedrine-DM 30-2-10 MG/5ML syrup; Take 10 mL by mouth 4 (four) times a day as needed for allergies          Subjective: wheezing      Patient ID: Patricia Lino is a 63 y o  female  HPI  Pt was here a couple days ago and was diagnosed with allergies  Symptoms started 4 days ago which congestion and cough  She was switched for calritin to zyrtec  Symptoms have worsened  Denies fevers, chills, body aches, nausea, vomiting  Reports a sore throat from coughing and wheezing  No sick contacts  She isn't using medication for the cough  She is using ventolin        The following portions of the patient's history were reviewed and updated as appropriate:   She   Patient Active Problem List    Diagnosis Date Noted    Bipolar 1 disorder ([place] [address] ) 08/23/2018    Chronic pain syndrome 05/10/2018    Degenerative lumbar spinal stenosis 11/14/2017    Anxiety and depression 11/14/2017    Elevated LDL cholesterol level 09/01/2017    PTSD (post-traumatic stress disorder) 07/31/2017    Chronic pain disorder 07/31/2017    Multiple sclerosis, relapsing-remitting ([place] [address] ) 03/16/2017    Migraine 03/16/2017    Vitamin D deficiency 12/12/2016    Asthma with COPD ([place] [address] ) 01/08/2015    Fibromyalgia 05/04/2012     She  has a past surgical history that includes Knee arthroscopy w/ meniscal repair (Right) and Finger surgery  Her family history includes Asthma in her brother and father; COPD in her father; Diabetes in her father; Hepatitis in her mother; Hypertension in her father    She  reports that she has been smoking cigarettes  She has a 6 25 pack-year smoking history  She has never used smokeless tobacco  She reports previous alcohol use  She reports that she does not use drugs  Current Outpatient Medications   Medication Sig Dispense Refill    albuterol (2 5 mg/3 mL) 0 083 % nebulizer solution Take 1 vial (2 5 mg total) by nebulization every 4 (four) hours as needed for wheezing or shortness of breath 25 vial 5    baclofen 10 mg tablet Take 1 tablet (10 mg total) by mouth daily at bedtime as needed for muscle spasms 90 tablet 3    cetirizine (ZyrTEC) 10 mg tablet Take 1 tablet (10 mg total) by mouth daily 30 tablet 2    Dimethyl Fumarate 240 MG CPDR Take 1 capsule (240 mg total) by mouth 2 (two) times a day 60 capsule 11    FLUoxetine (PROzac) 10 mg capsule TAKE 1 CAPSULE BY MOUTH EVERY DAY 30 capsule 5    gabapentin (NEURONTIN) 300 mg capsule Take 1 caps in the morning and noon and 2 caps before bedtime 360 capsule 1    mometasone-formoterol (DULERA) 100-5 MCG/ACT inhaler Inhale 2 puffs 2 (two) times a day Rinse mouth after use  13 g 5    SUMAtriptan (IMITREX) 100 mg tablet TAKE 1 TABLET (100 MG TOTAL) BY MOUTH ONCE AS NEEDED FOR MIGRAINE FOR UP TO 1 DOSE 9 tablet 2    Ventolin  (90 Base) MCG/ACT inhaler Inhale 2 puffs every 4 (four) hours as needed for wheezing 18 g 5    zolpidem (AMBIEN) 5 mg tablet Take 1 tablet (5 mg total) by mouth daily at bedtime as needed for sleep 30 tablet 5    azithromycin (Zithromax) 250 mg tablet Take 2 tablets (500 mg total) by mouth daily for 1 day, THEN 1 tablet (250 mg total) daily for 4 days  6 tablet 0    brompheniramine-pseudoephedrine-DM 30-2-10 MG/5ML syrup Take 10 mL by mouth 4 (four) times a day as needed for allergies 180 mL 0    Dimethyl Fumarate 120 & 240 MG MISC Take 120 mg by mouth 2 (two) times a day for 7 days, THEN 240 mg 2 (two) times a day for 23 days   1 each 0    oxyCODONE-acetaminophen (Percocet) 5-325 mg per tablet Take 1 tablet by mouth every 4 (four) hours as needed for moderate pain for up to 6 doses Max Daily Amount: 6 tablets (Patient not taking: Reported on 4/6/2022 ) 6 tablet 0    predniSONE 20 mg tablet Take 2 tablets (40 mg total) by mouth daily for 5 days 10 tablet 0     No current facility-administered medications for this visit       Review of Systems   Constitutional: Negative for fever and unexpected weight change  HENT: Positive for congestion  Negative for ear pain, sore throat and trouble swallowing  Eyes: Negative for pain and visual disturbance  Respiratory: Positive for cough, chest tightness, shortness of breath and wheezing  Cardiovascular: Negative for chest pain  Gastrointestinal: Negative for abdominal distention, abdominal pain, blood in stool, constipation, diarrhea, nausea and vomiting  Endocrine: Negative for polydipsia and polyuria  Genitourinary: Negative for dysuria and hematuria  Musculoskeletal: Negative for back pain and myalgias  Skin: Negative for rash  Neurological: Negative for syncope and headaches  Psychiatric/Behavioral: Negative for suicidal ideas  PHQ-2/9 Depression Screening             Objective:      /80 (BP Location: Left arm, Patient Position: Sitting, Cuff Size: Adult)   Pulse 104   Temp 98 °F (36 7 °C) (Tympanic)   Resp 16   Ht 5' (1 524 m)   Wt 84 8 kg (187 lb)   SpO2 97%   BMI 36 52 kg/m²          Physical Exam  Constitutional:       Appearance: She is well-developed  HENT:      Head: Normocephalic and atraumatic  Right Ear: External ear normal       Left Ear: External ear normal       Mouth/Throat:      Pharynx: No oropharyngeal exudate  Eyes:      General: No scleral icterus  Conjunctiva/sclera: Conjunctivae normal       Pupils: Pupils are equal, round, and reactive to light  Cardiovascular:      Rate and Rhythm: Normal rate and regular rhythm  Heart sounds: No murmur heard  No friction rub  No gallop      Pulmonary: Effort: Pulmonary effort is normal  No respiratory distress  Breath sounds: Normal breath sounds  No wheezing or rales  Abdominal:      General: Bowel sounds are normal  There is no distension  Palpations: Abdomen is soft  There is no mass  Tenderness: There is no abdominal tenderness  There is no rebound  Musculoskeletal:         General: Normal range of motion  Cervical back: Normal range of motion and neck supple  Skin:     General: Skin is warm and dry  Neurological:      Mental Status: She is alert and oriented to person, place, and time

## 2022-05-02 ENCOUNTER — VBI (OUTPATIENT)
Dept: ADMINISTRATIVE | Facility: OTHER | Age: 62
End: 2022-05-02

## 2022-05-10 DIAGNOSIS — G35 MULTIPLE SCLEROSIS, RELAPSING-REMITTING (HCC): ICD-10-CM

## 2022-05-10 DIAGNOSIS — M79.2 NEUROPATHIC PAIN: ICD-10-CM

## 2022-05-11 ENCOUNTER — OFFICE VISIT (OUTPATIENT)
Dept: FAMILY MEDICINE CLINIC | Facility: CLINIC | Age: 62
End: 2022-05-11
Payer: COMMERCIAL

## 2022-05-11 VITALS
HEIGHT: 60 IN | OXYGEN SATURATION: 98 % | DIASTOLIC BLOOD PRESSURE: 89 MMHG | TEMPERATURE: 99.4 F | RESPIRATION RATE: 18 BRPM | SYSTOLIC BLOOD PRESSURE: 120 MMHG | WEIGHT: 187.4 LBS | HEART RATE: 90 BPM | BODY MASS INDEX: 36.79 KG/M2

## 2022-05-11 DIAGNOSIS — R59.1 LYMPHADENOPATHY OF HEAD AND NECK: Primary | ICD-10-CM

## 2022-05-11 LAB — S PYO AG THROAT QL: NEGATIVE

## 2022-05-11 PROCEDURE — 87880 STREP A ASSAY W/OPTIC: CPT | Performed by: FAMILY MEDICINE

## 2022-05-11 PROCEDURE — 99213 OFFICE O/P EST LOW 20 MIN: CPT | Performed by: FAMILY MEDICINE

## 2022-05-11 RX ORDER — GABAPENTIN 300 MG/1
CAPSULE ORAL
Qty: 120 CAPSULE | Refills: 11 | Status: SHIPPED | OUTPATIENT
Start: 2022-05-11

## 2022-05-11 NOTE — PROGRESS NOTES
Assessment/Plan:    No problem-specific Assessment & Plan notes found for this encounter  Diagnoses and all orders for this visit:    Lymphadenopathy of head and neck  Comments:  Most likely viral   Rapid strep negative today  Reassured patient that tender lymphadenopathy leisurely self-resolved  If symptoms worsen pt will return  Orders:  -     POCT rapid strepA        Subjective: swollen lymph node      Patient ID: Olivier Herrera is a 63 y o  female  HPI  Here for a swollen gland of the left neck that started 3 days ago  The gland is tender to the touch  Denies a sore throat  She is experiencing sneezing, coughing, runny nose which has been present for at least a month and hasn't worsened  Denies sick contacts  The following portions of the patient's history were reviewed and updated as appropriate: allergies, current medications, past family history, past medical history, past social history, past surgical history and problem list     Review of Systems   Constitutional: Negative for fever and unexpected weight change  HENT: Negative for ear pain, sore throat and trouble swallowing  Eyes: Negative for pain and visual disturbance  Respiratory: Negative for cough, chest tightness, shortness of breath and wheezing  Cardiovascular: Negative for chest pain  Gastrointestinal: Negative for abdominal distention, abdominal pain, blood in stool, constipation, diarrhea, nausea and vomiting  Endocrine: Negative for polydipsia and polyuria  Genitourinary: Negative for dysuria and hematuria  Musculoskeletal: Positive for neck pain  Negative for back pain and myalgias  Skin: Negative for rash  Neurological: Negative for syncope and headaches  Psychiatric/Behavioral: Negative for suicidal ideas           PHQ-2/9 Depression Screening             Objective:      /89 (BP Location: Left arm, Patient Position: Sitting, Cuff Size: Adult)   Pulse 90   Temp 99 4 °F (37 4 °C) (Tympanic) Resp 18   Ht 5' (1 524 m)   Wt 85 kg (187 lb 6 4 oz)   SpO2 98%   BMI 36 60 kg/m²          Physical Exam  Constitutional:       Appearance: She is well-developed  HENT:      Head: Normocephalic and atraumatic  Right Ear: External ear normal       Left Ear: External ear normal       Mouth/Throat:      Pharynx: No oropharyngeal exudate  Comments: Erythema present  Eyes:      General: No scleral icterus  Conjunctiva/sclera: Conjunctivae normal       Pupils: Pupils are equal, round, and reactive to light  Cardiovascular:      Rate and Rhythm: Normal rate and regular rhythm  Heart sounds: No murmur heard  No friction rub  No gallop  Pulmonary:      Effort: Pulmonary effort is normal  No respiratory distress  Breath sounds: Normal breath sounds  No wheezing or rales  Abdominal:      General: Bowel sounds are normal  There is no distension  Palpations: Abdomen is soft  There is no mass  Tenderness: There is no abdominal tenderness  There is no rebound  Musculoskeletal:         General: Normal range of motion  Cervical back: Normal range of motion and neck supple  Lymphadenopathy:      Cervical: Cervical adenopathy (Tenderness on the left) present  Skin:     General: Skin is warm and dry  Neurological:      Mental Status: She is alert and oriented to person, place, and time

## 2022-05-19 ENCOUNTER — TELEPHONE (OUTPATIENT)
Dept: FAMILY MEDICINE CLINIC | Facility: CLINIC | Age: 62
End: 2022-05-19

## 2022-05-19 NOTE — TELEPHONE ENCOUNTER
Patient called she having trouble with her eye sight she believes she needs surgery  Patient says she is currently seeing Dr Michelle Raphael  Advised patient to call and schedule an appointment with her eye doctor patient agreed

## 2022-06-09 ENCOUNTER — TELEPHONE (OUTPATIENT)
Dept: NEUROLOGY | Facility: CLINIC | Age: 62
End: 2022-06-09

## 2022-06-10 ENCOUNTER — TELEPHONE (OUTPATIENT)
Dept: NEUROLOGY | Facility: CLINIC | Age: 62
End: 2022-06-10

## 2022-06-10 NOTE — TELEPHONE ENCOUNTER
Attempted call to pt and son x 2 regarding rescheduling of todays appt  phone numbers out of service

## 2022-06-22 ENCOUNTER — TELEPHONE (OUTPATIENT)
Dept: FAMILY MEDICINE CLINIC | Facility: CLINIC | Age: 62
End: 2022-06-22

## 2022-06-22 ENCOUNTER — HOSPITAL ENCOUNTER (EMERGENCY)
Facility: HOSPITAL | Age: 62
Discharge: HOME | End: 2022-06-22
Attending: EMERGENCY MEDICINE | Admitting: EMERGENCY MEDICINE
Payer: COMMERCIAL

## 2022-06-22 ENCOUNTER — APPOINTMENT (EMERGENCY)
Dept: CT IMAGING | Facility: HOSPITAL | Age: 62
End: 2022-06-22
Payer: COMMERCIAL

## 2022-06-22 VITALS
RESPIRATION RATE: 16 BRPM | OXYGEN SATURATION: 97 % | DIASTOLIC BLOOD PRESSURE: 68 MMHG | HEART RATE: 84 BPM | TEMPERATURE: 98.5 F | SYSTOLIC BLOOD PRESSURE: 114 MMHG

## 2022-06-22 DIAGNOSIS — S09.90XA MINOR HEAD INJURY, INITIAL ENCOUNTER: Primary | ICD-10-CM

## 2022-06-22 DIAGNOSIS — J44.9 ASTHMA WITH COPD (HCC): ICD-10-CM

## 2022-06-22 LAB
ALBUMIN SERPL BCP-MCNC: 3.9 G/DL (ref 3.5–5)
ALP SERPL-CCNC: 58 U/L (ref 34–104)
ALT SERPL W P-5'-P-CCNC: 46 U/L (ref 7–52)
ANION GAP SERPL CALCULATED.3IONS-SCNC: 8 MMOL/L (ref 4–13)
AST SERPL W P-5'-P-CCNC: 25 U/L (ref 13–39)
BASOPHILS # BLD AUTO: 0.04 THOUSANDS/ΜL (ref 0–0.1)
BASOPHILS NFR BLD AUTO: 1 % (ref 0–1)
BILIRUB SERPL-MCNC: 0.82 MG/DL (ref 0.2–1)
BUN SERPL-MCNC: 15 MG/DL (ref 5–25)
CALCIUM SERPL-MCNC: 9.6 MG/DL (ref 8.4–10.2)
CHLORIDE SERPL-SCNC: 104 MMOL/L (ref 96–108)
CO2 SERPL-SCNC: 28 MMOL/L (ref 21–32)
CREAT SERPL-MCNC: 0.61 MG/DL (ref 0.6–1.3)
EOSINOPHIL # BLD AUTO: 0.29 THOUSAND/ΜL (ref 0–0.61)
EOSINOPHIL NFR BLD AUTO: 4 % (ref 0–6)
ERYTHROCYTE [DISTWIDTH] IN BLOOD BY AUTOMATED COUNT: 13.2 % (ref 11.6–15.1)
GFR SERPL CREATININE-BSD FRML MDRD: 99 ML/MIN/1.73SQ M
GLUCOSE SERPL-MCNC: 90 MG/DL (ref 65–140)
HCT VFR BLD AUTO: 45.7 % (ref 34.8–46.1)
HGB BLD-MCNC: 14.8 G/DL (ref 11.5–15.4)
IMM GRANULOCYTES # BLD AUTO: 0.03 THOUSAND/UL (ref 0–0.2)
IMM GRANULOCYTES NFR BLD AUTO: 0 % (ref 0–2)
LYMPHOCYTES # BLD AUTO: 1.32 THOUSANDS/ΜL (ref 0.6–4.47)
LYMPHOCYTES NFR BLD AUTO: 18 % (ref 14–44)
MAGNESIUM SERPL-MCNC: 1.9 MG/DL (ref 1.9–2.7)
MCH RBC QN AUTO: 28.6 PG (ref 26.8–34.3)
MCHC RBC AUTO-ENTMCNC: 32.4 G/DL (ref 31.4–37.4)
MCV RBC AUTO: 88 FL (ref 82–98)
MONOCYTES # BLD AUTO: 0.82 THOUSAND/ΜL (ref 0.17–1.22)
MONOCYTES NFR BLD AUTO: 11 % (ref 4–12)
NEUTROPHILS # BLD AUTO: 4.77 THOUSANDS/ΜL (ref 1.85–7.62)
NEUTS SEG NFR BLD AUTO: 66 % (ref 43–75)
NRBC BLD AUTO-RTO: 0 /100 WBCS
PHOSPHATE SERPL-MCNC: 3.7 MG/DL (ref 2.7–4.5)
PLATELET # BLD AUTO: 311 THOUSANDS/UL (ref 149–390)
PMV BLD AUTO: 9.9 FL (ref 8.9–12.7)
POTASSIUM SERPL-SCNC: 4 MMOL/L (ref 3.5–5.3)
PROT SERPL-MCNC: 6.9 G/DL (ref 6.4–8.4)
RBC # BLD AUTO: 5.17 MILLION/UL (ref 3.81–5.12)
SODIUM SERPL-SCNC: 140 MMOL/L (ref 135–147)
WBC # BLD AUTO: 7.27 THOUSAND/UL (ref 4.31–10.16)

## 2022-06-22 PROCEDURE — 80053 COMPREHEN METABOLIC PANEL: CPT | Performed by: EMERGENCY MEDICINE

## 2022-06-22 PROCEDURE — G1004 CDSM NDSC: HCPCS

## 2022-06-22 PROCEDURE — 70450 CT HEAD/BRAIN W/O DYE: CPT

## 2022-06-22 PROCEDURE — 96374 THER/PROPH/DIAG INJ IV PUSH: CPT

## 2022-06-22 PROCEDURE — 84100 ASSAY OF PHOSPHORUS: CPT | Performed by: EMERGENCY MEDICINE

## 2022-06-22 PROCEDURE — 83735 ASSAY OF MAGNESIUM: CPT | Performed by: EMERGENCY MEDICINE

## 2022-06-22 PROCEDURE — 99284 EMERGENCY DEPT VISIT MOD MDM: CPT

## 2022-06-22 PROCEDURE — 99284 EMERGENCY DEPT VISIT MOD MDM: CPT | Performed by: EMERGENCY MEDICINE

## 2022-06-22 PROCEDURE — 85025 COMPLETE CBC W/AUTO DIFF WBC: CPT | Performed by: EMERGENCY MEDICINE

## 2022-06-22 PROCEDURE — 36415 COLL VENOUS BLD VENIPUNCTURE: CPT | Performed by: EMERGENCY MEDICINE

## 2022-06-22 RX ORDER — ONDANSETRON 2 MG/ML
4 INJECTION INTRAMUSCULAR; INTRAVENOUS ONCE
Status: COMPLETED | OUTPATIENT
Start: 2022-06-22 | End: 2022-06-22

## 2022-06-22 RX ORDER — CETIRIZINE HYDROCHLORIDE 10 MG/1
TABLET ORAL
Qty: 30 TABLET | Refills: 2 | Status: SHIPPED | OUTPATIENT
Start: 2022-06-22

## 2022-06-22 RX ADMIN — ONDANSETRON 4 MG: 2 INJECTION INTRAMUSCULAR; INTRAVENOUS at 12:39

## 2022-06-22 NOTE — ED PROVIDER NOTES
History  Chief Complaint   Patient presents with    Head Injury     Pt was cleaning yesterday and hit her head against a spiral staircase, - thinners, - LOC, pt has had a headache, dizziness, and nausea since     65-year-old female with past medical history of asthma/COPD, osteoarthritis in multiple sclerosis who presents with nausea and headache  Patient hit her head on a spiral staircase yesterday and felt fine immediately afterwards  She did not sustain any loss of consciousness  Hours after was and throughout the night patient had worsening nausea and dizziness which she describes as a lightheaded sensation  She describes her headache as a frontal aching that does not radiate  She noticed a lump on the front of her head that is tender to palpation  Since the onset of her symptoms she has not had any vision changes, numbness, tingling, focal deficit, chest pain, abdominal pain, difficulty ambulating, fevers, arm/leg pain or swelling  History provided by:  Patient  Head Injury w/unknown LOC  Location:  Frontal  Mechanism of injury: direct blow    Pain details:     Quality:  Throbbing and aching    Severity:  Moderate    Timing:  Constant    Progression:  Worsening  Chronicity:  New  Relieved by:  Nothing  Worsened by: Movement  Ineffective treatments:  None tried  Associated symptoms: headache and nausea    Associated symptoms: no blurred vision, no double vision, no focal weakness, no seizures and no vomiting        Prior to Admission Medications   Prescriptions Last Dose Informant Patient Reported? Taking? Dimethyl Fumarate 120 & 240 MG MISC   No No   Sig: Take 120 mg by mouth 2 (two) times a day for 7 days, THEN 240 mg 2 (two) times a day for 23 days     Dimethyl Fumarate 240 MG CPDR  Self No No   Sig: Take 1 capsule (240 mg total) by mouth 2 (two) times a day   FLUoxetine (PROzac) 10 mg capsule  Self No No   Sig: TAKE 1 CAPSULE BY MOUTH EVERY DAY   SUMAtriptan (IMITREX) 100 mg tablet Self No No   Sig: TAKE 1 TABLET (100 MG TOTAL) BY MOUTH ONCE AS NEEDED FOR MIGRAINE FOR UP TO 1 DOSE   Ventolin  (90 Base) MCG/ACT inhaler  Self No No   Sig: Inhale 2 puffs every 4 (four) hours as needed for wheezing   albuterol (2 5 mg/3 mL) 0 083 % nebulizer solution  Self No No   Sig: Take 1 vial (2 5 mg total) by nebulization every 4 (four) hours as needed for wheezing or shortness of breath   baclofen 10 mg tablet  Self No No   Sig: Take 1 tablet (10 mg total) by mouth daily at bedtime as needed for muscle spasms   brompheniramine-pseudoephedrine-DM 30-2-10 MG/5ML syrup   No No   Sig: Take 10 mL by mouth 4 (four) times a day as needed for allergies   cetirizine (ZyrTEC) 10 mg tablet   No No   Sig: TAKE 1 TABLET BY MOUTH EVERY DAY   gabapentin (NEURONTIN) 300 mg capsule   No No   Sig: TAKE 1 CAPS IN THE MORNING AND NOON AND 2 CAPS BEFORE BEDTIME   mometasone-formoterol (DULERA) 100-5 MCG/ACT inhaler  Self No No   Sig: Inhale 2 puffs 2 (two) times a day Rinse mouth after use     oxyCODONE-acetaminophen (Percocet) 5-325 mg per tablet  Self No No   Sig: Take 1 tablet by mouth every 4 (four) hours as needed for moderate pain for up to 6 doses Max Daily Amount: 6 tablets   Patient not taking: No sig reported   zolpidem (AMBIEN) 5 mg tablet  Self No No   Sig: Take 1 tablet (5 mg total) by mouth daily at bedtime as needed for sleep      Facility-Administered Medications: None       Past Medical History:   Diagnosis Date    Asthma     Asthma with COPD ([facility] [address] )     last assessed-9/1/2017    Closed fracture of fourth lumbar vertebra ([facility] [address] )     unspecified fracture morphology, initial [id number]-rittikjk-9/31/2017    COPD (chronic obstructive pulmonary disease) (HCC)     Heart murmur     last assessed-5/4/2012    Laceration of finger     last assessed-2/26/2014    Lipoma     last assessed-5/3/2013    Migraine     last assessed-1/16/2013    Multiple sclerosis ([facility] [address] )     last assessed-11/14/[id number]  Obstructive sleep apnea     last assessed-9/23/2013    Osteoarthritis     last assessed-5/4/2012    Post traumatic stress disorder     last assessed-12/13/2013    Tenosynovitis of thumb     left thumb-septic-last assessed-3/26/2014    Weight gain     last assessed-10/17/2013-50lb in about a year with frequent steroids       Past Surgical History:   Procedure Laterality Date    FINGER SURGERY      KNEE ARTHROSCOPY W/ MENISCAL REPAIR Right     description: 4/2012-KNEE ARTHROSCOPY W/ MEDIAL MENISCAL REPAIR        Family History   Problem Relation Age of Onset    Hepatitis Mother         C    Asthma Father     COPD Father     Diabetes Father     Hypertension Father     Asthma Brother      I have reviewed and agree with the history as documented  E-Cigarette/Vaping    E-Cigarette Use Never User      E-Cigarette/Vaping Substances     Social History     Tobacco Use    Smoking status: Current Every Day Smoker     Packs/day: 0 25     Years: 25 00     Pack years: 6 25     Types: Cigarettes    Smokeless tobacco: Never Used   Vaping Use    Vaping Use: Never used   Substance Use Topics    Alcohol use: Not Currently    Drug use: No       Review of Systems   Constitutional: Negative for chills and fever  HENT: Negative for ear pain and sore throat  Eyes: Negative for blurred vision, double vision, pain and visual disturbance  Respiratory: Negative for cough and shortness of breath  Cardiovascular: Negative for chest pain and palpitations  Gastrointestinal: Positive for nausea  Negative for abdominal pain and vomiting  Genitourinary: Negative for dysuria and hematuria  Musculoskeletal: Negative for arthralgias and back pain  Skin: Negative for color change and rash  Neurological: Positive for headaches  Negative for focal weakness, seizures and syncope  All other systems reviewed and are negative  Physical Exam  Physical Exam  Vitals and nursing note reviewed     Constitutional: General: She is in acute distress  Appearance: She is well-developed  HENT:      Head: Normocephalic and atraumatic  Mouth/Throat:      Mouth: Mucous membranes are moist    Eyes:      Extraocular Movements: Extraocular movements intact  Conjunctiva/sclera: Conjunctivae normal       Pupils: Pupils are equal, round, and reactive to light  Cardiovascular:      Rate and Rhythm: Normal rate and regular rhythm  Heart sounds: No murmur heard  Pulmonary:      Effort: Pulmonary effort is normal  No respiratory distress  Breath sounds: Normal breath sounds  Abdominal:      Palpations: Abdomen is soft  Tenderness: There is no abdominal tenderness  Musculoskeletal:         General: No swelling, tenderness or deformity  Cervical back: Neck supple  Skin:     General: Skin is warm and dry  Neurological:      General: No focal deficit present  Mental Status: She is alert and oriented to person, place, and time  Mental status is at baseline  GCS: GCS eye subscore is 4  GCS verbal subscore is 5  GCS motor subscore is 6  Cranial Nerves: Cranial nerves are intact  No cranial nerve deficit or facial asymmetry  Sensory: Sensation is intact  Motor: No weakness        Coordination: Coordination normal  Finger-Nose-Finger Test and Heel to [facility] Test normal          Vital Signs  ED Triage Vitals [06/22/22 1131]   Temperature Pulse Respirations Blood Pressure SpO2   98 5 °F (36 9 °C) 84 16 114/68 97 %      Temp Source Heart Rate Source Patient Position - Orthostatic VS BP Location FiO2 (%)   Oral Monitor Sitting Left arm --      Pain Score       --           Vitals:    06/22/22 1131   BP: 114/68   Pulse: 84   Patient Position - Orthostatic VS: Sitting         Visual Acuity      ED Medications  Medications   ondansetron (ZOFRAN) injection 4 mg (has no administration in time range)       Diagnostic Studies  Results Reviewed     Procedure Component Value Units Date/Time    CBC and differential [[id number]]     Lab Status: No result Specimen: Blood     Comprehensive metabolic panel [[id number]]     Lab Status: No result Specimen: Blood     Magnesium [[id number]]     Lab Status: No result Specimen: Blood     Phosphorus [[id number]]     Lab Status: No result Specimen: Blood                  CT head without contrast    (Results Pending)              Procedures  Procedures         ED Course  ED Course as of 06/22/22 1853   Wed Jun 22, 2022   1401 Patient informed of her lab and imaging results  She is specifically requesting discharge  Had a long and thorough discussion with the patient regarding the specific signs and symptoms that warrant return to the emergency department  Reiterated the importance of outpatient follow-up with strict return precautions  Patient verbalized understanding of these discharge instructions as demonstrated by the teach back method  They are in agreement with this current treatment plan of discharge, with all questions answered  MDM  Number of Diagnoses or Management Options  Diagnosis management comments: Presentation, symptoms and overall clinical picture raises concern for increased ICP secondary to [address] from a blunt head injury  Will obtain labs to further evaluate and a CT head without contrast   Symptoms may also be secondary to musculoskeletal pain/strain/sprain vs BPPV  Will administer Zofran and re-evaluate  Disposition dependent on workup however likely plan to admit for further evaluation and monitoring  Patient is aware of and in agreement of this current treatment plan with all questions answered         Amount and/or Complexity of Data Reviewed  Clinical lab tests: ordered and reviewed  Tests in the radiology section of CPT®: ordered and reviewed  Tests in the medicine section of CPT®: ordered and reviewed  Review and summarize past medical records: yes    Risk of Complications, Morbidity, and/or Mortality  Presenting problems: high  Diagnostic procedures: high        Disposition  Final diagnoses:   None     ED Disposition     None      Follow-up Information    None         Patient's Medications   Discharge Prescriptions    No medications on file       No discharge procedures on file      PDMP Review     None          ED Provider  Electronically Signed by           Bonifacio Nguyễn DO  06/22/22 2682

## 2022-06-22 NOTE — TELEPHONE ENCOUNTER
Patient called to report that she hit her head yesterday on a spiral staircase, today she is experiencing a headache, nausea, dizziness and the lump on her head has increased in size  Patient advised to report to the ER for evaluation of her symptoms  Patient agreed

## 2022-06-27 ENCOUNTER — HOSPITAL ENCOUNTER (EMERGENCY)
Facility: HOSPITAL | Age: 62
Discharge: HOME | End: 2022-06-27
Attending: EMERGENCY MEDICINE
Payer: COMMERCIAL

## 2022-06-27 VITALS
HEART RATE: 81 BPM | HEIGHT: 60 IN | DIASTOLIC BLOOD PRESSURE: 74 MMHG | SYSTOLIC BLOOD PRESSURE: 116 MMHG | OXYGEN SATURATION: 95 % | RESPIRATION RATE: 17 BRPM | BODY MASS INDEX: 36.6 KG/M2 | TEMPERATURE: 97.9 F

## 2022-06-27 DIAGNOSIS — N76.4 VULVAR ABSCESS: Primary | ICD-10-CM

## 2022-06-27 PROCEDURE — 96372 THER/PROPH/DIAG INJ SC/IM: CPT

## 2022-06-27 PROCEDURE — 99283 EMERGENCY DEPT VISIT LOW MDM: CPT

## 2022-06-27 PROCEDURE — 56405 I&D VULVA/PERINEAL ABSCESS: CPT | Performed by: EMERGENCY MEDICINE

## 2022-06-27 PROCEDURE — 99284 EMERGENCY DEPT VISIT MOD MDM: CPT | Performed by: EMERGENCY MEDICINE

## 2022-06-27 RX ORDER — IBUPROFEN 400 MG/1
800 TABLET ORAL ONCE
Status: DISCONTINUED | OUTPATIENT
Start: 2022-06-27 | End: 2022-06-27

## 2022-06-27 RX ORDER — LIDOCAINE HYDROCHLORIDE AND EPINEPHRINE 10; 10 MG/ML; UG/ML
20 INJECTION, SOLUTION INFILTRATION; PERINEURAL ONCE
Status: COMPLETED | OUTPATIENT
Start: 2022-06-27 | End: 2022-06-27

## 2022-06-27 RX ORDER — SULFAMETHOXAZOLE AND TRIMETHOPRIM 800; 160 MG/1; MG/1
1 TABLET ORAL 2 TIMES DAILY
Qty: 14 TABLET | Refills: 0 | Status: SHIPPED | OUTPATIENT
Start: 2022-06-27 | End: 2022-07-04

## 2022-06-27 RX ORDER — KETOROLAC TROMETHAMINE 30 MG/ML
60 INJECTION, SOLUTION INTRAMUSCULAR; INTRAVENOUS ONCE
Status: COMPLETED | OUTPATIENT
Start: 2022-06-27 | End: 2022-06-27

## 2022-06-27 RX ORDER — OXYCODONE HYDROCHLORIDE AND ACETAMINOPHEN 5; 325 MG/1; MG/1
1 TABLET ORAL EVERY 4 HOURS PRN
Qty: 12 TABLET | Refills: 0 | Status: SHIPPED | OUTPATIENT
Start: 2022-06-27

## 2022-06-27 RX ADMIN — KETOROLAC TROMETHAMINE 60 MG: 30 INJECTION, SOLUTION INTRAMUSCULAR at 14:08

## 2022-06-27 RX ADMIN — LIDOCAINE HYDROCHLORIDE AND EPINEPHRINE 20 ML: 10; 10 INJECTION, SOLUTION INFILTRATION; PERINEURAL at 13:59

## 2022-06-27 NOTE — ED PROVIDER NOTES
History  Chief Complaint   Patient presents with    Groin Pain     Pt reports having abscess on R groin +swelling and pain denies fevers states looks like it wants to pop but it wont      Patient is a 51-year-old female  Not diabetic  Tetanus vaccination is up-to-date  She presents with a five-day history of a boil to the right labia majora  No fever or chills  Moderate in severity  No relieving factors  Prior to Admission Medications   Prescriptions Last Dose Informant Patient Reported? Taking? Dimethyl Fumarate 120 & 240 MG MISC   No No   Sig: Take 120 mg by mouth 2 (two) times a day for 7 days, THEN 240 mg 2 (two) times a day for 23 days  Dimethyl Fumarate 240 MG CPDR  Self No No   Sig: Take 1 capsule (240 mg total) by mouth 2 (two) times a day   FLUoxetine (PROzac) 10 mg capsule  Self No No   Sig: TAKE 1 CAPSULE BY MOUTH EVERY DAY   SUMAtriptan (IMITREX) 100 mg tablet  Self No No   Sig: TAKE 1 TABLET (100 MG TOTAL) BY MOUTH ONCE AS NEEDED FOR MIGRAINE FOR UP TO 1 DOSE   Ventolin  (90 Base) MCG/ACT inhaler  Self No No   Sig: Inhale 2 puffs every 4 (four) hours as needed for wheezing   albuterol (2 5 mg/3 mL) 0 083 % nebulizer solution  Self No No   Sig: Take 1 vial (2 5 mg total) by nebulization every 4 (four) hours as needed for wheezing or shortness of breath   baclofen 10 mg tablet  Self No No   Sig: Take 1 tablet (10 mg total) by mouth daily at bedtime as needed for muscle spasms   brompheniramine-pseudoephedrine-DM 30-2-10 MG/5ML syrup   No No   Sig: Take 10 mL by mouth 4 (four) times a day as needed for allergies   cetirizine (ZyrTEC) 10 mg tablet   No No   Sig: TAKE 1 TABLET BY MOUTH EVERY DAY   gabapentin (NEURONTIN) 300 mg capsule   No No   Sig: TAKE 1 CAPS IN THE MORNING AND NOON AND 2 CAPS BEFORE BEDTIME   mometasone-formoterol (DULERA) 100-5 MCG/ACT inhaler  Self No No   Sig: Inhale 2 puffs 2 (two) times a day Rinse mouth after use     oxyCODONE-acetaminophen (Percocet) 5-325 mg per tablet  Self No No   Sig: Take 1 tablet by mouth every 4 (four) hours as needed for moderate pain for up to 6 doses Max Daily Amount: 6 tablets   Patient not taking: No sig reported   zolpidem (AMBIEN) 5 mg tablet  Self No No   Sig: Take 1 tablet (5 mg total) by mouth daily at bedtime as needed for sleep      Facility-Administered Medications: None       Past Medical History:   Diagnosis Date    Asthma     Asthma with COPD ([facility] )     last assessed-9/1/2017    Closed fracture of fourth lumbar vertebra ([facility] )     unspecified fracture morphology, initial xewhxiaey-uyfoghkf-7/31/2017    [facility]chronic obstructive pulmonary disease) ([facility])     Heart murmur     last assessed-5/4/2012    Laceration of finger     last assessed-2/26/2014    Lipoma     last assessed-5/3/2013    Migraine     last assessed-1/16/2013    Multiple sclerosis ([facility] )     last assessed-11/14/20177916-[id number]-wtdmozmgy    Obstructive sleep apnea     last assessed-9/23/2013    Osteoarthritis     last assessed-5/4/2012    Post traumatic stress disorder     last assessed-12/13/2013    Tenosynovitis of thumb     left thumb-septic-last assessed-3/26/2014    Weight gain     last assessed-10/17/2013-50lb in about a year with frequent steroids       Past Surgical History:   Procedure Laterality Date    FINGER SURGERY      KNEE ARTHROSCOPY W/ MENISCAL REPAIR Right     description: 4/2012-KNEE ARTHROSCOPY W/ MEDIAL MENISCAL REPAIR        Family History   Problem Relation Age of Onset    Hepatitis Mother         C    Asthma Father     COPD Father     Diabetes Father     Hypertension Father     Asthma Brother      I have reviewed and agree with the history as documented      E-Cigarette/Vaping    E-Cigarette Use Never User      E-Cigarette/Vaping Substances     Social History     Tobacco Use    Smoking status: Current Every Day Smoker     Packs/day: 0 25     Years: 25 00     Pack years: 6 25     Types: Cigarettes    Smokeless tobacco: Never Used   Vaping Use    Vaping Use: Never used   Substance Use Topics    Alcohol use: Not Currently    Drug use: No       Review of Systems   Constitutional: Negative for chills and fever  Genitourinary: Negative for vaginal bleeding and vaginal discharge  All other systems reviewed and are negative  Physical Exam  Physical Exam  Vitals reviewed  Constitutional:       General: She is not in acute distress  HENT:      Head: Normocephalic and atraumatic  Nose: Nose normal       Mouth/Throat:      Mouth: Mucous membranes are moist    Eyes:      General:         Right eye: No discharge  Left eye: No discharge  Conjunctiva/sclera: Conjunctivae normal    Pulmonary:      Effort: Pulmonary effort is normal  No respiratory distress  Genitourinary:     Comments: There is a 3-4 cm tender mass to the right labia majora  Musculoskeletal:         General: No deformity or signs of injury  Cervical back: Normal range of motion and neck supple  Skin:     General: Skin is warm and dry  Findings: No rash  Neurological:      General: No focal deficit present  Mental Status: She is alert and oriented to person, place, and time     Psychiatric:         Mood and Affect: Mood normal          Behavior: Behavior normal          Vital Signs  ED Triage Vitals [06/27/22 1220]   Temperature Pulse Respirations Blood Pressure SpO2   97 9 °F (36 6 °C) 81 17 116/74 95 %      Temp Source Heart Rate Source Patient Position - Orthostatic VS BP Location FiO2 (%)   Oral Monitor Sitting Left arm --      Pain Score       8           Vitals:    06/27/22 1220   BP: 116/74   Pulse: 81   Patient Position - Orthostatic VS: Sitting         Visual Acuity      ED Medications  Medications   lidocaine-epinephrine (XYLOCAINE/EPINEPHRINE) 1 %-1:100,000 injection 20 mL (20 mL Infiltration Given 6/27/22 1359)   ketorolac (TORADOL) injection 60 mg (60 mg Intramuscular Given 6/27/22 1408)       Diagnostic Studies  Results Reviewed     None                 No orders to display              Procedures  Incision and drain    Date/Time: 6/27/2022 2:35 PM  Performed by: Tiki Post MD  Authorized by: Tiki Post MD   Universal Protocol:  Consent: Verbal consent obtained  Patient identity confirmed: verbally with patient      Patient location:  ED  Location:     Type:  Abscess    Location:  Anogenital    Anogenital location:  [place]  Pre-procedure details:     Skin preparation:  Betadine  Anesthesia (see MAR for exact dosages): Anesthesia method:  Local infiltration    Local anesthetic:  Lidocaine 1% WITH epi  Procedure details:     Complexity:  Intermediate    Incision types:  Single straight    Scalpel blade:  11    Approach:  Open    Wound management:  Probed and deloculated and irrigated with saline    Drainage:  Purulent    Drainage amount:  Scant    Wound treatment:  Wound left open    Packing materials:  None  Post-procedure details:     Patient tolerance of procedure: Tolerated well, no immediate complications             ED Course                                             MDM  Number of Diagnoses or Management Options  Diagnosis management comments: I&D performed  Appropriate for discharge and outpatient management  Disposition  Final diagnoses:   Vulvar abscess     Time reflects when diagnosis was documented in both MDM as applicable and the Disposition within this note     Time User Action Codes Description Comment    6/27/2022  2:36 PM Zo Ulloa Add [N76 4] Vulvar abscess       ED Disposition     ED Disposition   Discharge    Condition   Stable    Date/Time   Mon Jun 27, 2022  2:36 PM    Comment   Naya Wheeler discharge to home/self care                 Follow-up Information     Follow up With Specialties Details Why Contact Info Additional Information    [facility] In 1 week  [place] [address] [address]  [address], [address], [place], [place], [address] [phone]          Patient's Medications   Discharge Prescriptions    OXYCODONE-ACETAMINOPHEN (PERCOCET) 5-325 MG PER TABLET    Take 1 tablet by mouth every 4 (four) hours as needed for severe pain Max Daily Amount: 6 tablets       Start Date: 6/27/2022 End Date: --       Order Dose: 1 tablet       Quantity: 12 tablet    Refills: 0    SULFAMETHOXAZOLE-TRIMETHOPRIM (BACTRIM DS) 800-160 MG PER TABLET    Take 1 tablet by mouth 2 (two) times a day for 7 days smx-tmp DS (BACTRIM) 800-160 mg tabs (1tab q12 D10)       Start Date: 6/27/2022 End Date: 7/4/2022       Order Dose: 1 tablet       Quantity: 14 tablet    Refills: 0       No discharge procedures on file      PDMP Review     None          ED Provider  Electronically Signed by           Sudarshan Pagan MD  06/27/22 0899

## 2022-06-28 ENCOUNTER — OFFICE VISIT (OUTPATIENT)
Dept: OBGYN CLINIC | Facility: CLINIC | Age: 62
End: 2022-06-28
Payer: COMMERCIAL

## 2022-06-28 VITALS
WEIGHT: 189.4 LBS | SYSTOLIC BLOOD PRESSURE: 120 MMHG | DIASTOLIC BLOOD PRESSURE: 84 MMHG | BODY MASS INDEX: 37.18 KG/M2 | HEIGHT: 60 IN

## 2022-06-28 DIAGNOSIS — L03.315 CELLULITIS, PERINEUM: Primary | ICD-10-CM

## 2022-06-28 DIAGNOSIS — N90.89 VULVAR LESION: ICD-10-CM

## 2022-06-28 PROCEDURE — 87255 GENET VIRUS ISOLATE HSV: CPT | Performed by: OBSTETRICS & GYNECOLOGY

## 2022-06-28 PROCEDURE — 99203 OFFICE O/P NEW LOW 30 MIN: CPT | Performed by: OBSTETRICS & GYNECOLOGY

## 2022-07-01 LAB — HSV SPEC CULT: NORMAL

## 2022-07-08 DIAGNOSIS — F32.A ANXIETY AND DEPRESSION: ICD-10-CM

## 2022-07-08 DIAGNOSIS — G35 MULTIPLE SCLEROSIS, RELAPSING-REMITTING (HCC): ICD-10-CM

## 2022-07-08 DIAGNOSIS — F41.9 ANXIETY AND DEPRESSION: ICD-10-CM

## 2022-07-08 NOTE — TELEPHONE ENCOUNTER
Pt calling to request refill of Ambien  Pt state she is completely out  LOV 12/28/21 with Dr Gail Murray  Scheduled pt for 8/31/22 with same provider  Dr Gail Murray - Rx entered from pharmacy  Please review and sign if in agreement

## 2022-07-09 RX ORDER — ZOLPIDEM TARTRATE 5 MG/1
TABLET ORAL
Qty: 90 TABLET | Refills: 3 | Status: SHIPPED | OUTPATIENT
Start: 2022-07-09 | End: 2022-10-05 | Stop reason: SDUPTHER

## 2022-08-05 ENCOUNTER — VBI (OUTPATIENT)
Dept: ADMINISTRATIVE | Facility: OTHER | Age: 62
End: 2022-08-05

## 2022-08-12 DIAGNOSIS — G43.909 MIGRAINE WITHOUT STATUS MIGRAINOSUS, NOT INTRACTABLE, UNSPECIFIED MIGRAINE TYPE: ICD-10-CM

## 2022-08-12 DIAGNOSIS — J44.9 ASTHMA WITH COPD (HCC): ICD-10-CM

## 2022-08-12 RX ORDER — SUMATRIPTAN 100 MG/1
100 TABLET, FILM COATED ORAL ONCE AS NEEDED
Qty: 9 TABLET | Refills: 0 | Status: SHIPPED | OUTPATIENT
Start: 2022-08-12

## 2022-08-16 DIAGNOSIS — F31.9 BIPOLAR 1 DISORDER (HCC): ICD-10-CM

## 2022-08-16 RX ORDER — FLUOXETINE 10 MG/1
CAPSULE ORAL
Qty: 90 CAPSULE | Refills: 0 | Status: SHIPPED | OUTPATIENT
Start: 2022-08-16 | End: 2022-10-04

## 2022-08-23 ENCOUNTER — APPOINTMENT (OUTPATIENT)
Dept: LAB | Facility: CLINIC | Age: 62
End: 2022-08-23
Payer: COMMERCIAL

## 2022-09-15 ENCOUNTER — TELEPHONE (OUTPATIENT)
Dept: NEUROLOGY | Facility: CLINIC | Age: 62
End: 2022-09-15

## 2022-09-15 NOTE — TELEPHONE ENCOUNTER
Correct - if patient have medications provided by our office and require refrigerator use, we provide a letter    Also, if this would be beginning of summer and electricity needed for [facility] then we offer a letter,at this point we would not be able to accommodate request

## 2022-09-15 NOTE — TELEPHONE ENCOUNTER
Pt left voicemail asking for call back ASAP  Called pt, she reports she needs documentation faxed to [employer] stating she needs her electricity on due to her medical condition  Does not have form or instructions on where to send this  I did question pt further regarding need for electricity in relation to [address]  She states electricity is needed to avoid stress  Pt does also have refrigerated medication (insulin) that is not ordered by our office  Please advise if this is something we will assist pt with or if pt should try contacting the provider who prescribes insulin

## 2022-10-04 ENCOUNTER — TELEPHONE (OUTPATIENT)
Dept: NEUROLOGY | Facility: CLINIC | Age: 62
End: 2022-10-04

## 2022-10-04 ENCOUNTER — TELEPHONE (OUTPATIENT)
Dept: INTERNAL MEDICINE CLINIC | Facility: CLINIC | Age: 62
End: 2022-10-04

## 2022-10-04 DIAGNOSIS — G35 MULTIPLE SCLEROSIS (HCC): Primary | ICD-10-CM

## 2022-10-04 DIAGNOSIS — G35 MULTIPLE SCLEROSIS, RELAPSING-REMITTING (HCC): ICD-10-CM

## 2022-10-04 DIAGNOSIS — R26.2 DIFFICULTY WALKING: ICD-10-CM

## 2022-10-04 DIAGNOSIS — F31.9 BIPOLAR 1 DISORDER (HCC): ICD-10-CM

## 2022-10-04 RX ORDER — FLUOXETINE 10 MG/1
CAPSULE ORAL
Qty: 90 CAPSULE | Refills: 0 | Status: SHIPPED | OUTPATIENT
Start: 2022-10-04

## 2022-10-04 NOTE — TELEPHONE ENCOUNTER
Called and spoke with pt  She reports pain in her spine that radiates up and down her back  Describes throbbing pain, rates this 5-8/10  Has tried tylenol, motrin, bengay, icepack, and heat  Nothing helps  Only relief is when she is asleep  Feels baclofen helps as it helps her get asleep  Cannot take this during the day as it makes her sleepy  Pt also reports she is out of baclofen  Advised refills were sent to pharmacy, she will contact them  Pt is taking gabapentin 300mg caps 3 caps 3-5x per day  States this does not help  I did review chart and noted that pt was last seen on 12/28/21 to return in 4 months  Pt cancelled 4/28/22 visit  No show to 5/6/22, 5/26/22, and 6/10/22 appts  Cancelled 8/31/22 appt  Advised pt she is overdue for f/u  She accepted appt for tomorrow with Dr Mccullough  Would like to discuss concerns further at that time  Pt confirmed PCP, phone number, and insurance information on file with no changes  Dr Mccullough, FYALY  No need to call back pt unless you have something to advise prior to appt tomorrow

## 2022-10-04 NOTE — TELEPHONE ENCOUNTER
Mehnaz Carpenter from [facility] neurology called, patient has an upcoming appointment and will need an ambulatory referral to neurology physician to physician with codes, Melissa Pineda, and R26 2   Patient's appointment is scheduled for tomorrow

## 2022-10-04 NOTE — TELEPHONE ENCOUNTER
Patient left voicemail requesting call back today  She is looking for something for pain other than gabapentin which she says she is eating like candy      CB: [phone]

## 2022-10-05 ENCOUNTER — OFFICE VISIT (OUTPATIENT)
Dept: NEUROLOGY | Facility: CLINIC | Age: 62
End: 2022-10-05
Payer: COMMERCIAL

## 2022-10-05 VITALS
DIASTOLIC BLOOD PRESSURE: 74 MMHG | WEIGHT: 194 LBS | HEIGHT: 61 IN | TEMPERATURE: 97.4 F | HEART RATE: 73 BPM | BODY MASS INDEX: 36.63 KG/M2 | SYSTOLIC BLOOD PRESSURE: 126 MMHG

## 2022-10-05 DIAGNOSIS — M54.50 CHRONIC MIDLINE LOW BACK PAIN WITHOUT SCIATICA: ICD-10-CM

## 2022-10-05 DIAGNOSIS — E55.9 VITAMIN D DEFICIENCY: ICD-10-CM

## 2022-10-05 DIAGNOSIS — M79.2 NEUROPATHIC PAIN: ICD-10-CM

## 2022-10-05 DIAGNOSIS — G89.29 CHRONIC MIDLINE LOW BACK PAIN WITHOUT SCIATICA: ICD-10-CM

## 2022-10-05 DIAGNOSIS — G35 MULTIPLE SCLEROSIS, RELAPSING-REMITTING (HCC): Primary | ICD-10-CM

## 2022-10-05 DIAGNOSIS — G43.909 MIGRAINE WITHOUT STATUS MIGRAINOSUS, NOT INTRACTABLE, UNSPECIFIED MIGRAINE TYPE: ICD-10-CM

## 2022-10-05 DIAGNOSIS — F41.9 ANXIETY AND DEPRESSION: ICD-10-CM

## 2022-10-05 DIAGNOSIS — R26.2 DIFFICULTY WALKING: ICD-10-CM

## 2022-10-05 DIAGNOSIS — F32.A ANXIETY AND DEPRESSION: ICD-10-CM

## 2022-10-05 PROCEDURE — 96372 THER/PROPH/DIAG INJ SC/IM: CPT | Performed by: PSYCHIATRY & NEUROLOGY

## 2022-10-05 PROCEDURE — 99215 OFFICE O/P EST HI 40 MIN: CPT | Performed by: PSYCHIATRY & NEUROLOGY

## 2022-10-05 RX ORDER — ZOLPIDEM TARTRATE 5 MG/1
5 TABLET ORAL
Qty: 90 TABLET | Refills: 3 | Status: SHIPPED | OUTPATIENT
Start: 2022-10-05

## 2022-10-05 RX ORDER — METHYLPREDNISOLONE 4 MG/1
TABLET ORAL
Qty: 1 EACH | Refills: 0 | Status: SHIPPED | OUTPATIENT
Start: 2022-10-05 | End: 2022-10-05 | Stop reason: SDUPTHER

## 2022-10-05 RX ORDER — METHYLPREDNISOLONE 4 MG/1
TABLET ORAL
Qty: 1 EACH | Refills: 0 | Status: SHIPPED | OUTPATIENT
Start: 2022-10-05

## 2022-10-05 RX ORDER — KETOROLAC TROMETHAMINE 30 MG/ML
30 INJECTION, SOLUTION INTRAMUSCULAR; INTRAVENOUS ONCE
Status: COMPLETED | OUTPATIENT
Start: 2022-10-05 | End: 2022-10-05

## 2022-10-05 RX ORDER — ZOLPIDEM TARTRATE 5 MG/1
5 TABLET ORAL
Qty: 90 TABLET | Refills: 3 | Status: SHIPPED | OUTPATIENT
Start: 2022-10-05 | End: 2022-10-05 | Stop reason: SDUPTHER

## 2022-10-05 RX ADMIN — KETOROLAC TROMETHAMINE 30 MG: 30 INJECTION, SOLUTION INTRAMUSCULAR; INTRAVENOUS at 15:36

## 2022-10-05 NOTE — PROGRESS NOTES
Patient ID: Richard De La Cruz is a 63 y o  female  Assessment/Plan:           Problem List Items Addressed This Visit        Cardiovascular and Mediastinum    Migraine       Nervous and Auditory    Multiple sclerosis, relapsing-remitting (HCC) - Primary    Relevant Medications    methylPREDNISolone 4 MG tablet therapy pack    zolpidem (AMBIEN) 5 mg tablet       Other    Vitamin D deficiency    Anxiety and depression    Relevant Medications    zolpidem (AMBIEN) 5 mg tablet      Other Visit Diagnoses     Neuropathic pain        Chronic midline low back pain without sciatica        Relevant Medications    methylPREDNISolone 4 MG tablet therapy pack            Mrs Miquel Bell has presented to  [address] [place] [address] for follow-up on multiple sclerosis and related issues  Since last office visit, patient described no new focal neurological dysfunction considering her chronic multiple sclerosis:  - lower back pain has been flaring up now with Medrol pack advised in addition to baclofen 10 mg HS; patient ambulates with no assistive devices;  - patient was advised to decrease dose to Dimethyl Fumarate 240 mg once at night for 3 months, then if no worsening MS symptoms, patient is to proceed with Dimethyl fumarate every other day and stop- immunosenescence and natural MS history was discussed  Patient had imaging brain and spine in July 2021 and stable with stable clinical findings reported  Patient is off gabapentin - no sensory dysfunction noted in hands  Patient has worsening migraine with pain reported 5/10 in severity, ketorolac 30 mg IM was provided  Patient is to follow with [facility] in 6 months     Subjective: dizziness, light heaedness, and headache  HPI  Mrs Miquel Bell has presented to [address] [place] [address] [address] for follow-up on multiple sclerosis related issues        Patient described experiencing headaches 3 times a week with throbbing pressure reported in bifrontal bitemporal and occipital area which interferes with concentration requiring patient to stay in a dark room, patient describes sensitivity to light and sound  Patient stated stress has triggering her headache  Patient has been taking dimethyl fumarate with no new focal neurological deficit has been reported, no new sensory dysfunction has been described  Intermittent headache reported as 5/10 severity; sumatriptan provides some benefits  Patient had significant injection reaction to glatiramer acetate 40 mg  Patient has been currently taking dimethyl fumarate  Patient had completed imaging in July 2021 with stable radiographic findings has been reported  Serologic workup was completed with absolute lymphocyte count is 1 32  Patient responded well to Ambien            The following portions of the patient's history were reviewed and updated as appropriate:   She  has a past medical history of Asthma, Asthma with COPD ([facility] [address] ), Cl[facility] of fourth lumbar vertebra ([facility] ), COPD (chronic obstructive pulmonary disease) ([facility] ), Heart murmur, Laceration of finger, Lipoma, Migraine, Multiple sclerosis ([facility] ), Obstructive sleep apnea, Osteoarthritis, Post traumatic stress disorder, Tenosynovitis of thumb, and Weight gain  She   Patient Active Problem List    Diagnosis Date Noted   • Bipolar 1 disorder ([facility] ) 08/23/2018   • Chronic pain syndrome 05/10/2018   • Degenerative lumbar spinal stenosis 11/14/2017   • Anxiety and depression 11/14/2017   • Elevated LDL cholesterol level 09/01/2017   • PTSD (post-traumatic stress disorder) 07/31/2017   • Chronic pain disorder 07/31/2017   • Multiple sclerosis, relapsing-remitting ([facility] ) 03/16/2017   • Migraine 03/16/2017   • Vitamin D deficiency 12/12/2016   • Asthma with COPD ([facility] ) 01/08/2015   • Fibromyalgia 05/04/2012     She  has a past surgical history that includes Knee arthroscopy w/ meniscal repair (Right); Finger surgery; and Abscess drainage (06/27/2022)    Her family history includes Asthma in her brother and father; COPD in her father; Diabetes in her father; Hepatitis in her mother; Hypertension in her father  She  reports that she has been smoking cigarettes  She has a 6 25 pack-year smoking history  She has never used smokeless tobacco  She reports previous alcohol use  She reports that she does not use drugs    Current Outpatient Medications   Medication Sig Dispense Refill   • albuterol (2 5 mg/3 mL) 0 083 % nebulizer solution Take 1 vial (2 5 mg total) by nebulization every 4 (four) hours as needed for wheezing or shortness of breath 25 vial 5   • baclofen 10 mg tablet TAKE 1 TABLET (10 MG TOTAL) BY MOUTH DAILY AT BEDTIME AS NEEDED FOR MUSCLE SPASMS 30 tablet 3   • cetirizine (ZyrTEC) 10 mg tablet TAKE 1 TABLET BY MOUTH EVERY DAY 30 tablet 2   • Dimethyl Fumarate 240 MG CPDR Take 1 capsule (240 mg total) by mouth 2 (two) times a day 60 capsule 11   • Dulera 100-5 MCG/ACT inhaler INHALE 2 PUFFS BY MOUTH 2 TIMES A DAY RINSE MOUTH AFTER USE  13 g 5   • FLUoxetine (PROzac) 10 mg capsule TAKE 1 CAPSULE BY MOUTH EVERY DAY 90 capsule 0   • methylPREDNISolone 4 MG tablet therapy pack Use as directed on package 1 each 0   • SUMAtriptan (IMITREX) 100 mg tablet TAKE 1 TABLET (100 MG TOTAL) BY MOUTH ONCE AS NEEDED FOR MIGRAINE FOR UP TO 1 DOSE 9 tablet 0   • Ventolin  (90 Base) MCG/ACT inhaler Inhale 2 puffs every 4 (four) hours as needed for wheezing 18 g 5   • zolpidem (AMBIEN) 5 mg tablet Take 1 tablet (5 mg total) by mouth daily at bedtime as needed for sleep 90 tablet 3   • brompheniramine-pseudoephedrine-DM 30-2-10 MG/5ML syrup Take 10 mL by mouth 4 (four) times a day as needed for allergies (Patient not taking: Reported on 10/5/2022) 180 mL 0   • oxyCODONE-acetaminophen (Percocet) 5-325 mg per tablet Take 1 tablet by mouth every 4 (four) hours as needed for moderate pain for up to 6 doses Max Daily Amount: 6 tablets (Patient not taking: Reported on 10/5/2022) 6 tablet 0   • oxyCODONE-acetaminophen (Percocet) 5-325 mg per tablet Take 1 tablet by mouth every 4 (four) hours as needed for severe pain Max Daily Amount: 6 tablets (Patient not taking: Reported on 10/5/2022) 12 tablet 0     No current facility-administered medications for this visit  Current Outpatient Medications on File Prior to Visit   Medication Sig   • albuterol (2 5 mg/3 mL) 0 083 % nebulizer solution Take 1 vial (2 5 mg total) by nebulization every 4 (four) hours as needed for wheezing or shortness of breath   • baclofen 10 mg tablet TAKE 1 TABLET (10 MG TOTAL) BY MOUTH DAILY AT BEDTIME AS NEEDED FOR MUSCLE SPASMS   • cetirizine (ZyrTEC) 10 mg tablet TAKE 1 TABLET BY MOUTH EVERY DAY   • Dimethyl Fumarate 240 MG CPDR Take 1 capsule (240 mg total) by mouth 2 (two) times a day   • Dulera 100-5 MCG/ACT inhaler INHALE 2 PUFFS BY MOUTH 2 TIMES A DAY RINSE MOUTH AFTER USE    • FLUoxetine (PROzac) 10 mg capsule TAKE 1 CAPSULE BY MOUTH EVERY DAY   • SUMAtriptan (IMITREX) 100 mg tablet TAKE 1 TABLET (100 MG TOTAL) BY MOUTH ONCE AS NEEDED FOR MIGRAINE FOR UP TO 1 DOSE   • Ventolin  (90 Base) MCG/ACT inhaler Inhale 2 puffs every 4 (four) hours as needed for wheezing   • brompheniramine-pseudoephedrine-DM 30-2-10 MG/5ML syrup Take 10 mL by mouth 4 (four) times a day as needed for allergies (Patient not taking: Reported on 10/5/2022)   • oxyCODONE-acetaminophen (Percocet) 5-325 mg per tablet Take 1 tablet by mouth every 4 (four) hours as needed for moderate pain for up to 6 doses Max Daily Amount: 6 tablets (Patient not taking: Reported on 10/5/2022)   • oxyCODONE-acetaminophen (Percocet) 5-325 mg per tablet Take 1 tablet by mouth every 4 (four) hours as needed for severe pain Max Daily Amount: 6 tablets (Patient not taking: Reported on 10/5/2022)     No current facility-administered medications on file prior to visit  She is allergic to no known allergies            Objective:    Blood pressure 126/74, pulse 73, temperature (!) 97 4 °F (36 3 °C), temperature source Skin, height 5' 1" (1 549 m), weight 88 kg (194 lb)  Physical Exam    Neurological Exam    Gait    25 foot walk 10 19 seconds  CONSTITUTIONAL: NAD, pleasant  NECK: supple, no lymphadenopathy, no thyromegaly, no JVD  CARDIOVASCULAR: RRR, normal S1S2, no murmurs, no rubs  RESP: clear to auscultation bilaterally, no wheezes/rhonchi/rales  ABDOMEN: soft, non tender, non distended  SKIN: no rash or skin lesions  EXTREMITIES: no edema, pulses 2+bilaterally  PSYCH: appropriate mood and affect  NEUROLOGIC COMPREHENSIVE EXAM: Patient is oriented to person, place and time, NAD; appropriate affect  CN II, III, IV, V, VI, VII,VIII,IX,X,XI-XII intact with EOMI, PERRLA, OKN intact, VF grossly intact, fundi poorly visualized secondary to pupillary constriction; symmetric face noted  Motor: 5/5 UE/LE bilateral symmetric; Sensory: intact to light touch and pinprick bilaterally; normal vibration sensation feet bilaterally; Coordination within normal limits on FTN and MARTIN testing; DTR: 2/4 through, no Babinski, no clonus  Tandem gait is intact  Romberg: absent  ROS:   12 points of review of system was reviewed with the patient and was unremarkable with exception: see HPI  Review of Systems   Constitutional: Negative  Negative for appetite change and fever  HENT: Negative  Negative for hearing loss, tinnitus, trouble swallowing and voice change  Eyes: Negative  Negative for photophobia and pain  Respiratory: Negative  Negative for shortness of breath  Cardiovascular: Negative  Negative for palpitations  Gastrointestinal: Negative  Negative for nausea and vomiting  Endocrine: Negative  Negative for cold intolerance  Genitourinary: Negative  Negative for dysuria, frequency and urgency  Musculoskeletal: Negative  Negative for myalgias and neck pain  Skin: Negative  Negative for rash     Neurological: Positive for dizziness, light-headedness and headaches  Negative for tremors, seizures, syncope, facial asymmetry, speech difficulty, weakness and numbness  Hematological: Negative  Does not bruise/bleed easily  Psychiatric/Behavioral: Negative  Negative for confusion, hallucinations and sleep disturbance

## 2022-10-06 ENCOUNTER — TELEPHONE (OUTPATIENT)
Dept: NEUROLOGY | Facility: CLINIC | Age: 62
End: 2022-10-06

## 2022-10-06 NOTE — TELEPHONE ENCOUNTER
pt left vm stating that she saw dr campuzano yesterday and she called in a script for her but it is only enough for 6 days, states that she takes this med on a daily basis    asking for a call back  [phone]

## 2022-10-10 NOTE — TELEPHONE ENCOUNTER
Patient left voicemail requesting a call back  Did not state what it was in regards to  Please call back

## 2022-10-14 NOTE — TELEPHONE ENCOUNTER
Spoke with pt  Clarified dimethyl fumarate is an MS DMT and should be taken BID  Advised methylprednisolone was prescribed for lower back pain  Pt reports she completed course of methylprednisolone  This was somewhat helpful for her pain while she was taking the medication, however, now that the tape is complete the pain is back  Reports constant pain "like a knife is sticking in [her] back " Pain is in the center of her lower back  Asking if there is a medication she can continue to take on a daily basis to help with this pain  Please advise  [phone]  Okay to leave detailed message

## 2022-10-14 NOTE — TELEPHONE ENCOUNTER
Answering in Dr KRAFT absence  Looks like from Oct 4th notes, pt is now to be on tapering regimen of the dimethyl fumarate  Looks like daily for 3 months, to every other and then off  See full note for specific Dr kraft instructions  From back standpoint, looks like separate issue     If ongoing back issues after trial with medrol and still now symptomatic, rec pt to touch base with pcp for possible ortho/ pain mx eval

## 2022-10-14 NOTE — TELEPHONE ENCOUNTER
Spoke with Dr Isabela Anderson- per Dr KRAFT's office note, "patient was advised to decrease dose to Dimethyl Fumarate 240 mg once at night for 3 months, then if no worsening MS symptoms, patient is to proceed with Dimethyl fumarate every other day and stop- immunosenescence and natural MS history was discussed " Had reviewed dosing earlier with pt per previously sent Rx  Reviewed the above instructions with the patient regarding dimethyl fumarate  Pt verbalizes understanding  Also reviewed Dr Hamilton's message  Pt agreeable

## 2022-11-01 DIAGNOSIS — J44.9 ASTHMA WITH COPD (HCC): ICD-10-CM

## 2022-11-01 RX ORDER — CETIRIZINE HYDROCHLORIDE 10 MG/1
TABLET ORAL
Qty: 30 TABLET | Refills: 2 | Status: SHIPPED | OUTPATIENT
Start: 2022-11-01

## 2022-11-10 DIAGNOSIS — G35 MULTIPLE SCLEROSIS, RELAPSING-REMITTING (HCC): Primary | ICD-10-CM

## 2022-11-10 DIAGNOSIS — M79.2 NEUROPATHIC PAIN: ICD-10-CM

## 2022-11-10 NOTE — TELEPHONE ENCOUNTER
Recberenice LANCASTER    This is Yesica Greene  YOB: 1960  I need a new script called in for gapapentin ASAP, please  I need it  My number is [phone]  Thank you      Patient of Dr Nida Garces

## 2022-11-10 NOTE — TELEPHONE ENCOUNTER
Per chart review, last gabapentin Rx was cancelled  Office note states "Patient is off gabapentin - no sensory dysfunction noted in hands "    Spoke with pt  She reports she had stopped taking gabapentin and now realizes that she still needs this medication  Feels gabapentin was helpful when she was taking this  Now reports constant back pain as well as occasional numbness and tingling in her feet without gabapentin  Pt would like to return to previous dosing  She was taking gabapentin 300mg caps, 1 cap in the AM and at noon with 2 caps at night  Pt would like this sent to [facility]  Asking for call back once sent  Please review and sign if agreeable

## 2022-11-11 RX ORDER — GABAPENTIN 300 MG/1
CAPSULE ORAL
Qty: 120 CAPSULE | Refills: 5 | Status: SHIPPED | OUTPATIENT
Start: 2022-11-11

## 2022-11-18 DIAGNOSIS — J44.9 ASTHMA WITH COPD (HCC): ICD-10-CM

## 2022-11-21 DIAGNOSIS — G35 MULTIPLE SCLEROSIS, RELAPSING-REMITTING (HCC): ICD-10-CM

## 2022-11-21 NOTE — TELEPHONE ENCOUNTER
Pt left message  I am calling if someone send my MS medicine to the pharmacy,because I haven't heard from the pharmacy    CB#[phone]

## 2022-11-22 RX ORDER — DIMETHYL FUMARATE 240 MG/1
240 CAPSULE ORAL
Qty: 30 CAPSULE | Refills: 3 | Status: SHIPPED | OUTPATIENT
Start: 2022-11-22

## 2022-11-22 NOTE — TELEPHONE ENCOUNTER
Spoke with pt  She reports her dimethyl fumarate was changed and she is not sure what she is supposed to be taking  Reviewed per last office note, "patient was advised to decrease dose to Dimethyl Fumarate 240 mg once at night for 3 months, then if no worsening MS symptoms, patient is to proceed with Dimethyl fumarate every other day and stop " Pt verbalizes understanding  She is asking for updated Rx to be sent to perform specialty pharmacy  Please review and sign if agreeable

## 2022-12-16 ENCOUNTER — OFFICE VISIT (OUTPATIENT)
Dept: FAMILY MEDICINE CLINIC | Facility: CLINIC | Age: 62
End: 2022-12-16

## 2022-12-16 VITALS
TEMPERATURE: 99.1 F | BODY MASS INDEX: 35.57 KG/M2 | SYSTOLIC BLOOD PRESSURE: 120 MMHG | WEIGHT: 188.4 LBS | OXYGEN SATURATION: 97 % | HEART RATE: 123 BPM | HEIGHT: 61 IN | RESPIRATION RATE: 16 BRPM | DIASTOLIC BLOOD PRESSURE: 70 MMHG

## 2022-12-16 DIAGNOSIS — R68.89 FLU-LIKE SYMPTOMS: Primary | ICD-10-CM

## 2022-12-16 DIAGNOSIS — F31.9 BIPOLAR 1 DISORDER (HCC): ICD-10-CM

## 2022-12-16 DIAGNOSIS — J44.9 ASTHMA WITH COPD (HCC): ICD-10-CM

## 2022-12-16 DIAGNOSIS — Z20.822 ENCOUNTER FOR LABORATORY TESTING FOR COVID-19 VIRUS: ICD-10-CM

## 2022-12-16 RX ORDER — FLUOXETINE 10 MG/1
CAPSULE ORAL
Qty: 90 CAPSULE | Refills: 0 | Status: SHIPPED | OUTPATIENT
Start: 2022-12-16

## 2022-12-16 RX ORDER — OSELTAMIVIR PHOSPHATE 75 MG/1
75 CAPSULE ORAL EVERY 12 HOURS SCHEDULED
Qty: 10 CAPSULE | Refills: 0 | Status: SHIPPED | OUTPATIENT
Start: 2022-12-16 | End: 2022-12-21

## 2022-12-16 NOTE — PATIENT INSTRUCTIONS
Use tylenol up to 4 g per day q 4 hrs  You can also use motrin as needed for fevers, chills and body aches  Empirically treat with tamiflu  Covid and flu tested today  Drink plenty of fluid  Urine should run clear  Continue delsym  Recommend vit c,d and zinc    Continue albutrol as needed  Call with worsening symptoms

## 2022-12-17 LAB
FLUAV RNA RESP QL NAA+PROBE: NEGATIVE
FLUBV RNA RESP QL NAA+PROBE: NEGATIVE
SARS-COV-2 RNA RESP QL NAA+PROBE: POSITIVE

## 2022-12-19 ENCOUNTER — TELEPHONE (OUTPATIENT)
Dept: FAMILY MEDICINE CLINIC | Facility: CLINIC | Age: 62
End: 2022-12-19

## 2022-12-19 NOTE — TELEPHONE ENCOUNTER
Spoke with Rosas Yan about positive covid  Her symptoms have resolved  Discontinue tamiflu and no need for paxlovid at this time

## 2022-12-22 ENCOUNTER — TELEPHONE (OUTPATIENT)
Dept: FAMILY MEDICINE CLINIC | Facility: CLINIC | Age: 62
End: 2022-12-22

## 2022-12-22 ENCOUNTER — OFFICE VISIT (OUTPATIENT)
Dept: FAMILY MEDICINE CLINIC | Facility: CLINIC | Age: 62
End: 2022-12-22

## 2022-12-22 VITALS
OXYGEN SATURATION: 97 % | WEIGHT: 190 LBS | BODY MASS INDEX: 35.87 KG/M2 | HEIGHT: 61 IN | HEART RATE: 78 BPM | TEMPERATURE: 98 F | DIASTOLIC BLOOD PRESSURE: 80 MMHG | RESPIRATION RATE: 16 BRPM | SYSTOLIC BLOOD PRESSURE: 132 MMHG

## 2022-12-22 DIAGNOSIS — U07.1 COVID-19: Primary | ICD-10-CM

## 2022-12-22 DIAGNOSIS — B99.9 SUPERINFECTION: ICD-10-CM

## 2022-12-22 DIAGNOSIS — J44.9 ASTHMA WITH COPD (HCC): ICD-10-CM

## 2022-12-22 RX ORDER — PREDNISONE 20 MG/1
40 TABLET ORAL DAILY
Qty: 10 TABLET | Refills: 0 | Status: SHIPPED | OUTPATIENT
Start: 2022-12-22 | End: 2022-12-27

## 2022-12-22 RX ORDER — DOXYCYCLINE HYCLATE 100 MG
100 TABLET ORAL 2 TIMES DAILY
Qty: 10 TABLET | Refills: 0 | Status: SHIPPED | OUTPATIENT
Start: 2022-12-22 | End: 2022-12-27

## 2022-12-22 NOTE — ASSESSMENT & PLAN NOTE
Treated for a superimposed bacterial infection with doxy bid for 5 days  Due to wheezing at night pt also requested a steroid burst  Given today  ER precautions given

## 2022-12-22 NOTE — PROGRESS NOTES
Assessment/Plan:    COVID-19  Treated for a superimposed bacterial infection with doxy bid for 5 days  Due to wheezing at night pt also requested a steroid burst  Given today  ER precautions given  Diagnoses and all orders for this visit:    COVID-19    Asthma with COPD ([facility] [address] )  -     predniSONE 20 mg tablet; Take 2 tablets (40 mg total) by mouth daily for 5 days    Superinfection  -     doxycycline hyclate (VIBRA-TABS) 100 mg tablet; Take 1 tablet (100 mg total) by mouth 2 (two) times a day for 5 days        Subjective: worsening symptoms      Patient ID: Brendan Banks is a 63 y o  female  HPI  Jing Juares started having symptoms 12/15 and tested positive for covid she was outside of the window for treatment and symptoms resolved  She is here today because the symptoms suddenly worsened and she is having shortness of breath, chest tightness, wheezing at night and fevers  The following portions of the patient's history were reviewed and updated as appropriate: allergies, current medications, past family history, past medical history, past social history, past surgical history and problem list     Review of Systems   Constitutional: Positive for fever  Negative for unexpected weight change  HENT: Negative for ear pain, sore throat and trouble swallowing  Eyes: Negative for pain and visual disturbance  Respiratory: Positive for cough, chest tightness, shortness of breath and wheezing  Cardiovascular: Negative for chest pain  Gastrointestinal: Negative for abdominal distention, abdominal pain, blood in stool, constipation, diarrhea, nausea and vomiting  Endocrine: Negative for polydipsia and polyuria  Genitourinary: Negative for dysuria and hematuria  Musculoskeletal: Negative for back pain and myalgias  Skin: Negative for rash  Neurological: Negative for syncope and headaches  Psychiatric/Behavioral: Negative for suicidal ideas           PHQ-2/9 Depression Screening [unfilled]    Objective:      /80 (BP Location: Left arm, Patient Position: Sitting, Cuff Size: Adult)   Pulse 78   Temp 98 °F (36 7 °C) (Tympanic)   Resp 16   Ht 5' 1" (1 549 m)   Wt 86 2 kg (190 lb)   LMP  (LMP Unknown)   SpO2 97%   BMI 35 90 kg/m²          Physical Exam  Constitutional:       Appearance: She is well-developed  HENT:      Head: Normocephalic and atraumatic  Right Ear: External ear normal       Left Ear: External ear normal       Mouth/Throat:      Pharynx: No oropharyngeal exudate  Eyes:      General: No scleral icterus  Conjunctiva/sclera: Conjunctivae normal       Pupils: Pupils are equal, round, and reactive to light  Cardiovascular:      Rate and Rhythm: Normal rate and regular rhythm  Heart sounds: No murmur heard  No friction rub  No gallop  Pulmonary:      Effort: Pulmonary effort is normal  No respiratory distress  Breath sounds: Normal breath sounds  No wheezing or rales  Abdominal:      General: Bowel sounds are normal  There is no distension  Palpations: Abdomen is soft  There is no mass  Tenderness: There is no abdominal tenderness  There is no rebound  Musculoskeletal:         General: Normal range of motion  Cervical back: Normal range of motion and neck supple  Skin:     General: Skin is warm and dry  Neurological:      Mental Status: She is alert and oriented to person, place, and time

## 2022-12-22 NOTE — TELEPHONE ENCOUNTER
Complains of chills, fever cough w yellow phlegm,congestion, headache, wheezing   She took all her meds with no relief she said she feels worse      Is there anything she can take to relieve some of these symptoms

## 2023-01-14 DIAGNOSIS — G43.909 MIGRAINE WITHOUT STATUS MIGRAINOSUS, NOT INTRACTABLE, UNSPECIFIED MIGRAINE TYPE: ICD-10-CM

## 2023-01-14 RX ORDER — SUMATRIPTAN 100 MG/1
100 TABLET, FILM COATED ORAL ONCE AS NEEDED
Qty: 9 TABLET | Refills: 1 | Status: SHIPPED | OUTPATIENT
Start: 2023-01-14

## 2023-01-31 DIAGNOSIS — J44.9 ASTHMA WITH COPD (HCC): ICD-10-CM

## 2023-01-31 DIAGNOSIS — G35 MULTIPLE SCLEROSIS, RELAPSING-REMITTING (HCC): ICD-10-CM

## 2023-01-31 DIAGNOSIS — M79.2 NEUROPATHIC PAIN: ICD-10-CM

## 2023-01-31 RX ORDER — BACLOFEN 10 MG/1
10 TABLET ORAL
Qty: 30 TABLET | Refills: 3 | Status: SHIPPED | OUTPATIENT
Start: 2023-01-31

## 2023-01-31 RX ORDER — CETIRIZINE HYDROCHLORIDE 10 MG/1
TABLET ORAL
Qty: 30 TABLET | Refills: 2 | Status: SHIPPED | OUTPATIENT
Start: 2023-01-31

## 2023-02-10 ENCOUNTER — TELEPHONE (OUTPATIENT)
Dept: NEUROLOGY | Facility: CLINIC | Age: 63
End: 2023-02-10

## 2023-02-10 NOTE — TELEPHONE ENCOUNTER
Pt left voicemail stating PA is needed for specialty medication  [phone]    Per chart review, it appears new PA is needed for dimethyl fumarate  PA submitted on [facility], Key: [id number]  Awaiting determination  Called pt, provided her with update

## 2023-02-20 ENCOUNTER — HOSPITAL ENCOUNTER (EMERGENCY)
Facility: HOSPITAL | Age: 63
Discharge: HOME | End: 2023-02-20
Attending: EMERGENCY MEDICINE

## 2023-02-20 ENCOUNTER — APPOINTMENT (EMERGENCY)
Dept: RADIOLOGY | Facility: HOSPITAL | Age: 63
End: 2023-02-20

## 2023-02-20 ENCOUNTER — VBI (OUTPATIENT)
Dept: ADMINISTRATIVE | Facility: OTHER | Age: 63
End: 2023-02-20

## 2023-02-20 VITALS
RESPIRATION RATE: 18 BRPM | HEART RATE: 74 BPM | OXYGEN SATURATION: 97 % | TEMPERATURE: 97.8 F | SYSTOLIC BLOOD PRESSURE: 127 MMHG | DIASTOLIC BLOOD PRESSURE: 75 MMHG

## 2023-02-20 DIAGNOSIS — S20.211A RIB CONTUSION, RIGHT, INITIAL ENCOUNTER: Primary | ICD-10-CM

## 2023-02-20 RX ORDER — HYDROCODONE BITARTRATE AND ACETAMINOPHEN 5; 325 MG/1; MG/1
1 TABLET ORAL EVERY 6 HOURS PRN
Qty: 8 TABLET | Refills: 0 | Status: SHIPPED | OUTPATIENT
Start: 2023-02-20 | End: 2023-02-23

## 2023-02-20 RX ORDER — LIDOCAINE 50 MG/G
1 PATCH TOPICAL ONCE
Status: DISCONTINUED | OUTPATIENT
Start: 2023-02-20 | End: 2023-02-20 | Stop reason: HOSPADM

## 2023-02-20 RX ORDER — LIDOCAINE 50 MG/G
1 PATCH TOPICAL DAILY
Qty: 5 PATCH | Refills: 0 | Status: SHIPPED | OUTPATIENT
Start: 2023-02-20 | End: 2023-02-25

## 2023-02-20 RX ORDER — KETOROLAC TROMETHAMINE 30 MG/ML
15 INJECTION, SOLUTION INTRAMUSCULAR; INTRAVENOUS ONCE
Status: COMPLETED | OUTPATIENT
Start: 2023-02-20 | End: 2023-02-20

## 2023-02-20 RX ADMIN — KETOROLAC TROMETHAMINE 15 MG: 30 INJECTION, SOLUTION INTRAMUSCULAR; INTRAVENOUS at 11:27

## 2023-02-20 RX ADMIN — LIDOCAINE 5% 1 PATCH: 700 PATCH TOPICAL at 11:27

## 2023-02-20 NOTE — ED PROVIDER NOTES
History  Chief Complaint   Patient presents with   • Rib Pain     Pt states yesterday she leaned into a cooler and felt a pop under her r breast, now c/o 10/10 r sided rib pain that worsens with inspiration      53-year-old female presents to emergency room for evaluation of right-sided rib pain  Dates yesterday while shopping at the grocery store she leaned into a chest freezer to reach something farther away when she heard a pop along her ribs causing instant pain  Pain is worse with movement and taking a deep breath  She denies shortness of breath or chest pain  Took over-the-counter pain medicine without relief  She has a history of asthma and MS,  Both of which have been stable  She is not on chronic steroids      History provided by:  Patient      Prior to Admission Medications   Prescriptions Last Dose Informant Patient Reported? Taking? Dimethyl Fumarate 240 MG CPDR   No No   Sig: Take 1 capsule (240 mg total) by mouth daily at bedtime For 3 months  Then take 1 capsule every other day if no worsening symptoms  Dulera 100-5 MCG/ACT inhaler   No No   Sig: INHALE 2 PUFFS BY MOUTH 2 TIMES A DAY RINSE MOUTH AFTER USE     FLUoxetine (PROzac) 10 mg capsule   No No   Sig: TAKE 1 CAPSULE BY MOUTH EVERY DAY   SUMAtriptan (IMITREX) 100 mg tablet   No No   Sig: TAKE 1 TABLET (100 MG TOTAL) BY MOUTH ONCE AS NEEDED FOR MIGRAINE FOR UP TO 1 DOSE   Ventolin  (90 Base) MCG/ACT inhaler   No No   Sig: TAKE 2 PUFFS BY MOUTH EVERY 4 HOURS AS NEEDED FOR WHEEZE   albuterol (2 5 mg/3 mL) 0 083 % nebulizer solution   No No   Sig: Take 1 vial (2 5 mg total) by nebulization every 4 (four) hours as needed for wheezing or shortness of breath   baclofen 10 mg tablet   No No   Sig: TAKE 1 TABLET (10 MG TOTAL) BY MOUTH DAILY AT BEDTIME AS NEEDED FOR MUSCLE SPASMS   brompheniramine-pseudoephedrine-DM 30-2-10 MG/5ML syrup   No No   Sig: Take 10 mL by mouth 4 (four) times a day as needed for allergies   Patient not taking: Reported on 10/5/2022   cetirizine (ZyrTEC) 10 mg tablet   No No   Sig: TAKE 1 TABLET BY MOUTH EVERY DAY   gabapentin (NEURONTIN) 300 mg capsule   No No   Sig: TAKE 1 CAPS IN THE MORNING AND NOON AND 2 CAPS BEFORE BEDTIME   oxyCODONE-acetaminophen (Percocet) 5-325 mg per tablet   No No   Sig: Take 1 tablet by mouth every 4 (four) hours as needed for moderate pain for up to 6 doses Max Daily Amount: 6 tablets   Patient not taking: Reported on 10/5/2022   oxyCODONE-acetaminophen (Percocet) 5-325 mg per tablet   No No   Sig: Take 1 tablet by mouth every 4 (four) hours as needed for severe pain Max Daily Amount: 6 tablets   Patient not taking: Reported on 10/5/2022   zolpidem (AMBIEN) 5 mg tablet   No No   Sig: Take 1 tablet (5 mg total) by mouth daily at bedtime as needed for sleep      Facility-Administered Medications: None       Past Medical History:   Diagnosis Date   • Asthma    • [facility] with COPD ([facility] )     last assessed-9/1/2017   • Closed fracture of fourth lumbar vertebra ([facility] )     unspecified fracture morphology, initial mjqnkwkei-ojzjtuzi-6/31/2017   • COPD (chronic obstructive pulmonary disease) ([facility])    • Heart murmur     last assessed-5/4/2012   • Laceration of finger     last assessed-2/26/2014   • Lipoma     last assessed-5/3/2013   • Migraine     last assessed-1/16/2013   • Multiple sclerosis (HCC)     last assessed-11/14/20178376-gddklwhml-vezjcjfti   • Obstructive sleep apnea     last assessed-9/23/2013   • Osteoarthritis     last assessed-5/4/2012   • Post traumatic stress disorder     last assessed-12/13/2013   • Tenosynovitis of thumb     left thumb-septic-last assessed-3/26/2014   • Weight gain     last assessed-10/17/2013-50lb in about a year with frequent steroids       Past Surgical History:   Procedure Laterality Date   • ABCESS DRAINAGE  06/27/2022   • FINGER SURGERY     • KNEE ARTHROSCOPY W/ MENISCAL REPAIR Right     description: 4/2012-KNEE ARTHROSCOPY W/ MEDIAL MENISCAL REPAIR        Family History   Problem Relation Age of Onset   • Hepatitis Mother         C   • Asthma Father    • COPD Father    • Diabetes Father    • Hypertension Father    • Asthma Brother      I have reviewed and agree with the history as documented  E-Cigarette/Vaping   • E-Cigarette Use Never User      E-Cigarette/Vaping Substances   • THC No    • CBD No      Social History     Tobacco Use   • Smoking status: Former     Packs/day: 0 25     Years: 25 00     Pack years: 6 25     Types: Cigarettes     Start date: 12/2/2022   • Smokeless tobacco: Never   Vaping Use   • Vaping Use: Never used   Substance Use Topics   • Alcohol use: Not Currently   • Drug use: No       Review of Systems   Constitutional: Negative for chills and fever  Respiratory: Negative for shortness of breath  Cardiovascular: Negative for chest pain  Gastrointestinal: Negative for abdominal pain, nausea and vomiting  Musculoskeletal: Negative for back pain and neck pain  Right rib pain   Skin: Negative for rash and wound  Physical Exam  Physical Exam  Vitals and nursing note reviewed  Constitutional:       Appearance: Normal appearance  She is well-developed  HENT:      Head: Normocephalic and atraumatic  Right Ear: External ear normal       Left Ear: External ear normal    Eyes:      Conjunctiva/sclera: Conjunctivae normal    Cardiovascular:      Rate and Rhythm: Normal rate and regular rhythm  Heart sounds: Normal heart sounds  Pulmonary:      Effort: Pulmonary effort is normal  No respiratory distress  Breath sounds: Normal breath sounds  No wheezing  Chest:      Chest wall: Tenderness present  No crepitus  Abdominal:      General: Bowel sounds are normal  There is no distension  Palpations: Abdomen is soft  Tenderness: There is no abdominal tenderness  Musculoskeletal:      Cervical back: Normal and neck supple  Thoracic back: Normal    Skin:     General: Skin is warm and dry        Findings: No rash  Neurological:      Mental Status: She is alert and oriented to person, place, and time  Psychiatric:         Mood and Affect: Mood normal          Vital Signs  ED Triage Vitals   Temperature Pulse Respirations Blood Pressure SpO2   02/20/23 1044 02/20/23 1044 02/20/23 1044 02/20/23 1044 02/20/23 1044   97 8 °F (36 6 °C) 74 18 127/75 97 %      Temp Source Heart Rate Source Patient Position - Orthostatic VS BP Location FiO2 (%)   02/20/23 1044 02/20/23 1044 02/20/23 1044 02/20/23 1044 --   Oral Monitor Lying Right arm       Pain Score       02/20/23 1127       10 - Worst Possible Pain           Vitals:    02/20/23 1044   BP: 127/75   Pulse: 74   Patient Position - Orthostatic VS: Lying         Visual Acuity      ED Medications  Medications   lidocaine (LIDODERM) 5 % patch 1 patch (1 patch Topical Medication Applied 2/20/23 1127)   ketorolac (TORADOL) injection 15 mg (15 mg Intramuscular Given 2/20/23 1127)       Diagnostic Studies  Results Reviewed     None                 XR ribs with pa chest min 3 views RIGHT   Final Result by Ana Mann MD (02/20 1210)      No active cardiopulmonary disease  No evidence of rib fractures  Findings are stable      Workstation performed: [id number]                    Procedures  Procedures         ED Course  ED Course as of 02/20/23 1245   Mon Feb 20, 2023   1148 Is feeling much better, states she can take a deep breath without significant pain, awaiting final x-ray report from radiologist                                              Medical Decision Making  Differential diagnosis includes but is not limited to: Rib fracture, muscle strain, pneumothorax - chest x-ray ordered    Incentive spirometer given and educated patient on importance of expanding her lungs to avoid developing pneumonia    I reviewed this patient through the [facility] portal and did not find any evidence of narcotic abuse or doctor shopping       Rib contusion, right, initial encounter: acute illness or injury  Amount and/or Complexity of Data Reviewed  Radiology: ordered  Details: final result reviewed      Risk  Prescription drug management  Disposition  Final diagnoses:   Rib contusion, right, initial encounter     Time reflects when diagnosis was documented in both MDM as applicable and the Disposition within this note     Time User Action Codes Description Comment    2/20/2023 12:32 PM Mali Patel Octavio [S20 211A] Rib contusion, right, initial encounter       ED Disposition     ED Disposition   Discharge    Condition   Stable    Date/Time   Mon Feb 20, 2023 12:32 PM    [address] discharge to home/self care  Follow-up Information     Follow up With Specialties Details Why [address], MD Family Medicine In 3 days  [address] Emergency Department Emergency Medicine  If symptoms worsen [address] Emergency Department, [address] [place] [address], [id number] [phone]          Patient's Medications   Discharge Prescriptions    HYDROCODONE-ACETAMINOPHEN (NORCO) 5-325 MG PER TABLET    Take 1 tablet by mouth every 6 (six) hours as needed for pain for up to 3 days Max Daily Amount: 4 tablets       Start Date: 2/20/2023 End Date: 2/23/2023       Order Dose: 1 tablet       Quantity: 8 tablet    Refills: 0    LIDOCAINE (LIDODERM) 5 %    Apply 1 patch topically over 12 hours daily for 5 days Remove & Discard patch within 12 hours or as directed by MD       Start Date: 2/20/2023 End Date: 2/25/2023       Order Dose: 1 patch       Quantity: 5 patch    Refills: 0       No discharge procedures on file      PDMP Review     None          ED Provider  Electronically Signed by           Royce Moore PA-C  02/20/23 [address]

## 2023-02-22 DIAGNOSIS — G35 MULTIPLE SCLEROSIS, RELAPSING-REMITTING (HCC): ICD-10-CM

## 2023-02-23 ENCOUNTER — TELEPHONE (OUTPATIENT)
Dept: NEUROLOGY | Facility: CLINIC | Age: 63
End: 2023-02-23

## 2023-02-23 RX ORDER — DIMETHYL FUMARATE 240 MG/1
240 CAPSULE ORAL
Qty: 30 CAPSULE | Refills: 11 | Status: SHIPPED | OUTPATIENT
Start: 2023-02-23 | End: 2023-02-23

## 2023-04-06 DIAGNOSIS — M79.2 NEUROPATHIC PAIN: ICD-10-CM

## 2023-04-06 DIAGNOSIS — G35 MULTIPLE SCLEROSIS, RELAPSING-REMITTING (HCC): ICD-10-CM

## 2023-04-07 RX ORDER — GABAPENTIN 300 MG/1
CAPSULE ORAL
Qty: 120 CAPSULE | Refills: 2 | Status: SHIPPED | OUTPATIENT
Start: 2023-04-07

## 2023-05-10 ENCOUNTER — VBI (OUTPATIENT)
Dept: ADMINISTRATIVE | Facility: OTHER | Age: 63
End: 2023-05-10

## 2023-05-11 DIAGNOSIS — J44.9 ASTHMA WITH COPD (HCC): ICD-10-CM

## 2023-05-17 DIAGNOSIS — F31.9 BIPOLAR 1 DISORDER (HCC): ICD-10-CM

## 2023-05-18 RX ORDER — FLUOXETINE 10 MG/1
CAPSULE ORAL
Qty: 90 CAPSULE | Refills: 0 | Status: SHIPPED | OUTPATIENT
Start: 2023-05-18

## 2023-05-26 ENCOUNTER — TELEPHONE (OUTPATIENT)
Dept: NEUROLOGY | Facility: CLINIC | Age: 63
End: 2023-05-26

## 2023-05-26 NOTE — TELEPHONE ENCOUNTER
Reminder appt call     Patient is scheduled on 05/31/2023 @ 1130 am with an arrival [facility] [id number] am in the [facility] location with Dr Mitzi Rehman      Patient confirmed appt

## 2023-05-28 ENCOUNTER — HOSPITAL ENCOUNTER (EMERGENCY)
Facility: HOSPITAL | Age: 63
Discharge: HOME | End: 2023-05-28
Attending: EMERGENCY MEDICINE

## 2023-05-28 VITALS
HEART RATE: 91 BPM | WEIGHT: 190 LBS | BODY MASS INDEX: 35.9 KG/M2 | SYSTOLIC BLOOD PRESSURE: 116 MMHG | DIASTOLIC BLOOD PRESSURE: 74 MMHG | RESPIRATION RATE: 18 BRPM | OXYGEN SATURATION: 97 % | TEMPERATURE: 97.6 F

## 2023-05-28 DIAGNOSIS — S61.210A LACERATION OF RIGHT INDEX FINGER: Primary | ICD-10-CM

## 2023-05-28 RX ADMIN — TETANUS TOXOID, REDUCED DIPHTHERIA TOXOID AND ACELLULAR PERTUSSIS VACCINE, ADSORBED 0.5 ML: 5; 2.5; 8; 8; 2.5 SUSPENSION INTRAMUSCULAR at 13:46

## 2023-05-28 NOTE — ED ATTENDING ATTESTATION
5/28/2023  IErwin MD, saw and evaluated the patient  I have discussed the patient with the resident/non-physician practitioner and agree with the resident's/non-physician practitioner's findings, Plan of Care, and MDM as documented in the resident's/non-physician practitioner's note, except where noted  All available labs and Radiology studies were reviewed  I was present for key portions of any procedure(s) performed by the resident/non-physician practitioner and I was immediately available to provide assistance  At this point I agree with the current assessment done in the Emergency Department  I have conducted an independent evaluation of this patient a history and physical is as follows:   Pt cut finger on glass a couple days ago it continues to open each time she bends it  PE: alert small avulsion lac to dorsum of index finger MDM: will splint and glue  ED Course         Critical Care Time  Procedures

## 2023-05-28 NOTE — ED PROVIDER NOTES
History  Chief Complaint   Patient presents with   • Finger Laceration     Pt cut her R index finger on glass 3 days ago  Pt states she bend the finger today and it started bleeding again  Pt denies thinners     Regla Cohen is a 63year-old woman presenting with a right index finger laceration from 3 days ago  It is over her DIP and it started bleeding again today  She reports no foreign body sensation  She had washed it out at the time of injury  She cut it on a piece of glass  Last tetanus was in 2014  Prior to Admission Medications   Prescriptions Last Dose Informant Patient Reported? Taking? Dulera 100-5 MCG/ACT inhaler   No No   Sig: INHALE 2 PUFFS BY MOUTH 2 TIMES A DAY RINSE MOUTH AFTER USE     FLUoxetine (PROzac) 10 mg capsule   No No   Sig: TAKE 1 CAPSULE BY MOUTH EVERY DAY   SUMAtriptan (IMITREX) 100 mg tablet   No No   Sig: TAKE 1 TABLET (100 MG TOTAL) BY MOUTH ONCE AS NEEDED FOR MIGRAINE FOR UP TO 1 DOSE   Ventolin  (90 Base) MCG/ACT inhaler   No No   Sig: TAKE 2 PUFFS BY MOUTH EVERY 4 HOURS AS NEEDED FOR WHEEZE   albuterol (2 5 mg/3 mL) 0 083 % nebulizer solution  Self No No   Sig: Take 1 vial (2 5 mg total) by nebulization every 4 (four) hours as needed for wheezing or shortness of breath   baclofen 10 mg tablet   No No   Sig: TAKE 1 TABLET (10 MG TOTAL) BY MOUTH DAILY AT BEDTIME AS NEEDED FOR MUSCLE SPASMS   brompheniramine-pseudoephedrine-DM 30-2-10 MG/5ML syrup  Self No No   Sig: Take 10 mL by mouth 4 (four) times a day as needed for allergies   Patient not taking: Reported on 10/5/2022   cetirizine (ZyrTEC) 10 mg tablet   No No   Sig: TAKE 1 TABLET BY MOUTH EVERY DAY   gabapentin (NEURONTIN) 300 mg capsule   No No   Sig: TAKE 1 CAPS IN THE MORNING AND NOON AND 2 CAPS BEFORE BEDTIME   lidocaine (Lidoderm) 5 %   No No   Sig: Apply 1 patch topically over 12 hours daily for 5 days Remove & Discard patch within 12 hours or as directed by MD   zolpidem (AMBIEN) 5 mg tablet  Self No No   Sig: Take 1 tablet (5 mg total) by mouth daily at bedtime as needed for sleep      Facility-Administered Medications: None       Past Medical History:   Diagnosis Date   • Asthma    • Asthma with COPD ([email or web address] )     last assessed-9/1/2017   • Closed fracture of fourth lumbar vertebra ([email or web address] )     unspecified fracture morphology, initial bukphdoqe-kzeizxdq-6/31/2017   • COPD (chronic obstructive pulmonary disease) ([facility])    • Heart murmur     last assessed-5/4/2012   • Laceration of finger     last assessed-2/26/2014   • Lipoma     last assessed-5/3/2013   • Migraine     last assessed-1/16/2013   • Multiple sclerosis ([facility])     last assessed-11/14/20175147-ovssokqfl-nijpnenvy   • Obstructive sleep apnea     last assessed-9/23/2013   • Osteoarthritis     last assessed-5/4/2012   • Post traumatic stress disorder     last assessed-12/13/2013   • Tenosynovitis of thumb     left thumb-septic-last assessed-3/26/2014   • Weight gain     last assessed-10/17/2013-50lb in about a year with frequent steroids       Past Surgical History:   Procedure Laterality Date   • ABCESS DRAINAGE  06/27/2022   • FINGER SURGERY     • KNEE ARTHROSCOPY W/ MENISCAL REPAIR Right     description: 4/2012-KNEE ARTHROSCOPY W/ MEDIAL MENISCAL REPAIR        Family History   Problem Relation Age of Onset   • Hepatitis Mother         C   • Asthma Father    • COPD Father    • Diabetes Father    • Hypertension Father    • Asthma Brother      I have reviewed and agree with the history as documented      E-Cigarette/Vaping   • E-Cigarette Use Never User      E-Cigarette/Vaping Substances   • THC No    • CBD No      Social History     Tobacco Use   • Smoking status: Every Day     Packs/day: 0 25     Years: 25 00     Total pack years: 6 25     Types: Cigarettes     Start date: 12/2/2022   • Smokeless tobacco: Never   Vaping Use   • Vaping Use: Never used   Substance Use Topics   • Alcohol use: Not Currently   • Drug use: No        Review of Systems   All other systems reviewed and are negative  Physical Exam  ED Triage Vitals [05/28/23 1244]   Temperature Pulse Respirations Blood Pressure SpO2   97 6 °F (36 4 °C) 91 18 116/74 97 %      Temp Source Heart Rate Source Patient Position - Orthostatic VS BP Location FiO2 (%)   Temporal Monitor Sitting Left arm --      Pain Score       8             Orthostatic Vital Signs  Vitals:    05/28/23 1244   BP: 116/74   Pulse: 91   Patient Position - Orthostatic VS: Sitting       Physical Exam  Vitals and nursing note reviewed  Constitutional:       General: She is not in acute distress  Appearance: She is well-developed  She is not ill-appearing  HENT:      Head: Normocephalic and atraumatic  Right Ear: External ear normal       Left Ear: External ear normal       Nose: Nose normal    Eyes:      Conjunctiva/sclera: Conjunctivae normal    Cardiovascular:      Rate and Rhythm: Normal rate  Pulmonary:      Effort: Pulmonary effort is normal    Musculoskeletal:         General: No deformity  Cervical back: Neck supple  Comments: ROM right index finger intact  Skin:     General: Skin is warm and dry  Comments: 0 25 very superficial laceration over right dorsal DIP  No gross contamination  Bleeding controlled  Neurological:      General: No focal deficit present  Mental Status: She is alert and oriented to person, place, and time  ED Medications  Medications   tetanus-diphtheria-acellular pertussis (BOOSTRIX) IM injection 0 5 mL (0 5 mL Intramuscular Given 5/28/23 1346)       Diagnostic Studies  Results Reviewed     None                 No orders to display         Procedures  Procedures      ED Course                             SBIRT 20yo+    Flowsheet Row Most Recent Value   Initial Alcohol Screen: US AUDIT-C     1  How often do you have a drink containing alcohol? 0 Filed at: 05/28/2023 1241   2   How many drinks containing alcohol do you have on a typical day you are drinking?  0 Filed at: 05/28/2023 1247   3a  Male UNDER 65: How often do you have five or more drinks on one occasion? 0 Filed at: 05/28/2023 1247   3b  FEMALE Any Age, or MALE 65+: How often do you have 4 or more drinks on one occassion? 0 Filed at: 05/28/2023 1247   Audit-C Score 0 Filed at: 05/28/2023 1247   VANI: How many times in the past year have you    Used an illegal drug or used a prescription medication for non-medical reasons? Never Filed at: 05/28/2023 1247                Medical Decision Making  62 yo woman presenting with right index finger laceration  Bleeding now controlled  No foreign body sensation to suggest retained glass  Will update tetanus  Given finger splint to minimize movement while wound heals  Discharged home in stable condition, return precautions given  Risk  Prescription drug management  Disposition  Final diagnoses:   Laceration of right index finger     Time reflects when diagnosis was documented in both MDM as applicable and the Disposition within this note     Time User Action Codes Description Comment    5/28/2023  1:23 PM Randall Maynard Laceration of left index finger     5/28/2023  1:23 PM Jan Gallardo Laceration of left index finger     5/28/2023  1:23 PM James Post Add [[id number] 917R] Laceration of right index finger       ED Disposition     ED Disposition   Discharge    Condition   Stable    Date/Time   Sun May 28, 2023  1:23 PM    Comment   Angela Ashraf discharge to home/self care  Follow-up Information     Follow up With Specialties Details Why [address], MD Family Medicine   [address] Emergency Department Emergency Medicine  If symptoms worsen [address] Emergency Department, [address] [place] [address], [place], [address]          Discharge Medication List as of 5/28/2023  2:02 PM      CONTINUE these medications which have NOT CHANGED    Details   albuterol (2 5 mg/3 mL) 0 083 % nebulizer solution Take 1 vial (2 5 mg total) by nebulization every 4 (four) hours as needed for wheezing or shortness of breath, Starting Fri 10/2/2020, Normal      baclofen 10 mg tablet TAKE 1 TABLET (10 MG TOTAL) BY MOUTH DAILY AT BEDTIME AS NEEDED FOR MUSCLE SPASMS, Starting Tue 1/31/2023, Normal      brompheniramine-pseudoephedrine-DM 30-2-10 MG/5ML syrup Take 10 mL by mouth 4 (four) times a day as needed for allergies, Starting Mon 4/11/2022, Normal      cetirizine (ZyrTEC) 10 mg tablet TAKE 1 TABLET BY MOUTH EVERY DAY, Normal      Dulera 100-5 MCG/ACT inhaler INHALE 2 PUFFS BY MOUTH 2 TIMES A DAY RINSE MOUTH AFTER USE , Normal      FLUoxetine (PROzac) 10 mg capsule TAKE 1 CAPSULE BY MOUTH EVERY DAY, Normal      gabapentin (NEURONTIN) 300 mg capsule TAKE 1 CAPS IN THE MORNING AND NOON AND 2 CAPS BEFORE BEDTIME, Normal      lidocaine (Lidoderm) 5 % Apply 1 patch topically over 12 hours daily for 5 days Remove & Discard patch within 12 hours or as directed by MD, Starting Mon 2/20/2023, Until Sat 2/25/2023, Normal      SUMAtriptan (IMITREX) 100 mg tablet TAKE 1 TABLET (100 MG TOTAL) BY MOUTH ONCE AS NEEDED FOR MIGRAINE FOR UP TO 1 DOSE, Starting Sat 4/15/2023, Normal      Ventolin  (90 Base) MCG/ACT inhaler TAKE 2 PUFFS BY MOUTH EVERY 4 HOURS AS NEEDED FOR WHEEZE, Normal      zolpidem (AMBIEN) 5 mg tablet Take 1 tablet (5 mg total) by mouth daily at bedtime as needed for sleep, Starting Wed 10/5/2022, Normal           No discharge procedures on file  PDMP Review     None           ED Provider  Attending physically available and evaluated Tramaine Perez I managed the patient along with the ED Attending      Electronically Signed by         Hussein Garner MD  05/28/23 [id number]

## 2023-06-15 DIAGNOSIS — G43.909 MIGRAINE WITHOUT STATUS MIGRAINOSUS, NOT INTRACTABLE, UNSPECIFIED MIGRAINE TYPE: ICD-10-CM

## 2023-06-16 RX ORDER — SUMATRIPTAN 100 MG/1
100 TABLET, FILM COATED ORAL ONCE AS NEEDED
Qty: 9 TABLET | Refills: 1 | Status: SHIPPED | OUTPATIENT
Start: 2023-06-16

## 2023-06-25 DIAGNOSIS — M79.2 NEUROPATHIC PAIN: ICD-10-CM

## 2023-06-25 DIAGNOSIS — G35 MULTIPLE SCLEROSIS, RELAPSING-REMITTING (HCC): ICD-10-CM

## 2023-06-26 RX ORDER — BACLOFEN 10 MG/1
10 TABLET ORAL
Qty: 30 TABLET | Refills: 2 | Status: SHIPPED | OUTPATIENT
Start: 2023-06-26

## 2023-08-05 DIAGNOSIS — G35 MULTIPLE SCLEROSIS, RELAPSING-REMITTING (HCC): ICD-10-CM

## 2023-08-05 DIAGNOSIS — M79.2 NEUROPATHIC PAIN: ICD-10-CM

## 2023-08-06 RX ORDER — GABAPENTIN 300 MG/1
CAPSULE ORAL
Qty: 120 CAPSULE | Refills: 1 | Status: SHIPPED | OUTPATIENT
Start: 2023-08-06

## 2023-08-11 DIAGNOSIS — G43.909 MIGRAINE WITHOUT STATUS MIGRAINOSUS, NOT INTRACTABLE, UNSPECIFIED MIGRAINE TYPE: ICD-10-CM

## 2023-08-11 RX ORDER — SUMATRIPTAN 100 MG/1
100 TABLET, FILM COATED ORAL ONCE AS NEEDED
Qty: 9 TABLET | Refills: 1 | Status: SHIPPED | OUTPATIENT
Start: 2023-08-11

## 2023-10-09 ENCOUNTER — TELEPHONE (OUTPATIENT)
Age: 63
End: 2023-10-09

## 2023-10-09 ENCOUNTER — OFFICE VISIT (OUTPATIENT)
Dept: OBGYN CLINIC | Facility: CLINIC | Age: 63
End: 2023-10-09
Payer: COMMERCIAL

## 2023-10-09 ENCOUNTER — APPOINTMENT (OUTPATIENT)
Dept: RADIOLOGY | Age: 63
End: 2023-10-09
Payer: COMMERCIAL

## 2023-10-09 VITALS
WEIGHT: 190 LBS | HEIGHT: 61 IN | DIASTOLIC BLOOD PRESSURE: 88 MMHG | BODY MASS INDEX: 35.87 KG/M2 | SYSTOLIC BLOOD PRESSURE: 136 MMHG

## 2023-10-09 DIAGNOSIS — M17.12 PRIMARY OSTEOARTHRITIS OF LEFT KNEE: ICD-10-CM

## 2023-10-09 DIAGNOSIS — S80.02XA CONTUSION OF LEFT KNEE, INITIAL ENCOUNTER: ICD-10-CM
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
## 2023-10-09 DIAGNOSIS — M25.562 ACUTE PAIN OF LEFT KNEE: Primary | ICD-10-CM

## 2023-10-09 DIAGNOSIS — M25.562 ACUTE PAIN OF LEFT KNEE: ICD-10-CM

## 2023-10-09 PROCEDURE — 73562 X-RAY EXAM OF KNEE 3: CPT

## 2023-10-09 PROCEDURE — 99203 OFFICE O/P NEW LOW 30 MIN: CPT | Performed by: PHYSICIAN ASSISTANT

## 2023-10-09 NOTE — PATIENT INSTRUCTIONS
P. R.I.C.E. Treatment   WHAT YOU NEED TO KNOW:   What is P.R.I.C.E. treatment? P.R.I.C.E. treatment is a 5-step process used to decrease swelling and pain caused by an injury. P.R.I.C.E. stands for protect, rest, ice, compress, and elevate. Start P.R.I.C.E. within 24 to 48 hours of an injury. How do I use P.R.I.C.E. treatment? Protect  your injury from more damage. Support the injured area with a brace or splint. Your healthcare provider will tell you how long to use the brace or splint. Rest  your injured area as directed. You may need to stop using, or keep weight off, the injury for 48 hours or longer. Your healthcare provider may recommend crutches or another device. Return to your usual activities as directed. Apply ice  on your injured area for 15 to 20 minutes every 4 hours or as directed. Use an ice pack, or put crushed ice in a plastic bag. Cover the bag with a towel before you apply it to your skin. Ice helps prevent tissue damage and decreases swelling and pain. Compress  (keep pressure on) the injured area. Compression will help decrease swelling and support the injured area. Use an elastic bandage, air stirrup, splint, or sling as directed. If you use an elastic bandage, make sure the bandage is not too tight. You should be able to slip 2 fingers between the bandage and your skin. Elevate  the injured area above the level of your heart as often as you can. This will help decrease swelling and pain. Prop the injured area on pillows or blankets to keep it elevated comfortably. When should I seek immediate care? Your pain is severe. You have severe swelling or deformity. You have numbness in the injured area. When should I call my doctor? Your pain and swelling do not go away after a few days. You have questions or concerns about your condition or care. CARE AGREEMENT:   You have the right to help plan your care.  Learn about your health condition and how it may be treated. Discuss treatment options with your healthcare providers to decide what care you want to receive. You always have the right to refuse treatment. The above information is an  only. It is not intended as medical advice for individual conditions or treatments. Talk to your doctor, nurse or pharmacist before following any medical regimen to see if it is safe and effective for you. © Copyright Zahida Barajas 2023 Information is for End User's use only and may not be sold, redistributed or otherwise used for commercial purposes. Crutch Instructions   WHAT YOU NEED TO KNOW:   Crutches are tools that provide support and balance when you walk. You may need 1 or 2 crutches to help support your body weight. You may need crutches if you had surgery or an injury that affects your ability to walk. DISCHARGE INSTRUCTIONS:   Return to the emergency department if:   You have sudden numbness in a hand or arm. Your arm is swollen, red, and warm to the touch. Your fingers feel cold or have cramping pain. Call your doctor if:   Your crutches do not fit. One crutch is longer than the other. Your crutches break or get lost.    The rubber tips of your crutches are split or loose. You get blisters or painful calluses on your hands or armpits. Your armpit is red, sore, or has bumps or pimples. Your arm muscles get weaker the longer you use the crutches. You have questions or concerns about your condition or care. How to use crutches safely:   Support your weight with your arms and hands. Do not support your weight with your armpits. This could hurt the nerves and blood vessels that are in your armpits. Keep your elbow bent when the crutch is in place under your arm. Walk slowly and carefully with crutches. Go up and down stairs and ramps slowly. Stop to rest when you feel tired. Get up slowly to a sitting or standing position. This will help prevent dizziness and fainting.  Use your crutches only on firm ground. Use caution when you walk on ice or snow. Wet or waxed floors and smooth cement floors can be slippery. Watch out for small rugs or cords. Create clear pathways between rooms in your home. You may need to move your furniture to do this. Keep your needed items within reach. Carry items in a bag or backpack to keep your hands free. How to walk with crutches:   Place both crutches under your arms. Place your hands on the hand  of the crutches. Place your crutches slightly in front of you. Position the crutches. The top of the crutches should be about 2 fingers [id number] (about 1½ inches) below your armpits. Place your weight on your hands. The top of the crutches should not press into your armpits. If you have one leg that is injured,  keep it off the floor by bending your knee. Lift the crutches and move them a step ahead of you. Put the rubber ends of the crutches firmly on the ground. Move your injured leg between the crutches and slowly bring your body forward. Shift your weight onto the crutches using your arms. Take a normal step with your uninjured leg. If you are using your crutches for balance,  move your right foot and left crutch forward. Then move your left foot and right crutch forward. Keep walking this way. How to go up stairs with crutches:   Face the stairs. Put the crutches close to the first step. Push onto the crutches and put your uninjured leg on the first step. Put your weight on your uninjured leg that is on the first step. Bring both crutches and the injured leg onto the step at the same time. Make sure the rubber ends of the crutches are completely on the step. When you hold onto a railing with one arm, put both crutches under the other arm. Use the railing to help you go up the stairs. How to go down stairs with crutches:   Stand with the toes of your uninjured leg close to the edge of the step.     Bend the knee of your uninjured leg. Slowly lower both crutches along with the injured leg onto the next step. Lean on your crutches. Slowly lower your uninjured leg onto the same step. Place both crutches under one arm while you hold onto the railing with the other arm. How to sit in a chair with crutches:   Make sure the chair is sturdy and will not move. Turn and back up to the chair until you feel the edge of it against the backs of your legs. Keep your injured leg forward. Hold both crutches with one hand. Use your other hand to reach back and hold on to the chair. Slowly lower your body to the chair. How to get up from a chair with crutches:   Sit on the edge of your chair. Push up with your hands using the crutches or arms of the chair. Put your weight on your uninjured foot as you get up. Keep your injured leg bent at the knee and off the floor. © Copyright [facility] 2023 Information is for End User's use only and may not be sold, redistributed or otherwise used for commercial purposes. The above information is an  only. It is not intended as medical advice for individual conditions or treatments. Talk to your doctor, nurse or pharmacist before following any medical regimen to see if it is safe and effective for you.

## 2023-10-09 NOTE — PROGRESS NOTES
Orthopaedic Surgery - Office Note  Stanford Nyhan (27 y.o. female)   : 1960   MRN: [id number]  Encounter Date: 10/9/2023    Chief Complaint   Patient presents with   • Left Knee - Pain         Assessment/Plan  Diagnoses and all orders for this visit:    Acute pain of left knee  -     XR knee 3 vw left non injury; Future  -     MRI knee left  wo contrast; Future    Primary osteoarthritis of left knee  -     MRI knee left  wo contrast; Future    Contusion of left knee, initial encounter  -     MRI knee left  wo contrast; Future    Fall, initial encounter-from 3 ft elevation 10-6-23  -     MRI knee left  wo contrast; Future    The diagnosis as well as treatment options were reviewed with the patient in the office today. I reviewed I cannot rule out an occult tibial plateau fracture with her 2 recent traumas and severe pain in the medial aspect of the knee. She would feel more comfortable getting a stat MRI to rule out occult fracture prior to considering a cortisone injection for pain relief of osteoarthritis. Patient should ice the knee 20 minutes on 1 hour off 3 times a day. She may use an oral anti-inflammatory such as Aleve 1 tablet twice daily with food stopping and calling if any stomach upset occurs and may utilize Tylenol as needed for breakthrough pain. I would not recommend oral narcotics for this condition. She will be provided crutches today in the office and placed nonweightbearing to the left lower extremity pending results of the MRI. She will use aspirin 81 mg twice daily for DVT prophylaxis. Return for Recheck with orthopedic surgeon to discuss stat MRI. Lidia Phalen History of Present Illness  This is a new patient with acute left knee pain. She reports she is tried multiple oral NSAIDs and Tylenol without relief of pain. Initial injury occurred 3 days ago.   She reports she jumped from a 3 foot elevation landing on the left knee causing immediate pain in the medial aspect of the left knee with a audible "pop". She has had significant pain with weightbearing since that time. She reports that today prior to her visit she contused the anterior medial aspect of the left knee off of a bedpost as well. She reports significant discomfort since these 2 injuries and pain with every step. She has a history of prior knee arthroscopy approximately 10 years ago. She reports this was from as a school tears. No calf pain or paresthesias are reported. Patient requests "Vicodin, but nothing harder for pain."    Patient has a contributing medical history of multiple sclerosis, fibromyalgia, chronic pain disorder, bipolar 1, and lumbar spinal stenosis. She is not diabetic. Review of Systems  Pertinent items are noted in HPI. All other systems were reviewed and are negative. Physical Exam  /88 (BP Location: Left arm, Patient Position: Sitting, Cuff Size: Standard)   Ht 5' 1" (1.549 m)   Wt 86.2 kg (190 lb)   LMP  (LMP Unknown)   BMI 35.90 kg/m²   Cons: Appears well. No apparent distress. Psych: Alert. Oriented x3. Mood and affect normal.  Patient's left knee has no skin breakdown lesions or signs of infection. There does appear to be soft tissue edema in the region of the Pes anserine bursa. There is no prepatellar bursitis. Patient has severe pain with light and deep palpation to the medial tibial plateau and medial joint line. I do not appreciate any intra-articular effusion. She reports pain with valgus and varus stressing although I do not appreciate any gross instability. She has full extension and flexes to 125 degrees. Quad and hamstring strength are decreased to 4 out of 5. There is no ecchymosis appreciated over the anterior knee in the office today. Her gait is antalgic favoring the left side. She has no calf tenderness and a negative Homans. She has a negative straight leg raise.   There is no extensor lag with an intact quad tendon and patella tendon on physical exam.             Studies Reviewed  Narrative & Impression    LEFT KNEE     INDICATION:   Fall/ Knee pain.     COMPARISON:  June 12, 2014.     VIEWS:  XR KNEE 4+ VW LEFT INJURY         FINDINGS:     There is no acute fracture or dislocation.     There is no joint effusion.     Mild medial tibiofemoral and patellofemoral osteoarthrosis manifested by mild joint space narrowing, subchondral sclerosis and small osteophytes.     No lytic or blastic lesions are seen.     Soft tissues are unremarkable.     IMPRESSION:     No acute osseous abnormality.           Workstation performed: [id number]            Procedures  No procedures today.-Patient declines considering a cortisone injection today    Medical, Surgical, Family, and Social History  The patient's medical history, family history, and social history, were reviewed and updated as appropriate.     Past Medical History:   Diagnosis Date   • Asthma    • Asthma with COPD     last assessed-9/1/2017   • Closed fracture of fourth lumbar vertebra ([address])     unspecified fracture morphology, initial [id number]-[id number]-5/31/2017   • COPD (chronic obstructive pulmonary disease) (HCC)    • Heart murmur     last assessed-5/4/2012   • Laceration of finger     last assessed-2/26/2014   • Lipoma     last assessed-5/3/2013   • Migraine     last assessed-1/16/2013   • Multiple sclerosis (HCC)     last assessed-11/14/20171376-[id number]   • Obstructive sleep apnea     last assessed-9/23/2013   • Osteoarthritis     last assessed-5/4/2012   • Post traumatic stress disorder     last assessed-12/13/2013   • Tenosynovitis of thumb     left thumb-septic-last assessed-3/26/2014   • Weight gain     last assessed-10/17/2013-50lb in about a year with frequent steroids       Past Surgical History:   Procedure Laterality Date   • ABCESS DRAINAGE  06/27/2022   • FINGER SURGERY     • KNEE ARTHROSCOPY W/ MENISCAL REPAIR Right     description: 4/2012-KNEE ARTHROSCOPY W/ MEDIAL MENISCAL REPAIR        Family History   Problem Relation Age of Onset   • Hepatitis Mother         C   • Asthma Father    • COPD Father    • Diabetes Father    • Hypertension Father    • Asthma Brother        Social History     Occupational History   • Not on file   Tobacco Use   • Smoking status: Every Day     Packs/day: 0.25     Years: 25.00     Total pack years: 6.25     Types: Cigarettes     Start date: 12/2/2022   • Smokeless tobacco: Never   Vaping Use   • Vaping Use: Never used   Substance and Sexual Activity   • Alcohol use: Not Currently   • Drug use: No   • Sexual activity: Not Currently     Partners: Male     Birth control/protection: None       Allergies   Allergen Reactions   • No Known Allergies          Current Outpatient Medications:   •  albuterol (2.5 mg/3 mL) 0.083 % nebulizer solution, Take 1 vial (2.5 mg total) by nebulization every 4 (four) hours as needed for wheezing or shortness of breath, Disp: 25 vial, Rfl: 5  •  baclofen 10 mg tablet, TAKE 1 TABLET (10 MG TOTAL) BY MOUTH DAILY AT BEDTIME AS NEEDED FOR MUSCLE SPASMS, Disp: 30 tablet, Rfl: 2  •  brompheniramine-pseudoephedrine-DM 30-2-10 MG/5ML syrup, Take 10 mL by mouth 4 (four) times a day as needed for allergies (Patient not taking: Reported on 10/5/2022), Disp: 180 mL, Rfl: 0  •  cetirizine (ZyrTEC) 10 mg tablet, TAKE 1 TABLET BY MOUTH EVERY DAY, Disp: 30 tablet, Rfl: 2  •  Dulera 100-5 MCG/ACT inhaler, INHALE 2 PUFFS BY MOUTH 2 TIMES A DAY RINSE MOUTH AFTER USE., Disp: 13 g, Rfl: 5  •  FLUoxetine (PROzac) 10 mg capsule, TAKE 1 CAPSULE BY MOUTH EVERY DAY, Disp: 90 capsule, Rfl: 0  •  gabapentin (NEURONTIN) 300 mg capsule, TAKE 1 CAPS IN THE MORNING AND NOON AND 2 CAPS BEFORE BEDTIME, Disp: 120 capsule, Rfl: 1  •  lidocaine (Lidoderm) 5 %, Apply 1 patch topically over 12 hours daily for 5 days Remove & Discard patch within 12 hours or as directed by MD, Disp: 5 patch, Rfl: 0  •  SUMAtriptan (IMITREX) 100 mg tablet, TAKE 1 TABLET (100 MG TOTAL) BY MOUTH ONCE AS NEEDED FOR MIGRAINE FOR UP TO 1 DOSE, Disp: 9 tablet, Rfl: 1  •  Ventolin  (90 Base) MCG/ACT inhaler, TAKE 2 PUFFS BY MOUTH EVERY 4 HOURS AS NEEDED FOR WHEEZE, Disp: 18 g, Rfl: 5  •  zolpidem (AMBIEN) 5 mg tablet, Take 1 tablet (5 mg total) by mouth daily at bedtime as needed for sleep, Disp: 90 tablet, Rfl: 3      Denis Rivera PA-C

## 2023-10-09 NOTE — TELEPHONE ENCOUNTER
Caller: patient    Doctor: n/a    Reason for call: patient wanted to know if they were seen in [facility] for knee injuries    Call back#: n/a

## 2023-11-26 DIAGNOSIS — G43.909 MIGRAINE WITHOUT STATUS MIGRAINOSUS, NOT INTRACTABLE, UNSPECIFIED MIGRAINE TYPE: ICD-10-CM

## 2023-11-27 RX ORDER — SUMATRIPTAN 100 MG/1
TABLET, FILM COATED ORAL
Qty: 9 TABLET | Refills: 1 | Status: SHIPPED | OUTPATIENT
Start: 2023-11-27

## 2023-12-27 DIAGNOSIS — G35 MULTIPLE SCLEROSIS, RELAPSING-REMITTING (HCC): ICD-10-CM

## 2023-12-27 DIAGNOSIS — M79.2 NEUROPATHIC PAIN: ICD-10-CM

## 2023-12-27 RX ORDER — GABAPENTIN 300 MG/1
CAPSULE ORAL
Qty: 120 CAPSULE | Refills: 1 | Status: SHIPPED | OUTPATIENT
Start: 2023-12-27

## 2024-01-01 NOTE — ASSESSMENT & PLAN NOTE
Patient has asthma COPD  A currently on Ventolin inhaler as needed and Dulera daily  States that she is coming down with more rhinorrhea and postnasal drip  Discussed with patient this is either allergies verses viral infection  The lungs do sound clear today  Asthma is well controlled  Recommend switching to different allergy medication  Will discontinue Claritin and start Zyrtec 10 milligrams daily instead  Advised patient that if symptoms get worse, to return to office and will start singular 10 milligrams at bedtime      Follow-up as needed Vaginal Delivery

## 2024-01-12 DIAGNOSIS — J44.89 ASTHMA WITH COPD: ICD-10-CM

## 2024-01-12 RX ORDER — ALBUTEROL SULFATE 90 UG/1
AEROSOL, METERED RESPIRATORY (INHALATION)
Qty: 6.7 G | Refills: 5 | Status: SHIPPED | OUTPATIENT
Start: 2024-01-12

## 2024-01-25 DIAGNOSIS — F31.9 BIPOLAR 1 DISORDER (HCC): ICD-10-CM

## 2024-01-25 RX ORDER — FLUOXETINE 10 MG/1
CAPSULE ORAL
Qty: 90 CAPSULE | Refills: 0 | Status: SHIPPED | OUTPATIENT
Start: 2024-01-25

## 2024-01-28 NOTE — PROGRESS NOTES
Assessment/Plan:    No problem-specific Assessment & Plan notes found for this encounter  Diagnoses and all orders for this visit:    Flu-like symptoms  -     oseltamivir (TAMIFLU) 75 mg capsule; Take 1 capsule (75 mg total) by mouth every 12 (twelve) hours for 5 days        Subjective: flu like symptoms      Patient ID: Kalani Kearns is a 63 y o  female  HPI  Symptoms started last night with body aches, chills, headache, subjective fevers, cough, chest tightness  No abdominal pain, n/v, chest pain  The following portions of the patient's history were reviewed and updated as appropriate: allergies, current medications, past family history, past medical history, past social history, past surgical history and problem list     Review of Systems   Constitutional: Positive for appetite change, chills, fatigue and fever  Negative for unexpected weight change  HENT: Positive for congestion, ear pain, rhinorrhea, sinus pressure, sinus pain and sore throat  Negative for trouble swallowing  Eyes: Negative for pain and visual disturbance  Respiratory: Positive for chest tightness  Negative for cough, shortness of breath and wheezing  Cardiovascular: Negative for chest pain  Gastrointestinal: Negative for abdominal distention, abdominal pain, blood in stool, constipation, diarrhea, nausea and vomiting  Endocrine: Negative for polydipsia and polyuria  Genitourinary: Negative for dysuria and hematuria  Musculoskeletal: Positive for myalgias  Negative for back pain  Skin: Negative for rash  Neurological: Positive for headaches  Negative for syncope  Psychiatric/Behavioral: Negative for suicidal ideas           PHQ-2/9 Depression Screening         [unfilled]    Objective:      /70 (BP Location: Left arm, Patient Position: Sitting, Cuff Size: Large)   Pulse (!) 123   Temp 99 1 °F (37 3 °C) (Tympanic)   Resp 16   Ht 5' 1" (1 549 m)   Wt 85 5 kg (188 lb 6 4 oz)   LMP  (LMP Received patient and assumed care. Patient is alert , oriented x4 [place].left 5th toe is discolored with foul smell. foot is read and swollen (+) neuropathy both legs .   Connected to cardiac monitor , v/s taken and recorded. Oral care and nasal swab done . Purple gown applied.   Farrell cath noted draining with yellowish urine approx 300ml.   FSBs of 234 , Anesthesiologist Jacques notified and order to give insulin coverage. Waiting for Dr. Murillo.      Unknown)   SpO2 97%   BMI 35 60 kg/m²          Physical Exam  Constitutional:       Appearance: She is well-developed  HENT:      Head: Normocephalic and atraumatic  Right Ear: External ear normal       Left Ear: External ear normal       Mouth/Throat:      Pharynx: No oropharyngeal exudate  Eyes:      General: No scleral icterus  Conjunctiva/sclera: Conjunctivae normal       Pupils: Pupils are equal, round, and reactive to light  Cardiovascular:      Rate and Rhythm: Normal rate and regular rhythm  Heart sounds: No murmur heard  No friction rub  No gallop  Pulmonary:      Effort: Pulmonary effort is normal  No respiratory distress  Breath sounds: Normal breath sounds  No wheezing or rales  Abdominal:      General: Bowel sounds are normal  There is no distension  Palpations: Abdomen is soft  There is no mass  Tenderness: There is no abdominal tenderness  There is no rebound  Musculoskeletal:         General: Normal range of motion  Cervical back: Normal range of motion and neck supple  Skin:     General: Skin is warm and dry  Neurological:      Mental Status: She is alert and oriented to person, place, and time

## 2024-03-22 ENCOUNTER — TELEPHONE (OUTPATIENT)
Dept: NEUROLOGY | Facility: CLINIC | Age: 64
End: 2024-03-22

## 2024-03-22 NOTE — TELEPHONE ENCOUNTER
Recberenice  3/19 3:44 PM   Minerva blancas. My YOB: 1960. Do I  have an appointment with you on the 20th. Please return my phone call and let me know.    [phone]  ________  Reached , mailbox full

## 2024-04-22 DIAGNOSIS — G35 MULTIPLE SCLEROSIS, RELAPSING-REMITTING (HCC): ICD-10-CM

## 2024-04-22 DIAGNOSIS — M79.2 NEUROPATHIC PAIN: ICD-10-CM

## 2024-04-22 RX ORDER — GABAPENTIN 300 MG/1
CAPSULE ORAL
Qty: 120 CAPSULE | Refills: 1 | OUTPATIENT
Start: 2024-04-22

## 2024-04-23 NOTE — TELEPHONE ENCOUNTER
Pt hasn't been seen in our office since 2022.  We have given her courtesy refills and she has canceled/no showed at least 2 appointments since then.  She needs to be seen prior to any further refills at this time.

## 2024-06-18 ENCOUNTER — TELEPHONE (OUTPATIENT)
Dept: NEUROLOGY | Facility: CLINIC | Age: 64
End: 2024-06-18

## 2024-06-18 NOTE — TELEPHONE ENCOUNTER
Patient was scheduled with Dr KRAFT on 6/20. This date is virtual appointments as labeled. Please reschedule patient.

## 2024-06-18 NOTE — TELEPHONE ENCOUNTER
Called patient to r/s appointment, unable to leave vm due to full mailbox.     Please reschedule patient to another date as 6/20 for Dr KRAFT is for Virtual Visits only.

## 2024-06-18 NOTE — TELEPHONE ENCOUNTER
Called patient in attempt to reschedule or offer virtual for current appt on 6/20. No answer. LMOM and call back phone number. [employer] message already sent.

## 2024-06-19 NOTE — TELEPHONE ENCOUNTER
received vm from 6/18/24 at 4:56pm-   Hello, this is Minerva blancas. My date of birth is [birth year]. I have an appointment on the 20 on the 20th at I think it's 9:30 in the morning. I need to reschedule that appointment for later in the afternoon, during the day,  I can't make it there, I take a bus. please to my phone call as soon as possible. Thank you.

## 2024-07-03 ENCOUNTER — VBI (OUTPATIENT)
Dept: ADMINISTRATIVE | Facility: OTHER | Age: 64
End: 2024-07-03

## 2024-07-03 NOTE — TELEPHONE ENCOUNTER
07/03/24 8:24 AM     Chart reviewed for Pap Smear (HPV) aka Cervical Cancer Screening ; nothing is submitted to the patient's insurance at this time.     Patience Ervin   [facility]

## 2024-08-11 DIAGNOSIS — F31.9 BIPOLAR 1 DISORDER (HCC): ICD-10-CM

## 2024-08-12 RX ORDER — FLUOXETINE 10 MG/1
CAPSULE ORAL
Qty: 90 CAPSULE | Refills: 0 | Status: SHIPPED | OUTPATIENT
Start: 2024-08-12

## 2024-08-29 ENCOUNTER — HOSPITAL ENCOUNTER (EMERGENCY)
Facility: HOSPITAL | Age: 64
Discharge: HOME | End: 2024-08-30
Attending: EMERGENCY MEDICINE
Payer: COMMERCIAL

## 2024-08-29 VITALS
DIASTOLIC BLOOD PRESSURE: 95 MMHG | HEART RATE: 100 BPM | SYSTOLIC BLOOD PRESSURE: 179 MMHG | RESPIRATION RATE: 18 BRPM | TEMPERATURE: 98.1 F | OXYGEN SATURATION: 97 %

## 2024-08-29 DIAGNOSIS — N61.1 BREAST ABSCESS: Primary | ICD-10-CM

## 2024-08-29 PROCEDURE — 99282 EMERGENCY DEPT VISIT SF MDM: CPT

## 2024-08-29 RX ORDER — LIDOCAINE HYDROCHLORIDE 10 MG/ML
5 INJECTION, SOLUTION EPIDURAL; INFILTRATION; INTRACAUDAL; PERINEURAL ONCE
Status: COMPLETED | OUTPATIENT
Start: 2024-08-30 | End: 2024-08-29

## 2024-08-29 RX ORDER — IBUPROFEN 400 MG/1
400 TABLET, FILM COATED ORAL ONCE
Status: COMPLETED | OUTPATIENT
Start: 2024-08-30 | End: 2024-08-29

## 2024-08-29 RX ADMIN — LIDOCAINE HYDROCHLORIDE 5 ML: 10 INJECTION, SOLUTION EPIDURAL; INFILTRATION; INTRACAUDAL; PERINEURAL at 23:52

## 2024-08-29 RX ADMIN — IBUPROFEN 400 MG: 400 TABLET, FILM COATED ORAL at 23:52

## 2024-08-30 PROCEDURE — 76882 US LMTD JT/FCL EVL NVASC XTR: CPT | Performed by: EMERGENCY MEDICINE

## 2024-08-30 PROCEDURE — 99284 EMERGENCY DEPT VISIT MOD MDM: CPT | Performed by: EMERGENCY MEDICINE

## 2024-08-30 PROCEDURE — 10060 I&D ABSCESS SIMPLE/SINGLE: CPT | Performed by: EMERGENCY MEDICINE

## 2024-08-30 RX ORDER — DOXYCYCLINE 100 MG/1
100 CAPSULE ORAL 2 TIMES DAILY
Qty: 14 CAPSULE | Refills: 0 | Status: SHIPPED | OUTPATIENT
Start: 2024-08-30 | End: 2024-08-31 | Stop reason: ALTCHOICE

## 2024-08-30 RX ORDER — DOXYCYCLINE 100 MG/1
100 CAPSULE ORAL ONCE
Status: COMPLETED | OUTPATIENT
Start: 2024-08-30 | End: 2024-08-30

## 2024-08-30 RX ADMIN — DOXYCYCLINE 100 MG: 100 CAPSULE ORAL at 00:16

## 2024-08-30 NOTE — ED ATTENDING ATTESTATION
8/29/2024  IaWlly MD, saw and evaluated the patient. I have discussed the patient with the resident/non-physician practitioner and agree with the resident's/non-physician practitioner's findings, Plan of Care, and MDM as documented in the resident's/non-physician practitioner's note, except where noted. All available labs and Radiology studies were reviewed.  I was present for key portions of any procedure(s) performed by the resident/non-physician practitioner and I was immediately available to provide assistance.       At this point I agree with the current assessment done in the Emergency Department.  I have conducted an independent evaluation of this patient a history and physical is as follows:    ED Course  ED Course as of 08/29/24 2357   Thu Aug 29, 2024   2336 Per resident h&p 64 YO F presents for L breast mass; progressively more painful; no trauma to the area; h/o benign R breast mass; no rashes; O: benign I/P 64 YO F with L breast mass     Emergency Department Note- Minerva Amos 63 y.o. female MRN: [id number]    Unit/Bed#: ED 29 Encounter: [id number]    Minerva Amos is a 63 y.o. female who presents with   Chief Complaint   Patient presents with    Breast Pain     Pt reports noticing a lump on her left breast while in the shower and then noticed it was getting bigger and red and sore to touch.         History of Present Illness   HPI:  Minerva Amos is a 63 y.o. female who presents for evaluation of:  Left breast lump that has developed over the last several hours and has become progressively more uncomfortable.  She denies any associated fevers and chills.  She notes that in the past she did have a cyst in her right breast that was not malignancy.  She denies any trauma to the area of her left breast.  There is been no drainage from the lesion.    Review of Systems   Constitutional:  Negative for fatigue and fever.   HENT:  Negative for congestion and sore throat.    Respiratory:   Negative for cough and shortness of breath.    Cardiovascular:  Negative for chest pain and palpitations.   Gastrointestinal:  Negative for abdominal pain and nausea.   Genitourinary:  Negative for flank pain and frequency.   Neurological:  Negative for light-headedness and headaches.   Psychiatric/Behavioral:  Negative for dysphoric mood and hallucinations.    All other systems reviewed and are negative.      Historical Information   Past Medical History:   Diagnosis Date    Asthma     Asthma with COPD     last assessed-9/1/2017    Closed fracture of fourth lumbar vertebra (HCC)     unspecified fracture morphology, initial wwyskbnua-bicktfnh-3/31/2017    COPD (chronic obstructive pulmonary disease) ([facility])     Heart murmur     last assessed-5/4/2012    Laceration of finger     last assessed-2/26/2014    Lipoma     last assessed-5/3/2013    Migraine     last assessed-1/16/2013    Multiple sclerosis ([facility])     last assessed-11/14/20176012-[id number]-[id number]    Obstructive sleep apnea     last assessed-9/23/2013    Osteoarthritis     last assessed-5/4/2012    Post traumatic stress disorder     last assessed-12/13/2013    Tenosynovitis of thumb     left thumb-septic-last assessed-3/26/2014    Weight gain     last assessed-10/17/2013-50lb in about a year with frequent steroids     Past Surgical History:   Procedure Laterality Date    ABCESS DRAINAGE  06/27/2022    FINGER SURGERY      KNEE ARTHROSCOPY W/ MENISCAL REPAIR Right     description: 4/2012-KNEE ARTHROSCOPY W/ MEDIAL MENISCAL REPAIR      Social History   Social History     Substance and Sexual Activity   Alcohol Use Not Currently     Social History     Substance and Sexual Activity   Drug Use No     Social History     Tobacco Use   Smoking Status Every Day    Current packs/day: 0.25    Average packs/day: 0.3 packs/day for 25.0 years (6.2 ttl pk-yrs)    Types: Cigarettes    Start date: 12/2/2022   Smokeless Tobacco Never     Family History:   Family History   Problem  Relation Age of Onset    Hepatitis Mother         C    Asthma Father     COPD Father     Diabetes Father     Hypertension Father     Asthma Brother        Meds/Allergies   PTA meds:   Prior to Admission Medications   Prescriptions Last Dose Informant Patient Reported? Taking?   Dulera 100-5 MCG/ACT inhaler   No No   Sig: INHALE 2 PUFFS BY MOUTH 2 TIMES A DAY RINSE MOUTH AFTER USE.   FLUoxetine (PROzac) 10 mg capsule   No No   Sig: TAKE 1 CAPSULE BY MOUTH EVERY DAY   SUMAtriptan (IMITREX) 100 mg tablet   No No   Sig: TAKE 1 TABLET (100 MG TOTAL) BY MOUTH ONCE AS NEEDED FOR MIGRAINE FOR UP TO 1 DOSE**DUE 8/17   Ventolin  (90 Base) MCG/ACT inhaler   No No   Sig: INHALE 2 PUFFS BY MOUTH EVERY 4 HOURS AS NEEDED FOR WHEEZE   albuterol (2.5 mg/3 mL) 0.083 % nebulizer solution  Self No No   Sig: Take 1 vial (2.5 mg total) by nebulization every 4 (four) hours as needed for wheezing or shortness of breath   baclofen 10 mg tablet   No No   Sig: TAKE 1 TABLET (10 MG TOTAL) BY MOUTH DAILY AT BEDTIME AS NEEDED FOR MUSCLE SPASMS   brompheniramine-pseudoephedrine-DM 30-2-10 MG/5ML syrup  Self No No   Sig: Take 10 mL by mouth 4 (four) times a day as needed for allergies   Patient not taking: Reported on 10/5/2022   cetirizine (ZyrTEC) 10 mg tablet   No No   Sig: TAKE 1 TABLET BY MOUTH EVERY DAY   gabapentin (NEURONTIN) 300 mg capsule   No No   Sig: TAKE 1 CAPSULE IN THE MORNING AND NOON AND 2 CAPSULES BEFORE BEDTIME   lidocaine (Lidoderm) 5 %   No No   Sig: Apply 1 patch topically over 12 hours daily for 5 days Remove & Discard patch within 12 hours or as directed by MD   zolpidem (AMBIEN) 5 mg tablet  Self No No   Sig: Take 1 tablet (5 mg total) by mouth daily at bedtime as needed for sleep      Facility-Administered Medications: None     Allergies   Allergen Reactions    No Known Allergies        Objective   First Vitals:   Blood Pressure: (!) 179/95 (08/29/24 2304)  Pulse: 100 (08/29/24 2304)  Temperature: 98.1 °F (36.7  °C) (24)  Temp Source: Tympanic (24)  Respirations: 18 (24)  SpO2: 97 % (24)    Current Vitals:   Blood Pressure: (!) 179/95 (24)  Pulse: 100 (24)  Temperature: 98.1 °F (36.7 °C) (24)  Temp Source: Tympanic (24)  Respirations: 18 (24)  SpO2: 97 % (24)    No intake or output data in the 24 hours ending 24    Invasive Devices       None                   Physical Exam  Vitals and nursing note reviewed.   Constitutional:       General: She is not in acute distress.     Appearance: Normal appearance. She is well-developed.   HENT:      Head: Normocephalic and atraumatic.      Right Ear: External ear normal.      Left Ear: External ear normal.      Nose: Nose normal.      Mouth/Throat:      Pharynx: No oropharyngeal exudate.   Eyes:      Conjunctiva/sclera: Conjunctivae normal.      Pupils: Pupils are equal, round, and reactive to light.   Cardiovascular:      Rate and Rhythm: Normal rate and regular rhythm.   Pulmonary:      Effort: Pulmonary effort is normal. No respiratory distress.   Abdominal:      General: Abdomen is flat. There is no distension.      Palpations: Abdomen is soft.   Musculoskeletal:         General: No deformity. Normal range of motion.      Cervical back: Normal range of motion and neck supple.   Skin:     General: Skin is warm and dry.      Capillary Refill: Capillary refill takes less than 2 seconds.   Neurological:      General: No focal deficit present.      Mental Status: She is alert and oriented to person, place, and time. Mental status is at baseline.      Coordination: Coordination normal.   Psychiatric:         Mood and Affect: Mood normal.         Behavior: Behavior normal.         Thought Content: Thought content normal.         Judgment: Judgment normal.         Left breast superficial abscess just below the left breast nipple.  Medical Decision Makin.  Left  "breast abscess: Plan skin cleansing, local anesthesia, and incision and drainage of breast abscess.  Plan to initiate oral antibiotics to treat MRSA.  Patient will follow-up in breast clinic as an outpatient.    No results found for this or any previous visit (from the past 36 hour(s)).  No orders to display         Portions of the record may have been created with voice recognition software. Occasional wrong word or \"sound a like\" substitutions may have occurred due to the inherent limitations of voice recognition software.  Read the chart carefully and recognize, using context, where substitutions have occurred.        Critical Care Time  Procedures      "

## 2024-08-30 NOTE — DISCHARGE INSTRUCTIONS
Can do warm water soaks 2x daily. Keep area clean and dry in between soaks. Tylenol and motrin can be taken as directed on bottles for pain.

## 2024-08-30 NOTE — ED PROVIDER NOTES
History  Chief Complaint   Patient presents with    Breast Pain     Pt reports noticing a lump on her left breast while in the shower and then noticed it was getting bigger and red and sore to touch.     Patient is a 62 yo female who presents for evaluation of painful left breast lump. Patient states that while showering today, she noticed a tender lump underneath nipple of left breast. She states that she did not notice the lump prior to today. She believes it has grown in size throughout the day and noticed the area to become increasingly red and warm. She denies any trauma to the area. She denies any rashes, nipple discharge, skin dimpling, fever, chills, or axillary swelling. She did have a non-tender right breast mass several years ago, which was found to be a benign cyst.          Prior to Admission Medications   Prescriptions Last Dose Informant Patient Reported? Taking?   Dulera 100-5 MCG/ACT inhaler   No No   Sig: INHALE 2 PUFFS BY MOUTH 2 TIMES A DAY RINSE MOUTH AFTER USE.   FLUoxetine (PROzac) 10 mg capsule   No No   Sig: TAKE 1 CAPSULE BY MOUTH EVERY DAY   SUMAtriptan (IMITREX) 100 mg tablet   No No   Sig: TAKE 1 TABLET (100 MG TOTAL) BY MOUTH ONCE AS NEEDED FOR MIGRAINE FOR UP TO 1 DOSE**DUE 8/17   Ventolin  (90 Base) MCG/ACT inhaler   No No   Sig: INHALE 2 PUFFS BY MOUTH EVERY 4 HOURS AS NEEDED FOR WHEEZE   albuterol (2.5 mg/3 mL) 0.083 % nebulizer solution  Self No No   Sig: Take 1 vial (2.5 mg total) by nebulization every 4 (four) hours as needed for wheezing or shortness of breath   baclofen 10 mg tablet   No No   Sig: TAKE 1 TABLET (10 MG TOTAL) BY MOUTH DAILY AT BEDTIME AS NEEDED FOR MUSCLE SPASMS   brompheniramine-pseudoephedrine-DM 30-2-10 MG/5ML syrup  Self No No   Sig: Take 10 mL by mouth 4 (four) times a day as needed for allergies   Patient not taking: Reported on 10/5/2022   cetirizine (ZyrTEC) 10 mg tablet   No No   Sig: TAKE 1 TABLET BY MOUTH EVERY DAY   gabapentin (NEURONTIN) 300  mg capsule   No No   Sig: TAKE 1 CAPSULE IN THE MORNING AND NOON AND 2 CAPSULES BEFORE BEDTIME   lidocaine (Lidoderm) 5 %   No No   Sig: Apply 1 patch topically over 12 hours daily for 5 days Remove & Discard patch within 12 hours or as directed by MD   zolpidem (AMBIEN) 5 mg tablet  Self No No   Sig: Take 1 tablet (5 mg total) by mouth daily at bedtime as needed for sleep      Facility-Administered Medications: None       Past Medical History:   Diagnosis Date    Asthma     Asthma with COPD     last assessed-9/1/2017    Closed fracture of fourth lumbar vertebra ([facility])     unspecified fracture morphology, initial [id number]-rlaxixln-9/31/2017    COPD (chronic obstructive pulmonary disease) ([facility])     Heart murmur     last assessed-5/4/2012    Laceration of finger     last assessed-2/26/2014    Lipoma     last assessed-5/3/2013    Migraine     last assessed-1/16/2013    Multiple sclerosis ([facility])     last assessed-11/14/20178158-[id number]-imytbkecf    Obstructive sleep apnea     last assessed-9/23/2013    Osteoarthritis     last assessed-5/4/2012    Post traumatic stress disorder     last assessed-12/13/2013    Tenosynovitis of thumb     left thumb-septic-last assessed-3/26/2014    Weight gain     last assessed-10/17/2013-50lb in about a year with frequent steroids       Past Surgical History:   Procedure Laterality Date    ABCESS DRAINAGE  06/27/2022    FINGER SURGERY      KNEE ARTHROSCOPY W/ MENISCAL REPAIR Right     description: 4/2012-KNEE ARTHROSCOPY W/ MEDIAL MENISCAL REPAIR        Family History   Problem Relation Age of Onset    Hepatitis Mother         C    Asthma Father     COPD Father     Diabetes Father     Hypertension Father     Asthma Brother      I have reviewed and agree with the history as documented.    E-Cigarette/Vaping    E-Cigarette Use Never User      E-Cigarette/Vaping Substances    THC No     CBD No      Social History     Tobacco Use    Smoking status: Every Day     Current packs/day: 0.25      Average packs/day: 0.3 packs/day for 25.0 years (6.3 ttl pk-yrs)     Types: Cigarettes     Start date: 12/2/2022    Smokeless tobacco: Never   Vaping Use    Vaping status: Never Used   Substance Use Topics    Alcohol use: Not Currently    Drug use: No        Review of Systems   All other systems reviewed and are negative.      Physical Exam  ED Triage Vitals [08/29/24 2304]   Temperature Pulse Respirations Blood Pressure SpO2   98.1 °F (36.7 °C) 100 18 (!) 179/95 97 %      Temp Source Heart Rate Source Patient Position - Orthostatic VS BP Location FiO2 (%)   Tympanic Monitor Sitting Left arm --      Pain Score       9             Orthostatic Vital Signs  Vitals:    08/29/24 2304   BP: (!) 179/95   Pulse: 100   Patient Position - Orthostatic VS: Sitting       Physical Exam  Constitutional:       General: She is not in acute distress.     Appearance: Normal appearance. She is obese. She is not ill-appearing, toxic-appearing or diaphoretic.   HENT:      Head: Normocephalic and atraumatic.   Pulmonary:      Effort: Pulmonary effort is normal.   Chest:   Breasts:     Breasts are symmetrical.      Right: No swelling, bleeding, inverted nipple, mass, nipple discharge, skin change or tenderness.      Left: Tenderness present. No swelling, bleeding, inverted nipple, nipple discharge or skin change.      Comments: 1 cm fluctuant area under left nipple, tender, warm, erythematous. No visible pore or discharge.   Musculoskeletal:      Cervical back: Normal range of motion and neck supple.   Lymphadenopathy:      Upper Body:      Right upper body: No supraclavicular, axillary or pectoral adenopathy.      Left upper body: No supraclavicular, axillary or pectoral adenopathy.   Skin:     Findings: Erythema (1 cm radius of erythema surrouding left breast lump) present. No rash.   Neurological:      General: No focal deficit present.      Mental Status: She is alert.   Psychiatric:         Mood and Affect: Mood normal.          Behavior: Behavior normal.         ED Medications  Medications   ibuprofen (MOTRIN) tablet 400 mg (400 mg Oral Given 8/29/24 2352)   lidocaine (PF) (XYLOCAINE-MPF) 1 % injection 5 mL (5 mL Infiltration Given by Other 8/29/24 2352)   doxycycline hyclate (VIBRAMYCIN) capsule 100 mg (100 mg Oral Given 8/30/24 0016)       Diagnostic Studies  Results Reviewed       None                   No orders to display         Procedures  POC MSK/Soft Tissue US    Date/Time: 8/30/2024 3:01 AM    Performed by: Nadia Heath MD  Authorized by: Nadia Heath MD    Patient location:  ED  Performed by:  Resident  Other Assisting Provider: No    Procedure:     Performed: soft tissue ultrasound    Procedure details:     Exam Type:  Diagnostic    Longitudinal view:  Obtained    Image quality: diagnostic      Image availability:  Images available in PACS  Soft tissue ultrasound:     Soft tissue indications: swelling and suspected abscess      Anatomic location:  Breast    Soft tissue findings: subcutaneous collection (comment)      Collection size (cm):  1 (directly under skin surface)  Interpretation:     Soft tissue impressions: consistent with abscess    Incision and drain    Date/Time: 8/30/2024 3:02 AM    Performed by: Nadia Heath MD  Authorized by: Nadia Heath MD  Universal Protocol:  procedure performed by consultantConsent: Verbal consent obtained.  Consent given by: patient  Patient identity confirmed: arm band    Patient location:  ED  Location:     Type:  Abscess    Size:  1 cm    Location:  Trunk    Trunk location:  L breast  Pre-procedure details:     Skin preparation:  Chloraprep  Anesthesia (see MAR for exact dosages):     Anesthesia method:  Local infiltration    Local anesthetic:  Lidocaine 1% WITH epi  Procedure details:     Complexity:  Simple    Needle aspiration: no      Incision types:  Stab incision    Scalpel blade:  11    Incision depth:  Subcutaneous    Wound management:  Irrigated with  saline    Drainage:  Purulent    Drainage amount:  Scant    Wound treatment:  Wound left open    Packing materials:  None  Post-procedure details:     Patient tolerance of procedure:  Tolerated well, no immediate complications        ED Course                             SBIRT 20yo+      Flowsheet Row Most Recent Value   Initial Alcohol Screen: US AUDIT-C     1. How often do you have a drink containing alcohol? 0 Filed at: 08/29/2024 2305   2. How many drinks containing alcohol do you have on a typical day you are drinking?  0 Filed at: 08/29/2024 2305   3b. FEMALE Any Age, or MALE 65+: How often do you have 4 or more drinks on one occassion? 0 Filed at: 08/29/2024 2305   Audit-C Score 0 Filed at: 08/29/2024 2305   VANI: How many times in the past year have you...    Used an illegal drug or used a prescription medication for non-medical reasons? Never Filed at: 08/29/2024 2305                  Medical Decision Making  Minerav Amos is a 63 y.o. who presents with complaints of tender left breast lump     Vital signs are hypertensive, otherwise stable, afebrile  PE: see media for image of affected area     Ddx: abscess most likely based on US vs. cyst  Doubt malignancy     Plan:  US consistent with abscess  I&D performed, see procedure note  Motrin given for pain  Will prescribe 7 day course of doxycycline  Referral provided for follow up with breast surgery, recommend patient follow up promptly for further complete breast evaluation, imaging as needed   Supportive care instructions provided for abscess post I&D    Disposition: Patient stable for discharge. Return precautions provided. Patient understands and is agreeable to plan.          Risk  Prescription drug management.          Disposition  Final diagnoses:   Breast abscess     Time reflects when diagnosis was documented in both MDM as applicable and the Disposition within this note       Time User Action Codes Description Comment    8/30/2024 12:57 AM  Nadia Heath Add [N61.1] Breast abscess           ED Disposition       ED Disposition   Discharge    Condition   Stable    Date/Time   Fri Aug 30, 2024 0057    Comment   Minerva Amos discharge to home/self care.                   Follow-up Information       Follow up With Specialties Details Why Contact Info Additional Information    [facility] Emergency Department Emergency Medicine Go to  If symptoms worsen [address] [facility] [address] [phone] [facility] Emergency Department, [address] [place], [place], [address]   [phone]    Marli Castillo MD General Surgery Schedule an appointment as soon as possible for a visit today for reevaluation of left breast abscess [address] [place] [address]  [phone]               Discharge Medication List as of 8/30/2024  1:00 AM        START taking these medications    Details   doxycycline hyclate (VIBRAMYCIN) 100 mg capsule Take 1 capsule (100 mg total) by mouth 2 (two) times a day for 7 days, Starting Fri 8/30/2024, Until Fri 9/6/2024, Normal           CONTINUE these medications which have NOT CHANGED    Details   albuterol (2.5 mg/3 mL) 0.083 % nebulizer solution Take 1 vial (2.5 mg total) by nebulization every 4 (four) hours as needed for wheezing or shortness of breath, Starting Fri 10/2/2020, Normal      baclofen 10 mg tablet TAKE 1 TABLET (10 MG TOTAL) BY MOUTH DAILY AT BEDTIME AS NEEDED FOR MUSCLE SPASMS, Starting Mon 6/26/2023, Normal      brompheniramine-pseudoephedrine-DM 30-2-10 MG/5ML syrup Take 10 mL by mouth 4 (four) times a day as needed for allergies, Starting Mon 4/11/2022, Normal      cetirizine (ZyrTEC) 10 mg tablet TAKE 1 TABLET BY MOUTH EVERY DAY, Normal      Dulera 100-5 MCG/ACT inhaler INHALE 2 PUFFS BY MOUTH 2 TIMES A DAY RINSE MOUTH AFTER USE., Normal      FLUoxetine (PROzac) 10 mg capsule TAKE 1 CAPSULE BY MOUTH EVERY DAY, Normal       gabapentin (NEURONTIN) 300 mg capsule TAKE 1 CAPSULE IN THE MORNING AND NOON AND 2 CAPSULES BEFORE BEDTIME, Normal      lidocaine (Lidoderm) 5 % Apply 1 patch topically over 12 hours daily for 5 days Remove & Discard patch within 12 hours or as directed by MD, Starting Mon 2/20/2023, Until Sat 2/25/2023, Normal      SUMAtriptan (IMITREX) 100 mg tablet TAKE 1 TABLET (100 MG TOTAL) BY MOUTH ONCE AS NEEDED FOR MIGRAINE FOR UP TO 1 DOSE**DUE 8/17, Normal      Ventolin  (90 Base) MCG/ACT inhaler INHALE 2 PUFFS BY MOUTH EVERY 4 HOURS AS NEEDED FOR WHEEZE, Normal      zolpidem (AMBIEN) 5 mg tablet Take 1 tablet (5 mg total) by mouth daily at bedtime as needed for sleep, Starting Wed 10/5/2022, Normal               PDMP Review       None             ED Provider  Attending physically available and evaluated Minerva Amos. I managed the patient along with the ED Attending.    Electronically Signed by           Nadia Heath MD  08/30/24 6059

## 2024-08-31 ENCOUNTER — HOSPITAL ENCOUNTER (EMERGENCY)
Facility: HOSPITAL | Age: 64
Discharge: HOME | End: 2024-09-01
Attending: EMERGENCY MEDICINE
Payer: COMMERCIAL

## 2024-08-31 VITALS
TEMPERATURE: 98 F | HEART RATE: 93 BPM | WEIGHT: 170 LBS | OXYGEN SATURATION: 96 % | DIASTOLIC BLOOD PRESSURE: 86 MMHG | SYSTOLIC BLOOD PRESSURE: 136 MMHG | RESPIRATION RATE: 18 BRPM | BODY MASS INDEX: 32.1 KG/M2 | HEIGHT: 61 IN

## 2024-08-31 DIAGNOSIS — N61.1 LEFT BREAST ABSCESS: Primary | ICD-10-CM

## 2024-08-31 PROCEDURE — 10061 I&D ABSCESS COMP/MULTIPLE: CPT | Performed by: EMERGENCY MEDICINE

## 2024-08-31 PROCEDURE — 99284 EMERGENCY DEPT VISIT MOD MDM: CPT | Performed by: EMERGENCY MEDICINE

## 2024-08-31 PROCEDURE — 99283 EMERGENCY DEPT VISIT LOW MDM: CPT

## 2024-08-31 RX ORDER — ACETAMINOPHEN 325 MG/1
650 TABLET ORAL ONCE
Status: COMPLETED | OUTPATIENT
Start: 2024-08-31 | End: 2024-08-31

## 2024-08-31 RX ORDER — IBUPROFEN 600 MG/1
600 TABLET, FILM COATED ORAL ONCE
Status: COMPLETED | OUTPATIENT
Start: 2024-08-31 | End: 2024-08-31

## 2024-08-31 RX ORDER — SULFAMETHOXAZOLE/TRIMETHOPRIM 800-160 MG
2 TABLET ORAL 2 TIMES DAILY
Qty: 28 TABLET | Refills: 0 | Status: SHIPPED | OUTPATIENT
Start: 2024-08-31 | End: 2024-09-07

## 2024-08-31 RX ORDER — LIDOCAINE HYDROCHLORIDE AND EPINEPHRINE 10; 10 MG/ML; UG/ML
1 INJECTION, SOLUTION INFILTRATION; PERINEURAL ONCE
Status: COMPLETED | OUTPATIENT
Start: 2024-08-31 | End: 2024-08-31

## 2024-08-31 RX ORDER — ONDANSETRON 4 MG/1
4 TABLET, ORALLY DISINTEGRATING ORAL EVERY 6 HOURS PRN
Qty: 10 TABLET | Refills: 0 | Status: SHIPPED | OUTPATIENT
Start: 2024-08-31

## 2024-08-31 RX ORDER — SULFAMETHOXAZOLE/TRIMETHOPRIM 800-160 MG
2 TABLET ORAL ONCE
Status: COMPLETED | OUTPATIENT
Start: 2024-09-01 | End: 2024-09-01

## 2024-08-31 RX ADMIN — ACETAMINOPHEN 650 MG: 325 TABLET ORAL at 23:02

## 2024-08-31 RX ADMIN — LIDOCAINE HYDROCHLORIDE,EPINEPHRINE BITARTRATE 1 ML: 10; .01 INJECTION, SOLUTION INFILTRATION; PERINEURAL at 23:02

## 2024-08-31 RX ADMIN — IBUPROFEN 600 MG: 600 TABLET, FILM COATED ORAL at 23:02

## 2024-08-31 NOTE — Clinical Note
Minerva Amos was seen and treated in our emergency department on 8/31/2024.                Diagnosis: Medical problem requiring ER evaluation and treatment    Minerva  is off the rest of the shift today.    She may return on this date: 09/03/2024         If you have any questions or concerns, please don't hesitate to call.      Christelle Downing, DO    ______________________________           _______________          _______________  Hospital Representative                              Date                                Time

## 2024-09-01 ENCOUNTER — HOSPITAL ENCOUNTER (EMERGENCY)
Facility: HOSPITAL | Age: 64
Discharge: HOME | End: 2024-09-02
Attending: EMERGENCY MEDICINE
Payer: COMMERCIAL

## 2024-09-01 VITALS
OXYGEN SATURATION: 96 % | DIASTOLIC BLOOD PRESSURE: 95 MMHG | HEART RATE: 90 BPM | SYSTOLIC BLOOD PRESSURE: 146 MMHG | RESPIRATION RATE: 17 BRPM | TEMPERATURE: 97.1 F

## 2024-09-01 DIAGNOSIS — Z51.89 WOUND CHECK, ABSCESS: Primary | ICD-10-CM

## 2024-09-01 PROCEDURE — 99283 EMERGENCY DEPT VISIT LOW MDM: CPT

## 2024-09-01 PROCEDURE — 99024 POSTOP FOLLOW-UP VISIT: CPT | Performed by: EMERGENCY MEDICINE

## 2024-09-01 PROCEDURE — 96372 THER/PROPH/DIAG INJ SC/IM: CPT

## 2024-09-01 RX ORDER — KETOROLAC TROMETHAMINE 30 MG/ML
15 INJECTION, SOLUTION INTRAMUSCULAR; INTRAVENOUS ONCE
Status: COMPLETED | OUTPATIENT
Start: 2024-09-01 | End: 2024-09-01

## 2024-09-01 RX ADMIN — SULFAMETHOXAZOLE AND TRIMETHOPRIM 2 TABLET: 800; 160 TABLET ORAL at 00:06

## 2024-09-01 RX ADMIN — KETOROLAC TROMETHAMINE 15 MG: 30 INJECTION, SOLUTION INTRAMUSCULAR at 23:42

## 2024-09-01 NOTE — ED ATTENDING ATTESTATION
8/31/2024  I, Greg Rios MD, saw and evaluated the patient. I have discussed the patient with the resident/non-physician practitioner and agree with the resident's/non-physician practitioner's findings, Plan of Care, and MDM as documented in the resident's/non-physician practitioner's note, except where noted. All available labs and Radiology studies were reviewed.  I was present for key portions of any procedure(s) performed by the resident/non-physician practitioner and I was immediately available to provide assistance.       At this point I agree with the current assessment done in the Emergency Department.  I have conducted an independent evaluation of this patient a history and physical is as follows:      Final Diagnosis:  1. Left breast abscess      Chief Complaint   Patient presents with    Abscess     Patient reports she was seen and treated yesterday for an abscess on her left breast that today has doubled in size and is red and warm to the touch. Patient denies fevers, states she did have 2x episodes of vomiting today.         63-year-old female who presents for reevaluation of breast abscess.  Seen on 8/29/2024.  Had needle aspiration and placed on doxycycline.  States that she has been unable to tolerate the doxycycline.  Presents today with worsening redness and swelling to the area.      PMH:  Past Medical History:   Diagnosis Date    Asthma     Asthma with COPD     last assessed-9/1/2017    Closed fracture of fourth lumbar vertebra (HCC)     unspecified fracture morphology, initial jsvvldqyf-lsnemsmy-9/31/2017    COPD (chronic obstructive pulmonary disease) ([facility])     Heart murmur     last assessed-5/4/2012    Laceration of finger     last assessed-2/26/2014    Lipoma     last assessed-5/3/2013    Migraine     last assessed-1/16/2013    Multiple sclerosis (HCC)     last assessed-11/14/20172227-[id number]-iduengskp    Obstructive sleep apnea     last assessed-9/23/2013    Osteoarthritis     last  "assessed-5/4/2012    Post traumatic stress disorder     last assessed-12/13/2013    Tenosynovitis of thumb     left thumb-septic-last assessed-3/26/2014    Weight gain     last assessed-10/17/2013-50lb in about a year with frequent steroids       PSH:  Past Surgical History:   Procedure Laterality Date    ABCESS DRAINAGE  06/27/2022    FINGER SURGERY      KNEE ARTHROSCOPY W/ MENISCAL REPAIR Right     description: 4/2012-KNEE ARTHROSCOPY W/ MEDIAL MENISCAL REPAIR          PE:   Vitals:    08/31/24 2156   BP: 136/86   BP Location: Left arm   Pulse: 93   Resp: 18   Temp: 98 °F (36.7 °C)   TempSrc: Oral   SpO2: 96%   Weight: 77.1 kg (170 lb)   Height: 5' 1\" (1.549 m)         Constitutional: Vital signs are normal. She appears well-developed. She is cooperative. No distress.   Cardiovascular: Normal rate, regular rhythm.  Pulmonary/Chest: Effort normal.   Abdominal: Soft. Normal appearance.   Neurological: She is alert.  MSK: Left breast abscess located at approximately 4:00 in relation to the nipple.  Skin: Skin is warm, dry and intact.   Psychiatric: She has a normal mood and affect. Her speech is normal and behavior is normal. Thought content normal.        A:  -63-year-old female who presents for reevaluation of breast abscess.      P:  -Will perform incision and drainage.  Will switch antibiotics to Bactrim.      - 13 point ROS was performed and all are normal unless stated in the history above.   - Nursing note reviewed. Vitals reviewed.   - Orders placed by myself and/or advanced practitioner / resident.    - Previous chart was reviewed  - No language barrier.   - History obtained from patient.   - There are no limitations to the history obtained. Reasons ROS could not be obtained:  N/A         Medications   acetaminophen (TYLENOL) tablet 650 mg (650 mg Oral Given 8/31/24 2302)   ibuprofen (MOTRIN) tablet 600 mg (600 mg Oral Given 8/31/24 2302)   lidocaine-epinephrine (XYLOCAINE/EPINEPHRINE) 1 %-1:100,000 " injection 1 mL (1 mL Infiltration Given by Other 8/31/24 5087)   sulfamethoxazole-trimethoprim (BACTRIM DS) 800-160 mg per tablet 2 tablet (2 tablets Oral Given 9/1/24 0001)     No orders to display     No orders of the defined types were placed in this encounter.    Labs Reviewed - No data to display  Time reflects when diagnosis was documented in both MDM as applicable and the Disposition within this note       Time User Action Codes Description Comment    8/31/2024 11:52 PM Christelle Chavez [N61.1] Left breast abscess           ED Disposition       ED Disposition   Discharge    Condition   Stable    Date/Time   Sat Aug 31, 2024 11:52 PM    Comment   Minerva Amos discharge to home/self care.                   Follow-up Information       Follow up With Specialties Details Why Contact Info Additional Information    Barbara Cruz MD Family Medicine Schedule an appointment as soon as possible for a visit   [address] [place] [id number]  [phone]       [facility] Emergency Department Emergency Medicine Go to  If symptoms worsen [address] [place] [address] [id number] [phone] [facility] Emergency Department, [address] [place] [address], [id number]   [phone]    [facility]  Call   [phone]             Patient's Medications   Discharge Prescriptions    ONDANSETRON (ZOFRAN-ODT) 4 MG DISINTEGRATING TABLET    Take 1 tablet (4 mg total) by mouth every 6 (six) hours as needed for nausea or vomiting       Start Date: 8/31/2024 End Date: --       Order Dose: 4 mg       Quantity: 10 tablet    Refills: 0    SULFAMETHOXAZOLE-TRIMETHOPRIM (BACTRIM DS) 800-160 MG PER TABLET    Take 2 tablets by mouth 2 (two) times a day for 7 days smx-tmp DS (BACTRIM) 800-160 mg tabs (1tab q12 D10)       Start Date: 8/31/2024 End Date: 9/7/2024       Order Dose: 2 tablets       Quantity: 28 tablet    Refills: 0     No discharge  "procedures on file.  Prior to Admission Medications   Prescriptions Last Dose Informant Patient Reported? Taking?   Dulera 100-5 MCG/ACT inhaler   No No   Sig: INHALE 2 PUFFS BY MOUTH 2 TIMES A DAY RINSE MOUTH AFTER USE.   FLUoxetine (PROzac) 10 mg capsule   No No   Sig: TAKE 1 CAPSULE BY MOUTH EVERY DAY   SUMAtriptan (IMITREX) 100 mg tablet   No No   Sig: TAKE 1 TABLET (100 MG TOTAL) BY MOUTH ONCE AS NEEDED FOR MIGRAINE FOR UP TO 1 DOSE**DUE 8/17   Ventolin  (90 Base) MCG/ACT inhaler   No No   Sig: INHALE 2 PUFFS BY MOUTH EVERY 4 HOURS AS NEEDED FOR WHEEZE   albuterol (2.5 mg/3 mL) 0.083 % nebulizer solution  Self No No   Sig: Take 1 vial (2.5 mg total) by nebulization every 4 (four) hours as needed for wheezing or shortness of breath   baclofen 10 mg tablet   No No   Sig: TAKE 1 TABLET (10 MG TOTAL) BY MOUTH DAILY AT BEDTIME AS NEEDED FOR MUSCLE SPASMS   brompheniramine-pseudoephedrine-DM 30-2-10 MG/5ML syrup  Self No No   Sig: Take 10 mL by mouth 4 (four) times a day as needed for allergies   Patient not taking: Reported on 10/5/2022   cetirizine (ZyrTEC) 10 mg tablet   No No   Sig: TAKE 1 TABLET BY MOUTH EVERY DAY   gabapentin (NEURONTIN) 300 mg capsule   No No   Sig: TAKE 1 CAPSULE IN THE MORNING AND NOON AND 2 CAPSULES BEFORE BEDTIME   lidocaine (Lidoderm) 5 %   No No   Sig: Apply 1 patch topically over 12 hours daily for 5 days Remove & Discard patch within 12 hours or as directed by MD   zolpidem (AMBIEN) 5 mg tablet  Self No No   Sig: Take 1 tablet (5 mg total) by mouth daily at bedtime as needed for sleep      Facility-Administered Medications: None       Portions of the record may have been created with voice recognition software. Occasional wrong word or \"sound a like\" substitutions may have occurred due to the inherent limitations of voice recognition software. Read the chart carefully and recognize, using context, where substitutions have occurred.     ED Course         Critical Care " Time  Procedures

## 2024-09-01 NOTE — DISCHARGE INSTRUCTIONS
Please follow up with your PCP for reevaluation of your wound in 2-3 days. You have been prescribed Bactrim (an antibiotic) to be taken as two pills twice per day for the next seven days.    Please return to the Emergency Department if you experience worsening of your current symptoms, high fevers, worsening pain/redness/swelling, inability to tolerate antibiotics, or any new/other concerning symptoms.

## 2024-09-02 NOTE — ED ATTENDING ATTESTATION
9/1/2024  I, Greg Rios MD, saw and evaluated the patient. I have discussed the patient with the resident/non-physician practitioner and agree with the resident's/non-physician practitioner's findings, Plan of Care, and MDM as documented in the resident's/non-physician practitioner's note, except where noted. All available labs and Radiology studies were reviewed.  I was present for key portions of any procedure(s) performed by the resident/non-physician practitioner and I was immediately available to provide assistance.       At this point I agree with the current assessment done in the Emergency Department.  I have conducted an independent evaluation of this patient a history and physical is as follows:      Final Diagnosis:  1. Wound check, abscess      Chief Complaint   Patient presents with    Personal Problem     Pt presents with mas on left breast, states that mass has been growing past several days. Pt endorses pain, denies drainage.          63-year-old female who presents for wound reevaluation.  Patient seen yesterday for incision and drainage of left breast abscess.  Incision site no longer draining.  However, patient still complains of pain and redness around the area.  She is taking the Bactrim.      PMH:  Past Medical History:   Diagnosis Date    Asthma     Asthma with COPD     last assessed-9/1/2017    Closed fracture of fourth lumbar vertebra (HCC)     unspecified fracture morphology, initial vxeyxsidi-cuudxuba-6/31/2017    COPD (chronic obstructive pulmonary disease) (HCC)     Heart murmur     last assessed-5/4/2012    Laceration of finger     last assessed-2/26/2014    Lipoma     last assessed-5/3/2013    Migraine     last assessed-1/16/2013    Multiple sclerosis (HCC)     last assessed-11/14/20171419-tlpionspq-higkhwrum    Obstructive sleep apnea     last assessed-9/23/2013    Osteoarthritis     last assessed-5/4/2012    Post traumatic stress disorder     last assessed-12/13/2013     Tenosynovitis of thumb     left thumb-septic-last assessed-3/26/2014    Weight gain     last assessed-10/17/2013-50lb in about a year with frequent steroids       PSH:  Past Surgical History:   Procedure Laterality Date    ABCESS DRAINAGE  06/27/2022    FINGER SURGERY      KNEE ARTHROSCOPY W/ MENISCAL REPAIR Right     description: 4/2012-KNEE ARTHROSCOPY W/ MEDIAL MENISCAL REPAIR          PE:   Vitals:    09/01/24 2248 09/01/24 2250   BP: 146/95    BP Location: Left arm    Pulse: 90    Resp: 17    Temp:  (!) 97.1 °F (36.2 °C)   TempSrc:  Temporal   SpO2: 96%          Constitutional: Vital signs are normal. She appears well-developed. She is cooperative. No distress.   Cardiovascular: Normal rate, regular rhythm.  Pulmonary/Chest: Effort normal.  Erythema appears similar to my exam yesterday.  Area no longer fluctuant.  No evidence of underlying abscess.  Minimal warmth.  Abdominal: Soft. Normal appearance.   Neurological: She is alert.   Skin: Skin is warm, dry and intact.   Psychiatric: She has a normal mood and affect. Her speech is normal and behavior is normal. Thought content normal.        A:  -63-year-old female who presents for wound reevaluation.      P:  -Site overall improved from yesterday.  Encouraged patient to continue Bactrim.  Will be at least 48 hours from starting antibiotics to see improvement in the cellulitis.  Continue taking Tylenol and Motrin for pain.  Warm compresses.      - 13 point ROS was performed and all are normal unless stated in the history above.   - Nursing note reviewed. Vitals reviewed.   - Orders placed by myself and/or advanced practitioner / resident.    - Previous chart was reviewed  - No language barrier.   - History obtained from patient.   - There are no limitations to the history obtained. Reasons ROS could not be obtained:  N/A         Medications   ketorolac (TORADOL) injection 15 mg (15 mg Intramuscular Given 9/1/24 2082)     No orders to display     No orders of the  defined types were placed in this encounter.    Labs Reviewed - No data to display  Time reflects when diagnosis was documented in both MDM as applicable and the Disposition within this note       Time User Action Codes Description Comment    9/1/2024 11:55 PM Greg Rios Add [Z51.89] Wound check, abscess           ED Disposition       ED Disposition   Discharge    Condition   Stable    Date/Time   Sun Sep 1, 2024 11:55 PM    Comment   Minerva Amos discharge to home/self care.                   Follow-up Information    None       Patient's Medications   Discharge Prescriptions    No medications on file     No discharge procedures on file.  Prior to Admission Medications   Prescriptions Last Dose Informant Patient Reported? Taking?   Dulera 100-5 MCG/ACT inhaler   No No   Sig: INHALE 2 PUFFS BY MOUTH 2 TIMES A DAY RINSE MOUTH AFTER USE.   FLUoxetine (PROzac) 10 mg capsule   No No   Sig: TAKE 1 CAPSULE BY MOUTH EVERY DAY   SUMAtriptan (IMITREX) 100 mg tablet   No No   Sig: TAKE 1 TABLET (100 MG TOTAL) BY MOUTH ONCE AS NEEDED FOR MIGRAINE FOR UP TO 1 DOSE**DUE 8/17   Ventolin  (90 Base) MCG/ACT inhaler   No No   Sig: INHALE 2 PUFFS BY MOUTH EVERY 4 HOURS AS NEEDED FOR WHEEZE   albuterol (2.5 mg/3 mL) 0.083 % nebulizer solution  Self No No   Sig: Take 1 vial (2.5 mg total) by nebulization every 4 (four) hours as needed for wheezing or shortness of breath   baclofen 10 mg tablet   No No   Sig: TAKE 1 TABLET (10 MG TOTAL) BY MOUTH DAILY AT BEDTIME AS NEEDED FOR MUSCLE SPASMS   brompheniramine-pseudoephedrine-DM 30-2-10 MG/5ML syrup  Self No No   Sig: Take 10 mL by mouth 4 (four) times a day as needed for allergies   Patient not taking: Reported on 10/5/2022   cetirizine (ZyrTEC) 10 mg tablet   No No   Sig: TAKE 1 TABLET BY MOUTH EVERY DAY   gabapentin (NEURONTIN) 300 mg capsule   No No   Sig: TAKE 1 CAPSULE IN THE MORNING AND NOON AND 2 CAPSULES BEFORE BEDTIME   lidocaine (Lidoderm) 5 %   No No   Sig: Apply  "1 patch topically over 12 hours daily for 5 days Remove & Discard patch within 12 hours or as directed by MD   ondansetron (ZOFRAN-ODT) 4 mg disintegrating tablet   No No   Sig: Take 1 tablet (4 mg total) by mouth every 6 (six) hours as needed for nausea or vomiting   sulfamethoxazole-trimethoprim (BACTRIM DS) 800-160 mg per tablet   No No   Sig: Take 2 tablets by mouth 2 (two) times a day for 7 days smx-tmp DS (BACTRIM) 800-160 mg tabs (1tab q12 D10)   zolpidem (AMBIEN) 5 mg tablet  Self No No   Sig: Take 1 tablet (5 mg total) by mouth daily at bedtime as needed for sleep      Facility-Administered Medications: None       Portions of the record may have been created with voice recognition software. Occasional wrong word or \"sound a like\" substitutions may have occurred due to the inherent limitations of voice recognition software. Read the chart carefully and recognize, using context, where substitutions have occurred.     ED Course         Critical Care Time  Procedures      "

## 2024-09-02 NOTE — ED PROVIDER NOTES
History  Chief Complaint   Patient presents with    Personal Problem     Pt presents with mas on left breast, states that mass has been growing past several days. Pt endorses pain, denies drainage.      Patient is a 63-year-old female with past medical history of fibromyalgia, chronic pain disorder, asthma, and recent drainage of left breast abscess who presents to the emergency department for complaint of left breast abscess.  Patient reporting that she developed a abscess underneath the left breast spontaneously about 3 days ago, reports she has been the emergency department every day for the last 3 days because of this, reports initially it was drained with a needle, placed on antibiotic, did not tolerate this antibiotic, return to the emergency department where she received a incision and drainage with a scalpel, has been placed on Bactrim, has taken a total of 2 doses of Bactrim, is now reporting back to the emergency department for increased redness, throbbing pain, reporting no new drainage, no fevers, no chest pain shortness of breath abdominal pain nausea or vomiting.        Prior to Admission Medications   Prescriptions Last Dose Informant Patient Reported? Taking?   Dulera 100-5 MCG/ACT inhaler   No No   Sig: INHALE 2 PUFFS BY MOUTH 2 TIMES A DAY RINSE MOUTH AFTER USE.   FLUoxetine (PROzac) 10 mg capsule   No No   Sig: TAKE 1 CAPSULE BY MOUTH EVERY DAY   SUMAtriptan (IMITREX) 100 mg tablet   No No   Sig: TAKE 1 TABLET (100 MG TOTAL) BY MOUTH ONCE AS NEEDED FOR MIGRAINE FOR UP TO 1 DOSE**DUE 8/17   Ventolin  (90 Base) MCG/ACT inhaler   No No   Sig: INHALE 2 PUFFS BY MOUTH EVERY 4 HOURS AS NEEDED FOR WHEEZE   albuterol (2.5 mg/3 mL) 0.083 % nebulizer solution  Self No No   Sig: Take 1 vial (2.5 mg total) by nebulization every 4 (four) hours as needed for wheezing or shortness of breath   baclofen 10 mg tablet   No No   Sig: TAKE 1 TABLET (10 MG TOTAL) BY MOUTH DAILY AT BEDTIME AS NEEDED FOR MUSCLE  SPASMS   brompheniramine-pseudoephedrine-DM 30-2-10 MG/5ML syrup  Self No No   Sig: Take 10 mL by mouth 4 (four) times a day as needed for allergies   Patient not taking: Reported on 10/5/2022   cetirizine (ZyrTEC) 10 mg tablet   No No   Sig: TAKE 1 TABLET BY MOUTH EVERY DAY   gabapentin (NEURONTIN) 300 mg capsule   No No   Sig: TAKE 1 CAPSULE IN THE MORNING AND NOON AND 2 CAPSULES BEFORE BEDTIME   lidocaine (Lidoderm) 5 %   No No   Sig: Apply 1 patch topically over 12 hours daily for 5 days Remove & Discard patch within 12 hours or as directed by MD   ondansetron (ZOFRAN-ODT) 4 mg disintegrating tablet   No No   Sig: Take 1 tablet (4 mg total) by mouth every 6 (six) hours as needed for nausea or vomiting   sulfamethoxazole-trimethoprim (BACTRIM DS) 800-160 mg per tablet   No No   Sig: Take 2 tablets by mouth 2 (two) times a day for 7 days smx-tmp DS (BACTRIM) 800-160 mg tabs (1tab q12 D10)   zolpidem (AMBIEN) 5 mg tablet  Self No No   Sig: Take 1 tablet (5 mg total) by mouth daily at bedtime as needed for sleep      Facility-Administered Medications: None       Past Medical History:   Diagnosis Date    Asthma     Asthma with COPD     last assessed-9/1/2017    Closed fracture of fourth lumbar vertebra (HCC)     unspecified fracture morphology, initial dijvfzqiw-tmoedhpe-2/31/2017    COPD (chronic obstructive pulmonary disease) (HCC)     Heart murmur     last assessed-5/4/2012    Laceration of finger     last assessed-2/26/2014    Lipoma     last assessed-5/3/2013    Migraine     last assessed-1/16/2013    Multiple sclerosis (HCC)     last assessed-11/14/20176914-nyezlevci-ozolkctvr    Obstructive sleep apnea     last assessed-9/23/2013    Osteoarthritis     last assessed-5/4/2012    Post traumatic stress disorder     last assessed-12/13/2013    Tenosynovitis of thumb     left thumb-septic-last assessed-3/26/2014    Weight gain     last assessed-10/17/2013-50lb in about a year with frequent steroids       Past Surgical  History:   Procedure Laterality Date    [facility] DRAINAGE  06/27/2022    FINGER SURGERY      KNEE ARTHROSCOPY W/ MENISCAL REPAIR Right     description: 4/2012-KNEE ARTHROSCOPY W/ MEDIAL MENISCAL REPAIR        Family History   Problem Relation Age of Onset    Hepatitis Mother         C    Asthma Father     COPD Father     Diabetes Father     Hypertension Father     Asthma Brother      I have reviewed and agree with the history as documented.    E-Cigarette/Vaping    E-Cigarette Use Never User      E-Cigarette/Vaping Substances    THC No     CBD No      Social History     Tobacco Use    Smoking status: Every Day     Current packs/day: 0.25     Average packs/day: 0.3 packs/day for 25.0 years (6.3 ttl pk-yrs)     Types: Cigarettes     Start date: 12/2/2022    Smokeless tobacco: Never   Vaping Use    Vaping status: Never Used   Substance Use Topics    Alcohol use: Not Currently    Drug use: No        Review of Systems    Physical Exam  ED Triage Vitals   Temperature Pulse Respirations Blood Pressure SpO2   09/01/24 2250 09/01/24 2248 09/01/24 2248 09/01/24 2248 09/01/24 2248   (!) 97.1 °F (36.2 °C) 90 17 146/95 96 %      Temp Source Heart Rate Source Patient Position - Orthostatic VS BP Location FiO2 (%)   09/01/24 2250 09/01/24 2248 09/01/24 2248 09/01/24 2248 --   Temporal Monitor Sitting Left arm       Pain Score       09/01/24 2342       8             Orthostatic Vital Signs  Vitals:    09/01/24 2248   BP: 146/95   Pulse: 90   Patient Position - Orthostatic VS: Sitting       Physical Exam  Vitals and nursing note reviewed.   Constitutional:       General: She is not in acute distress.     Appearance: She is well-developed. She is not ill-appearing or toxic-appearing.   HENT:      Head: Normocephalic and atraumatic.   Eyes:      Conjunctiva/sclera: Conjunctivae normal.   Cardiovascular:      Rate and Rhythm: Normal rate and regular rhythm.      Heart sounds: No murmur heard.  Pulmonary:      Effort: Pulmonary effort is  normal. No respiratory distress.      Breath sounds: Normal breath sounds. No wheezing, rhonchi or rales.   Abdominal:      Palpations: Abdomen is soft.      Tenderness: There is no abdominal tenderness.   Musculoskeletal:         General: No swelling, deformity or signs of injury.      Cervical back: Neck supple.   Skin:     General: Skin is warm and dry.      Capillary Refill: Capillary refill takes less than 2 seconds.      Findings: Erythema present. No rash.      Comments: Underneath the left breast there is an erythematous patch with a well-healed incision buddy that is not actively bleeding, no evidence of pustular drainage, consistent with local erythema/cellulitis secondary to breast abscess, no palpable area of fluctuance, minimal tenderness to palpation   Neurological:      General: No focal deficit present.      Mental Status: She is alert.      Motor: No weakness.   Psychiatric:         Mood and Affect: Mood normal.         ED Medications  Medications   ketorolac (TORADOL) injection 15 mg (15 mg Intramuscular Given 9/1/24 3024)       Diagnostic Studies  Results Reviewed       None                   No orders to display         Procedures  Procedures      ED Course                             SBIRT 22yo+      Flowsheet Row Most Recent Value   Initial Alcohol Screen: US AUDIT-C     1. How often do you have a drink containing alcohol? 0 Filed at: 09/01/2024 2250   2. How many drinks containing alcohol do you have on a typical day you are drinking?  0 Filed at: 09/01/2024 2250   3b. FEMALE Any Age, or MALE 65+: How often do you have 4 or more drinks on one occassion? 0 Filed at: 09/01/2024 2250   Audit-C Score 0 Filed at: 09/01/2024 2250   VANI: How many times in the past year have you...    Used an illegal drug or used a prescription medication for non-medical reasons? Never Filed at: 09/01/2024 2250                  Medical Decision Making  Vital signs on arrival within normal limits. On exam patient is  seen in bed, alert, oriented, no evidence respiratory distress, previous left breast abscess status postdrainage without evidence of current abscess.    History and physical exam most consistent with soft tissue skin irritation/erythema secondary to left breast abscess. However, differential diagnosis included but not limited to left breast cellulitis. Plan Toradol for pain control, counseling patient regarding continued use of Bactrim.    View ED course above for further discussion on patient workup.     All labs reviewed and utilized in the medical decision making process  All radiology studies independently viewed by me and interpreted by the radiologist.  I reviewed all testing with the patient.     Upon re-evaluation patient remains hemodynamically stable, no evidence of abscess or need for emergent intervention/drainage.    Pain control improved after administration of Toradol return precautions given, PCP follow-up recommended, counseled regarding timeframe of Bactrim administration.       Risk  Prescription drug management.          Disposition  Final diagnoses:   Wound check, abscess     Time reflects when diagnosis was documented in both MDM as applicable and the Disposition within this note       Time User Action Codes Description Comment    9/1/2024 11:55 PM Greg Rios Add [Z51.89] Wound check, abscess           ED Disposition       ED Disposition   Discharge    Condition   Stable    Date/Time   Sun Sep 1, 2024 0591    Comment   Minerva Amos discharge to home/self care.                   Follow-up Information       Follow up With Specialties Details Why Contact Info    Barbara Cruz MD Family Medicine Schedule an appointment as soon as possible for a visit in 1 week  [address] [place] [id number] [phone]              Discharge Medication List as of 9/2/2024 12:06 AM        CONTINUE these medications which have NOT CHANGED    Details   albuterol (2.5 mg/3  mL) 0.083 % nebulizer solution Take 1 vial (2.5 mg total) by nebulization every 4 (four) hours as needed for wheezing or shortness of breath, Starting Fri 10/2/2020, Normal      baclofen 10 mg tablet TAKE 1 TABLET (10 MG TOTAL) BY MOUTH DAILY AT BEDTIME AS NEEDED FOR MUSCLE SPASMS, Starting Mon 6/26/2023, Normal      brompheniramine-pseudoephedrine-DM 30-2-10 MG/5ML syrup Take 10 mL by mouth 4 (four) times a day as needed for allergies, Starting Mon 4/11/2022, Normal      cetirizine (ZyrTEC) 10 mg tablet TAKE 1 TABLET BY MOUTH EVERY DAY, Normal      Dulera 100-5 MCG/ACT inhaler INHALE 2 PUFFS BY MOUTH 2 TIMES A DAY RINSE MOUTH AFTER USE., Normal      FLUoxetine (PROzac) 10 mg capsule TAKE 1 CAPSULE BY MOUTH EVERY DAY, Normal      gabapentin (NEURONTIN) 300 mg capsule TAKE 1 CAPSULE IN THE MORNING AND NOON AND 2 CAPSULES BEFORE BEDTIME, Normal      lidocaine (Lidoderm) 5 % Apply 1 patch topically over 12 hours daily for 5 days Remove & Discard patch within 12 hours or as directed by MD, Starting Mon 2/20/2023, Until Sat 2/25/2023, Normal      ondansetron (ZOFRAN-ODT) 4 mg disintegrating tablet Take 1 tablet (4 mg total) by mouth every 6 (six) hours as needed for nausea or vomiting, Starting Sat 8/31/2024, Normal      sulfamethoxazole-trimethoprim (BACTRIM DS) 800-160 mg per tablet Take 2 tablets by mouth 2 (two) times a day for 7 days smx-tmp DS (BACTRIM) 800-160 mg tabs (1tab q12 D10), Starting Sat 8/31/2024, Until Sat 9/7/2024, Normal      SUMAtriptan (IMITREX) 100 mg tablet TAKE 1 TABLET (100 MG TOTAL) BY MOUTH ONCE AS NEEDED FOR MIGRAINE FOR UP TO 1 DOSE**DUE 8/17, Normal      Ventolin  (90 Base) MCG/ACT inhaler INHALE 2 PUFFS BY MOUTH EVERY 4 HOURS AS NEEDED FOR WHEEZE, Normal      zolpidem (AMBIEN) 5 mg tablet Take 1 tablet (5 mg total) by mouth daily at bedtime as needed for sleep, Starting Wed 10/5/2022, Normal           No discharge procedures on file.    PDMP Review       None             ED  Provider  Attending physically available and evaluated Minerva Amos. I managed the patient along with the ED Attending.    Electronically Signed by           Deng Schmidt DO  09/02/24 [id number]

## 2024-09-02 NOTE — DISCHARGE INSTRUCTIONS
Please return to the emergency department if you develop new or worsening symptoms including fever, chest pain, shortness of breath, worsening pain, drainage of pus, or nausea and vomiting that does not resolve on its own.    Please follow-up with your primary care provider for additional care and management of your right breast pain secondary to infection.     Please continue to take your home medications as prescribed, please continue to take Motrin / Tylenol as needed for pain and your antibiotic Bactrim as prescribed.

## 2024-09-07 NOTE — ED PROVIDER NOTES
History  Chief Complaint   Patient presents with    Abscess     Patient reports she was seen and treated yesterday for an abscess on her left breast that today has doubled in size and is red and warm to the touch. Patient denies fevers, states she did have 2x episodes of vomiting today.     HPI  Patient is a 63 y.o. female who presents to the ED for evaluation of left breast abscess s/p needle aspiration yesterday but has since worsened.  Patient states she has taken 2 doses of doxycycline, but has had an episode of vomiting and cannot tolerate the antibiotic.  She denies any fevers or chills.  No drainage from the site of the needle aspiration.  She denies any other complaints or concerns at this time.    Prior to Admission Medications   Prescriptions Last Dose Informant Patient Reported? Taking?   Dulera 100-5 MCG/ACT inhaler   No No   Sig: INHALE 2 PUFFS BY MOUTH 2 TIMES A DAY RINSE MOUTH AFTER USE.   FLUoxetine (PROzac) 10 mg capsule   No No   Sig: TAKE 1 CAPSULE BY MOUTH EVERY DAY   SUMAtriptan (IMITREX) 100 mg tablet   No No   Sig: TAKE 1 TABLET (100 MG TOTAL) BY MOUTH ONCE AS NEEDED FOR MIGRAINE FOR UP TO 1 DOSE**DUE 8/17   Ventolin  (90 Base) MCG/ACT inhaler   No No   Sig: INHALE 2 PUFFS BY MOUTH EVERY 4 HOURS AS NEEDED FOR WHEEZE   albuterol (2.5 mg/3 mL) 0.083 % nebulizer solution  Self No No   Sig: Take 1 vial (2.5 mg total) by nebulization every 4 (four) hours as needed for wheezing or shortness of breath   baclofen 10 mg tablet   No No   Sig: TAKE 1 TABLET (10 MG TOTAL) BY MOUTH DAILY AT BEDTIME AS NEEDED FOR MUSCLE SPASMS   brompheniramine-pseudoephedrine-DM 30-2-10 MG/5ML syrup  Self No No   Sig: Take 10 mL by mouth 4 (four) times a day as needed for allergies   Patient not taking: Reported on 10/5/2022   cetirizine (ZyrTEC) 10 mg tablet   No No   Sig: TAKE 1 TABLET BY MOUTH EVERY DAY   gabapentin (NEURONTIN) 300 mg capsule   No No   Sig: TAKE 1 CAPSULE IN THE MORNING AND NOON AND 2 CAPSULES  BEFORE BEDTIME   lidocaine (Lidoderm) 5 %   No No   Sig: Apply 1 patch topically over 12 hours daily for 5 days Remove & Discard patch within 12 hours or as directed by MD   zolpidem (AMBIEN) 5 mg tablet  Self No No   Sig: Take 1 tablet (5 mg total) by mouth daily at bedtime as needed for sleep      Facility-Administered Medications: None       Past Medical History:   Diagnosis Date    Asthma     Asthma with COPD     last assessed-9/1/2017    Closed fracture of fourth lumbar vertebra (HCC)     unspecified fracture morphology, initial wkvzcrfqy-jxagwbrs-1/31/2017    COPD (chronic obstructive pulmonary disease) ([facility])     Heart murmur     last assessed-5/4/2012    Laceration of finger     last assessed-2/26/2014    Lipoma     last assessed-5/3/2013    Migraine     last assessed-1/16/2013    Multiple sclerosis ([facility])     last assessed-11/14/20176404-anlosebbj-fhrxmqenw    Obstructive sleep apnea     last assessed-9/23/2013    Osteoarthritis     last assessed-5/4/2012    Post traumatic stress disorder     last assessed-12/13/2013    Tenosynovitis of thumb     left thumb-septic-last assessed-3/26/2014    Weight gain     last assessed-10/17/2013-50lb in about a year with frequent steroids       Past Surgical History:   Procedure Laterality Date    ABCESS DRAINAGE  06/27/2022    FINGER SURGERY      KNEE ARTHROSCOPY W/ MENISCAL REPAIR Right     description: 4/2012-KNEE ARTHROSCOPY W/ MEDIAL MENISCAL REPAIR        Family History   Problem Relation Age of Onset    Hepatitis Mother         C    Asthma Father     COPD Father     Diabetes Father     Hypertension Father     Asthma Brother      I have reviewed and agree with the history as documented.    E-Cigarette/Vaping    E-Cigarette Use Never User      E-Cigarette/Vaping Substances    THC No     CBD No      Social History     Tobacco Use    Smoking status: Every Day     Current packs/day: 0.25     Average packs/day: 0.3 packs/day for 25.0 years (6.3 ttl pk-yrs)     Types:  Cigarettes     Start date: 12/2/2022    Smokeless tobacco: Never   Vaping Use    Vaping status: Never Used   Substance Use Topics    Alcohol use: Not Currently    Drug use: No        Review of Systems  All other systems reviewed and negative unless otherwise stated in HPI above.    Physical Exam  ED Triage Vitals [08/31/24 2156]   Temperature Pulse Respirations Blood Pressure SpO2   98 °F (36.7 °C) 93 18 136/86 96 %      Temp Source Heart Rate Source Patient Position - Orthostatic VS BP Location FiO2 (%)   Oral Monitor Sitting Left arm --      Pain Score       8             Orthostatic Vital Signs  Vitals:    08/31/24 2156   BP: 136/86   Pulse: 93   Patient Position - Orthostatic VS: Sitting       Physical Exam  Vitals and nursing note reviewed.   Constitutional:       General: She is not in acute distress.     Appearance: She is not ill-appearing or diaphoretic.   HENT:      Head: Normocephalic and atraumatic.      Mouth/Throat:      Mouth: Mucous membranes are moist.   Eyes:      General: No scleral icterus.     Conjunctiva/sclera: Conjunctivae normal.   Cardiovascular:      Rate and Rhythm: Normal rate and regular rhythm.      Heart sounds: Normal heart sounds.   Pulmonary:      Effort: Pulmonary effort is normal. No respiratory distress.      Breath sounds: Normal breath sounds.   Abdominal:      Palpations: Abdomen is soft.      Tenderness: There is no abdominal tenderness. There is no guarding or rebound.   Musculoskeletal:         General: No deformity. Normal range of motion.      Cervical back: Normal range of motion and neck supple.   Skin:     General: Skin is warm.      Capillary Refill: Capillary refill takes less than 2 seconds.      Findings: Erythema (Left inferior breast with mild fluctuance consistent with abscess -see associated media for image) present. No rash.   Neurological:      Mental Status: She is alert. Mental status is at baseline.   Psychiatric:         Mood and Affect: Mood normal.          Behavior: Behavior normal.         ED Medications  Medications   acetaminophen (TYLENOL) tablet 650 mg (650 mg Oral Given 8/31/24 2302)   ibuprofen (MOTRIN) tablet 600 mg (600 mg Oral Given 8/31/24 2302)   lidocaine-epinephrine (XYLOCAINE/EPINEPHRINE) 1 %-1:100,000 injection 1 mL (1 mL Infiltration Given by Other 8/31/24 2302)   sulfamethoxazole-trimethoprim (BACTRIM DS) 800-160 mg per tablet 2 tablet (2 tablets Oral Given 9/1/24 0006)       Diagnostic Studies  Results Reviewed       None                   No orders to display         Procedures  Incision and drain    Date/Time: 8/31/2024 11:10 PM    Performed by: Christelle Downing DO  Authorized by: Christelle Downing DO  Universal Protocol:  Consent: Verbal consent obtained.  Risks and benefits: risks, benefits and alternatives were discussed  Consent given by: patient  Patient understanding: patient states understanding of the procedure being performed  Patient consent: the patient's understanding of the procedure matches consent given  Procedure consent: procedure consent matches procedure scheduled  Site marked: the operative site was marked  Required items: required blood products, implants, devices, and special equipment available  Patient identity confirmed: verbally with patient and arm band    Patient location:  ED  Location:     Type:  Abscess    Size:  3cm    Location:  Trunk    Trunk location:  L breast  Pre-procedure details:     Skin preparation:  Chloraprep  Anesthesia (see MAR for exact dosages):     Anesthesia method:  Local infiltration    Local anesthetic:  Lidocaine 1% WITH epi (5cc)  Procedure details:     Complexity:  Simple    Incision types:  Single straight    Scalpel blade:  11    Approach:  Open    Incision depth:  Subcutaneous    Wound management:  Probed and deloculated, irrigated with saline and extensive cleaning    Irrigation with saline:  With betadine, 100cc    Drainage:  Purulent    Drainage amount:  Moderate     Wound treatment:  Wound left open    Packing materials:  None  Post-procedure details:     Patient tolerance of procedure:  Tolerated well, no immediate complications        ED Course                             SBIRT 20yo+      Flowsheet Row Most Recent Value   Initial Alcohol Screen: US AUDIT-C     1. How often do you have a drink containing alcohol? 0 Filed at: 08/31/2024 2305   2. How many drinks containing alcohol do you have on a typical day you are drinking?  0 Filed at: 08/31/2024 2305   3b. FEMALE Any Age, or MALE 65+: How often do you have 4 or more drinks on one occassion? 0 Filed at: 08/31/2024 2305   Audit-C Score 0 Filed at: 08/31/2024 2305   VANI: How many times in the past year have you...    Used an illegal drug or used a prescription medication for non-medical reasons? Never Filed at: 08/31/2024 2305                  Medical Decision Making  ASSESSMENT: Patient is a 63 y.o. female who presents with left breast redness with needle aspiration yesterday now with worsening redness and inability to tolerate antibiotics.   DDX includes but not limited to: Abscess, cellulitis.   PLAN: Incision and drainage. Treated with Bactrim, will change antibiotic.     Discussed evaluation with findings and plan with patient. Advised on need for outpatient follow up, given information. Given return precautions verbally and in discharge instructions, confirmed with teach back method. Patient given prescription for Bactrim and advised appropriate. All questions answered. Patient expressed verbal understanding and is agreeable with plan for discharge with outpatient follow up.    Problems Addressed:  Left breast abscess: acute illness or injury    Amount and/or Complexity of Data Reviewed  External Data Reviewed: notes.     Details: Needle aspiration with purulent drainage yesterday, procedure note and ultrasound reviewed    Risk  OTC drugs.  Prescription drug management.          Disposition  Final diagnoses:   Left  breast abscess     Time reflects when diagnosis was documented in both MDM as applicable and the Disposition within this note       Time User Action Codes Description Comment    8/31/2024 11:52 PM Christelle Chavez [N61.1] Left breast abscess           ED Disposition       ED Disposition   Discharge    Condition   Stable    Date/Time   Sat Aug 31, 2024 2352    Comment   Minerva Amos discharge to home/self care.                   Follow-up Information       Follow up With Specialties Details Why Contact Info Additional Information    Barbara Cruz MD Family Medicine Schedule an appointment as soon as possible for a visit   [address] [place] [address] [id number] [phone]       [facility] Emergency Department Emergency Medicine Go to  If symptoms worsen [address] [facility] [address] [phone] [facility], [address] [place] [address], [address] [phone]    [employer]  Call   [phone]               Discharge Medication List as of 8/31/2024 11:55 PM        START taking these medications    Details   ondansetron (ZOFRAN-ODT) 4 mg disintegrating tablet Take 1 tablet (4 mg total) by mouth every 6 (six) hours as needed for nausea or vomiting, Starting Sat 8/31/2024, Normal      sulfamethoxazole-trimethoprim (BACTRIM DS) 800-160 mg per tablet Take 2 tablets by mouth 2 (two) times a day for 7 days smx-tmp DS (BACTRIM) 800-160 mg tabs (1tab q12 D10), Starting Sat 8/31/2024, Until Sat 9/7/2024, Normal           CONTINUE these medications which have NOT CHANGED    Details   albuterol (2.5 mg/3 mL) 0.083 % nebulizer solution Take 1 vial (2.5 mg total) by nebulization every 4 (four) hours as needed for wheezing or shortness of breath, Starting Fri 10/2/2020, Normal      baclofen 10 mg tablet TAKE 1 TABLET (10 MG TOTAL) BY MOUTH DAILY AT BEDTIME AS NEEDED FOR MUSCLE SPASMS, Starting Mon 6/26/2023,  Normal      brompheniramine-pseudoephedrine-DM 30-2-10 MG/5ML syrup Take 10 mL by mouth 4 (four) times a day as needed for allergies, Starting Mon 4/11/2022, Normal      cetirizine (ZyrTEC) 10 mg tablet TAKE 1 TABLET BY MOUTH EVERY DAY, Normal      Dulera 100-5 MCG/ACT inhaler INHALE 2 PUFFS BY MOUTH 2 TIMES A DAY RINSE MOUTH AFTER USE., Normal      FLUoxetine (PROzac) 10 mg capsule TAKE 1 CAPSULE BY MOUTH EVERY DAY, Normal      gabapentin (NEURONTIN) 300 mg capsule TAKE 1 CAPSULE IN THE MORNING AND NOON AND 2 CAPSULES BEFORE BEDTIME, Normal      lidocaine (Lidoderm) 5 % Apply 1 patch topically over 12 hours daily for 5 days Remove & Discard patch within 12 hours or as directed by MD, Starting Mon 2/20/2023, Until Sat 2/25/2023, Normal      SUMAtriptan (IMITREX) 100 mg tablet TAKE 1 TABLET (100 MG TOTAL) BY MOUTH ONCE AS NEEDED FOR MIGRAINE FOR UP TO 1 DOSE**DUE 8/17, Normal      Ventolin  (90 Base) MCG/ACT inhaler INHALE 2 PUFFS BY MOUTH EVERY 4 HOURS AS NEEDED FOR WHEEZE, Normal      zolpidem (AMBIEN) 5 mg tablet Take 1 tablet (5 mg total) by mouth daily at bedtime as needed for sleep, Starting Wed 10/5/2022, Normal           No discharge procedures on file.    PDMP Review       None             ED Provider  Attending physically available and evaluated Minerva HASEEB Terrazasna. I managed the patient along with the ED Attending.    Electronically Signed by           Christelle Downing DO  09/07/24 0699

## 2024-09-19 ENCOUNTER — TELEPHONE (OUTPATIENT)
Dept: HEMATOLOGY ONCOLOGY | Facility: CLINIC | Age: 64
End: 2024-09-19

## 2024-09-19 NOTE — TELEPHONE ENCOUNTER
Chart review has been completed. Patient has been referred to incorrect department for breast abscess.  Patient should be seen by General Surgery . I corrected referral and sent it to General Surgery for scheduling .

## 2024-11-14 DIAGNOSIS — F31.9 BIPOLAR 1 DISORDER (HCC): ICD-10-CM

## 2024-11-14 RX ORDER — FLUOXETINE 10 MG/1
10 CAPSULE ORAL DAILY
Qty: 90 CAPSULE | Refills: 0 | Status: SHIPPED | OUTPATIENT
Start: 2024-11-14

## 2024-11-15 NOTE — TELEPHONE ENCOUNTER
Called patient several times no answer and mailbox is full,  will send letter out to schedule appt prior to any refills.

## 2025-01-10 DIAGNOSIS — J44.89 ASTHMA WITH COPD (HCC): ICD-10-CM

## 2025-01-13 RX ORDER — ALBUTEROL SULFATE 90 UG/1
AEROSOL, METERED RESPIRATORY (INHALATION)
Qty: 6.7 G | Refills: 1 | Status: SHIPPED | OUTPATIENT
Start: 2025-01-13

## 2025-01-18 ENCOUNTER — HOSPITAL ENCOUNTER (EMERGENCY)
Facility: HOSPITAL | Age: 65
Discharge: HOME | End: 2025-01-18
Attending: EMERGENCY MEDICINE
Payer: COMMERCIAL

## 2025-01-18 ENCOUNTER — APPOINTMENT (EMERGENCY)
Dept: RADIOLOGY | Facility: HOSPITAL | Age: 65
End: 2025-01-18
Payer: COMMERCIAL

## 2025-01-18 VITALS
HEART RATE: 104 BPM | DIASTOLIC BLOOD PRESSURE: 78 MMHG | SYSTOLIC BLOOD PRESSURE: 168 MMHG | TEMPERATURE: 98.6 F | RESPIRATION RATE: 30 BRPM | OXYGEN SATURATION: 93 %

## 2025-01-18 DIAGNOSIS — J18.9 PNA (PNEUMONIA): ICD-10-CM

## 2025-01-18 DIAGNOSIS — J44.9 COPD (CHRONIC OBSTRUCTIVE PULMONARY DISEASE) (HCC): Primary | ICD-10-CM

## 2025-01-18 LAB
ALBUMIN SERPL BCG-MCNC: 3.8 G/DL (ref 3.5–5)
ALP SERPL-CCNC: 89 U/L (ref 34–104)
ALT SERPL W P-5'-P-CCNC: 37 U/L (ref 7–52)
ANION GAP SERPL CALCULATED.3IONS-SCNC: 9 MMOL/L (ref 4–13)
AST SERPL W P-5'-P-CCNC: 28 U/L (ref 13–39)
ATRIAL RATE: 117 BPM
BACTERIA UR QL AUTO: ABNORMAL /HPF
BASOPHILS # BLD AUTO: 0.05 THOUSANDS/ΜL (ref 0–0.1)
BASOPHILS NFR BLD AUTO: 0 % (ref 0–1)
BILIRUB SERPL-MCNC: 0.89 MG/DL (ref 0.2–1)
BILIRUB UR QL STRIP: NEGATIVE
BUN SERPL-MCNC: 8 MG/DL (ref 5–25)
CALCIUM SERPL-MCNC: 9.8 MG/DL (ref 8.4–10.2)
CHLORIDE SERPL-SCNC: 103 MMOL/L (ref 96–108)
CLARITY UR: CLEAR
CO2 SERPL-SCNC: 25 MMOL/L (ref 21–32)
COLOR UR: YELLOW
CREAT SERPL-MCNC: 0.65 MG/DL (ref 0.6–1.3)
EOSINOPHIL # BLD AUTO: 0.3 THOUSAND/ΜL (ref 0–0.61)
EOSINOPHIL NFR BLD AUTO: 2 % (ref 0–6)
ERYTHROCYTE [DISTWIDTH] IN BLOOD BY AUTOMATED COUNT: 12.6 % (ref 11.6–15.1)
FLUAV AG UPPER RESP QL IA.RAPID: NEGATIVE
FLUBV AG UPPER RESP QL IA.RAPID: NEGATIVE
GFR SERPL CREATININE-BSD FRML MDRD: 95 ML/MIN/1.73SQ M
GLUCOSE SERPL-MCNC: 154 MG/DL (ref 65–140)
GLUCOSE UR STRIP-MCNC: NEGATIVE MG/DL
HCT VFR BLD AUTO: 47.8 % (ref 34.8–46.1)
HGB BLD-MCNC: 15.2 G/DL (ref 11.5–15.4)
HGB UR QL STRIP.AUTO: ABNORMAL
IMM GRANULOCYTES # BLD AUTO: 0.06 THOUSAND/UL (ref 0–0.2)
IMM GRANULOCYTES NFR BLD AUTO: 1 % (ref 0–2)
KETONES UR STRIP-MCNC: NEGATIVE MG/DL
LEUKOCYTE ESTERASE UR QL STRIP: NEGATIVE
LYMPHOCYTES # BLD AUTO: 0.94 THOUSANDS/ΜL (ref 0.6–4.47)
LYMPHOCYTES NFR BLD AUTO: 8 % (ref 14–44)
MCH RBC QN AUTO: 27 PG (ref 26.8–34.3)
MCHC RBC AUTO-ENTMCNC: 31.8 G/DL (ref 31.4–37.4)
MCV RBC AUTO: 85 FL (ref 82–98)
MONOCYTES # BLD AUTO: 0.79 THOUSAND/ΜL (ref 0.17–1.22)
MONOCYTES NFR BLD AUTO: 6 % (ref 4–12)
MUCOUS THREADS UR QL AUTO: ABNORMAL
NEUTROPHILS # BLD AUTO: 10.41 THOUSANDS/ΜL (ref 1.85–7.62)
NEUTS SEG NFR BLD AUTO: 83 % (ref 43–75)
NITRITE UR QL STRIP: NEGATIVE
NON-SQ EPI CELLS URNS QL MICRO: ABNORMAL /HPF
NRBC BLD AUTO-RTO: 0 /100 WBCS
P AXIS: 65 DEGREES
PH UR STRIP.AUTO: 6 [PH]
PLATELET # BLD AUTO: 293 THOUSANDS/UL (ref 149–390)
PMV BLD AUTO: 9.8 FL (ref 8.9–12.7)
POTASSIUM SERPL-SCNC: 3.8 MMOL/L (ref 3.5–5.3)
PR INTERVAL: 150 MS
PROT SERPL-MCNC: 7.8 G/DL (ref 6.4–8.4)
PROT UR STRIP-MCNC: ABNORMAL MG/DL
QRS AXIS: 48 DEGREES
QRSD INTERVAL: 68 MS
QT INTERVAL: 316 MS
QTC INTERVAL: 441 MS
RBC # BLD AUTO: 5.63 MILLION/UL (ref 3.81–5.12)
RBC #/AREA URNS AUTO: ABNORMAL /HPF
SARS-COV+SARS-COV-2 AG RESP QL IA.RAPID: NEGATIVE
SODIUM SERPL-SCNC: 137 MMOL/L (ref 135–147)
SP GR UR STRIP.AUTO: 1.01 (ref 1–1.03)
T WAVE AXIS: 49 DEGREES
UROBILINOGEN UR STRIP-ACNC: <2 MG/DL
VENTRICULAR RATE: 117 BPM
WBC # BLD AUTO: 12.55 THOUSAND/UL (ref 4.31–10.16)
WBC #/AREA URNS AUTO: ABNORMAL /HPF

## 2025-01-18 PROCEDURE — 87811 SARS-COV-2 COVID19 W/OPTIC: CPT | Performed by: EMERGENCY MEDICINE

## 2025-01-18 PROCEDURE — 81001 URINALYSIS AUTO W/SCOPE: CPT

## 2025-01-18 PROCEDURE — 93005 ELECTROCARDIOGRAM TRACING: CPT

## 2025-01-18 PROCEDURE — 96374 THER/PROPH/DIAG INJ IV PUSH: CPT

## 2025-01-18 PROCEDURE — 87804 INFLUENZA ASSAY W/OPTIC: CPT | Performed by: EMERGENCY MEDICINE

## 2025-01-18 PROCEDURE — 80053 COMPREHEN METABOLIC PANEL: CPT

## 2025-01-18 PROCEDURE — 99285 EMERGENCY DEPT VISIT HI MDM: CPT

## 2025-01-18 PROCEDURE — 93010 ELECTROCARDIOGRAM REPORT: CPT | Performed by: INTERNAL MEDICINE

## 2025-01-18 PROCEDURE — 36415 COLL VENOUS BLD VENIPUNCTURE: CPT

## 2025-01-18 PROCEDURE — 96375 TX/PRO/DX INJ NEW DRUG ADDON: CPT

## 2025-01-18 PROCEDURE — 94640 AIRWAY INHALATION TREATMENT: CPT

## 2025-01-18 PROCEDURE — 85025 COMPLETE CBC W/AUTO DIFF WBC: CPT

## 2025-01-18 PROCEDURE — 71046 X-RAY EXAM CHEST 2 VIEWS: CPT

## 2025-01-18 PROCEDURE — 99285 EMERGENCY DEPT VISIT HI MDM: CPT | Performed by: EMERGENCY MEDICINE

## 2025-01-18 RX ORDER — ONDANSETRON 2 MG/ML
4 INJECTION INTRAMUSCULAR; INTRAVENOUS ONCE
Status: COMPLETED | OUTPATIENT
Start: 2025-01-18 | End: 2025-01-18

## 2025-01-18 RX ORDER — ALBUTEROL SULFATE 0.83 MG/ML
2.5 SOLUTION RESPIRATORY (INHALATION) EVERY 6 HOURS PRN
Qty: 30 ML | Refills: 0 | Status: SHIPPED | OUTPATIENT
Start: 2025-01-18

## 2025-01-18 RX ORDER — ACETAMINOPHEN 325 MG/1
975 TABLET ORAL ONCE
Status: COMPLETED | OUTPATIENT
Start: 2025-01-18 | End: 2025-01-18

## 2025-01-18 RX ORDER — PREDNISONE 20 MG/1
40 TABLET ORAL ONCE
Status: COMPLETED | OUTPATIENT
Start: 2025-01-18 | End: 2025-01-18

## 2025-01-18 RX ORDER — IPRATROPIUM BROMIDE AND ALBUTEROL SULFATE 2.5; .5 MG/3ML; MG/3ML
3 SOLUTION RESPIRATORY (INHALATION) ONCE
Status: COMPLETED | OUTPATIENT
Start: 2025-01-18 | End: 2025-01-18

## 2025-01-18 RX ORDER — KETOROLAC TROMETHAMINE 30 MG/ML
15 INJECTION, SOLUTION INTRAMUSCULAR; INTRAVENOUS ONCE
Status: COMPLETED | OUTPATIENT
Start: 2025-01-18 | End: 2025-01-18

## 2025-01-18 RX ORDER — DOXYCYCLINE 100 MG/1
100 CAPSULE ORAL 2 TIMES DAILY
Qty: 14 CAPSULE | Refills: 0 | Status: SHIPPED | OUTPATIENT
Start: 2025-01-18 | End: 2025-01-25

## 2025-01-18 RX ORDER — LIDOCAINE HYDROCHLORIDE 20 MG/ML
15 SOLUTION OROPHARYNGEAL ONCE
Status: COMPLETED | OUTPATIENT
Start: 2025-01-18 | End: 2025-01-18

## 2025-01-18 RX ADMIN — IPRATROPIUM BROMIDE AND ALBUTEROL SULFATE 3 ML: 2.5; .5 SOLUTION RESPIRATORY (INHALATION) at 10:11

## 2025-01-18 RX ADMIN — LIDOCAINE HYDROCHLORIDE 15 ML: 20 SOLUTION ORAL at 10:11

## 2025-01-18 RX ADMIN — PREDNISONE 40 MG: 20 TABLET ORAL at 08:44

## 2025-01-18 RX ADMIN — ACETAMINOPHEN 975 MG: 325 TABLET, FILM COATED ORAL at 11:05

## 2025-01-18 RX ADMIN — IPRATROPIUM BROMIDE AND ALBUTEROL SULFATE 3 ML: 2.5; .5 SOLUTION RESPIRATORY (INHALATION) at 08:44

## 2025-01-18 RX ADMIN — KETOROLAC TROMETHAMINE 15 MG: 30 INJECTION, SOLUTION INTRAMUSCULAR; INTRAVENOUS at 11:05

## 2025-01-18 RX ADMIN — ONDANSETRON 4 MG: 2 INJECTION INTRAMUSCULAR; INTRAVENOUS at 10:11

## 2025-01-18 NOTE — DISCHARGE INSTRUCTIONS
You were evaluated in the Emergency Department today for cough, wheezing, and congestion.     Can take motrin and tylenol together every 6 hours for pain and fever control. Take doxycycline twice a day for 7 days for pneumonia.     Please schedule an appointment with your primary care physician within the next 2-3 days.    Return to the Emergency Department if you experience worsening or uncontrolled pain, fevers 100.4°F or greater, recurrent vomiting, inability to tolerate food or fluids by mouth, bloody stools or vomit, black or tarry stools, or any other concerning symptoms.    Thank you for choosing us for your care.

## 2025-01-18 NOTE — ED ATTENDING ATTESTATION
1/18/2025  I, Carl Lamas MD, saw and evaluated the patient. I have discussed the patient with the resident/non-physician practitioner and agree with the resident's/non-physician practitioner's findings, Plan of Care, and MDM as documented in the resident's/non-physician practitioner's note, except where noted. All available labs and Radiology studies were reviewed.  I was present for key portions of any procedure(s) performed by the resident/non-physician practitioner and I was immediately available to provide assistance.       At this point I agree with the current assessment done in the Emergency Department.  I have conducted an independent evaluation of this patient a history and physical is as follows:    ED Course     64-year-old female, history of asthma, history of COPD, multiple sclerosis, presenting to the emergency department for evaluation of 1 day history of reported fever, cough, shortness of breath, wheezing.  Patient reports she has been using her albuterol MDI every 2-3 hours without relief.  No nausea, vomiting, diarrhea.    Patient denies any steroids in the past month, ED visits in the past month, intubation ever.    Past Medical History:   Diagnosis Date    Asthma     Asthma with COPD ([facility])     last assessed-9/1/2017    Closed fracture of fourth lumbar vertebra ([facility])     unspecified fracture morphology, initial orqazyogv-lgwrfmgp-2/31/2017    COPD (chronic obstructive pulmonary disease) ([facility])     Heart murmur     last assessed-5/4/2012    Laceration of finger     last assessed-2/26/2014    Lipoma     last assessed-5/3/2013    Migraine     last assessed-1/16/2013    Multiple sclerosis (HCC)     last assessed-11/14/20179087-wtxsftubh-mupmewlzv    Obstructive sleep apnea     last assessed-9/23/2013    Osteoarthritis     last assessed-5/4/2012    Post traumatic stress disorder     last assessed-12/13/2013    Tenosynovitis of thumb     left thumb-septic-last assessed-3/26/2014    Weight gain      last assessed-10/17/2013-50lb in about a year with frequent steroids     On examination the patient is noted to be mildly tachypneic in the high 20s.  Head is normocephalic atraumatic.  Eyelids lashes normal.  Mucosal membranes are moist.  Neck is supple.  Lungs with faint end expiratory wheezing.  Heart is tachycardic and regular.  Abdomen nondistended, soft and nontender.  Extremities unremarkable.    MEDICAL DECISION MAKING    Number and Complexity of Problems  Differential diagnosis: Asthma exacerbation secondary to viral illness, COPD exacerbation secondary to viral illness, pneumonia.    Medical Decision Making Data  External documents reviewed: No pertinent outpatient office visits available for review.  My EKG interpretation: Sinus tachycardia at 117 bpm.  My X-ray interpretation: Patchy airspace disease left lower lobe.      XR chest 2 views   ED Interpretation   Patchy airspace disease left lower lobe, atelectasis versus early infiltrate.      Final Result      No acute cardiopulmonary disease.            Workstation performed: [id number]             Labs Reviewed   UA W REFLEX TO MICROSCOPIC WITH REFLEX TO CULTURE - Abnormal       Result Value Ref Range Status    Color, UA Yellow   Final    Clarity, UA Clear   Final    Specific Gravity, UA 1.015  1.003 - 1.030 Final    pH, UA 6.0  4.5, 5.0, 5.5, 6.0, 6.5, 7.0, 7.5, 8.0 Final    Leukocytes, UA Negative  Negative Final    Nitrite, UA Negative  Negative Final    Protein, UA Trace (*) Negative mg/dl Final    Glucose, UA Negative  Negative mg/dl Final    Ketones, UA Negative  Negative mg/dl Final    Urobilinogen, UA <2.0  <2.0 mg/dl mg/dl Final    Bilirubin, UA Negative  Negative Final    Occult Blood, UA Trace (*) Negative Final   CBC AND DIFFERENTIAL - Abnormal    WBC 12.55 (*) 4.31 - 10.16 Thousand/uL Final    RBC 5.63 (*) 3.81 - 5.12 Million/uL Final    Hemoglobin 15.2  11.5 - 15.4 g/dL Final    Hematocrit 47.8 (*) 34.8 - 46.1 % Final    MCV 85  82 - 98 fL  Final    MCH 27.0  26.8 - 34.3 pg Final    MCHC 31.8  31.4 - 37.4 g/dL Final    RDW 12.6  11.6 - 15.1 % Final    MPV 9.8  8.9 - 12.7 fL Final    Platelets 293  149 - 390 Thousands/uL Final    nRBC 0  /100 WBCs Final    Segmented % 83 (*) 43 - 75 % Final    Immature Grans % 1  0 - 2 % Final    Lymphocytes % 8 (*) 14 - 44 % Final    Monocytes % 6  4 - 12 % Final    Eosinophils Relative 2  0 - 6 % Final    Basophils Relative 0  0 - 1 % Final    Absolute Neutrophils 10.41 (*) 1.85 - 7.62 Thousands/µL Final    Absolute Immature Grans 0.06  0.00 - 0.20 Thousand/uL Final    Absolute Lymphocytes 0.94  0.60 - 4.47 Thousands/µL Final    Absolute Monocytes 0.79  0.17 - 1.22 Thousand/µL Final    Eosinophils Absolute 0.30  0.00 - 0.61 Thousand/µL Final    Basophils Absolute 0.05  0.00 - 0.10 Thousands/µL Final   COMPREHENSIVE METABOLIC PANEL - Abnormal    Sodium 137  135 - 147 mmol/L Final    Potassium 3.8  3.5 - 5.3 mmol/L Final    Chloride 103  96 - 108 mmol/L Final    CO2 25  21 - 32 mmol/L Final    ANION GAP 9  4 - 13 mmol/L Final    BUN 8  5 - 25 mg/dL Final    Creatinine 0.65  0.60 - 1.30 mg/dL Final    Comment: Standardized to IDMS reference method    Glucose 154 (*) 65 - 140 mg/dL Final    Comment: If the patient is fasting, the ADA then defines impaired fasting glucose as > 100 mg/dL and diabetes as > or equal to 123 mg/dL.    Calcium 9.8  8.4 - 10.2 mg/dL Final    AST 28  13 - 39 U/L Final    ALT 37  7 - 52 U/L Final    Comment: Specimen collection should occur prior to Sulfasalazine administration due to the potential for falsely depressed results.     Alkaline Phosphatase 89  34 - 104 U/L Final    Total Protein 7.8  6.4 - 8.4 g/dL Final    Albumin 3.8  3.5 - 5.0 g/dL Final    Total Bilirubin 0.89  0.20 - 1.00 mg/dL Final    Comment: Use of this assay is not recommended for patients undergoing treatment with eltrombopag due to the potential for falsely elevated results.  N-acetyl-p-benzoquinone imine (metabolite of  Acetaminophen) will generate erroneously low results in samples for patients that have taken an overdose of Acetaminophen.    eGFR 95  ml/min/1.73sq m Final    Narrative:     National Kidney Disease Foundation guidelines for Chronic Kidney Disease (CKD):     Stage 1 with normal or high GFR (GFR > 90 mL/min/1.73 square meters)    Stage 2 Mild CKD (GFR = 60-89 mL/min/1.73 square meters)    Stage 3A Moderate CKD (GFR = 45-59 mL/min/1.73 square meters)    Stage 3B Moderate CKD (GFR = 30-44 mL/min/1.73 square meters)    Stage 4 Severe CKD (GFR = 15-29 mL/min/1.73 square meters)    Stage 5 End Stage CKD (GFR <15 mL/min/1.73 square meters)  Note: GFR calculation is accurate only with a steady state creatinine   URINE MICROSCOPIC - Abnormal    RBC, UA 1-2  None Seen, 1-2 /hpf Final    WBC, UA 1-2  None Seen, 1-2 /hpf Final    Epithelial Cells Occasional  None Seen, Occasional /hpf Final    Bacteria, UA None Seen  None Seen, Occasional /hpf Final    MUCUS THREADS Occasional (*) None Seen Final   COVID-19/INFLUENZA A/B RAPID ANTIGEN (30 MIN.TAT) - Normal    SARS COV Rapid Antigen Negative  Negative Final    Influenza A Rapid Antigen Negative  Negative Final    Influenza B Rapid Antigen Negative  Negative Final    Narrative:     This test has been performed using the [employer] Karrie 2 FLU+SARS Antigen test under the Emergency Use Authorization (EUA). This test has been validated by the  and verified by the performing laboratory. The Karrie uses lateral flow immunofluorescent sandwich assay to detect SARS-COV, Influenza A and Influenza B Antigen.     The Quidel Karrie 2 SARS Antigen test does not differentiate between SARS-CoV and SARS-CoV-2.     Negative results are presumptive and may be confirmed with a molecular assay, if necessary, for patient management. Negative results do not rule out SARS-CoV-2 or influenza infection and should not be used as the sole basis for treatment or patient management decisions. A  negative test result may occur if the level of antigen in a sample is below the limit of detection of this test.     Positive results are indicative of the presence of viral antigens, but do not rule out bacterial infection or co-infection with other viruses.     All test results should be used as an adjunct to clinical observations and other information available to the provider.    FOR PEDIATRIC PATIENTS - copy/paste COVID Guidelines URL to browser: [email or web address]       Labs reviewed by me are significant for:  No significant lab abnormalities.    Treatment and Disposition  ED course: Patient was administered multiple nebulizer treatments in the emergency department.  Steroids for asthma.  Chest x-ray interpreted as early lower lobe infiltrate.  Patient discharged on doxycycline with return precautions.  At the time of discharge, respiratory rate 24.  Shared decision making: Patient agreeable with plan.  Code status: Full code.                Critical Care Time  Procedures

## 2025-01-18 NOTE — ED PROVIDER NOTES
Time reflects when diagnosis was documented in both MDM as applicable and the Disposition within this note       Time User Action Codes Description Comment    1/18/2025 11:34 AM Kavon Theodore Add [J44.9] COPD (chronic obstructive pulmonary disease) (HCC)     1/18/2025 11:34 AM Kavon Theodore Add [J18.9] PNA (pneumonia)           ED Disposition       ED Disposition   Discharge    Condition   Stable    Date/Time   Sat Jan 18, 2025 11:37 AM    Comment   Minerva Amos discharge to home/self care.                   Assessment & Plan       Medical Decision Making  64-year-old female with past medical history of COPD, obstructive sleep apnea, MS presents to the ED for evaluation of subjective fever, cough, congestion for the past few days.  Patient notes she started having productive cough and wheezing this morning.  Her ex-boyfriend threw away her nebulizer machine so she does not have 1 at home at this time.  Patient notes that she has been having urinary frequency as well.  Roommate at home is sick with a viral illness.  Patient denies nausea, vomiting, chest pain, abdominal pain, no recent travels    On exam vital signs within normal limits with mild tachypnea and mild tachycardia.  Decreased breath sounds in bilateral lower lobes.  No peripheral edema    Differentials include but not limited to pneumonia, COPD exacerbation, viral illness, UTI    Evaluate with CBC, CMP, UA, chest x-ray  Will treat with DuoNebs  Upon reevaluation wheezing resolved  Labs significant for WBC of 12, UA negative, chest x-ray significant for lower lobe pneumonia.  Labs and imaging results discussed with patient expressed understanding agrees with plan for discharge home with prescription for doxycycline.  Patient instructed to follow-up with PCP.  Strict return precaution provided      Amount and/or Complexity of Data Reviewed  Labs: ordered. Decision-making details documented in ED Course.  Radiology: ordered and independent interpretation  performed.    Risk  OTC drugs.  Prescription drug management.        ED Course as of 01/23/25 0500   Sat Jan 18, 2025   0952 WBC(!): 12.55   0952 Leukocytes, UA: Negative   0952 Nitrite, UA: Negative   0952 Bacteria, UA: None Seen   0953 Creatinine: 0.65   0953 GLUCOSE(!): 154   0953 Sodium: 137   0953 Potassium: 3.8   1003 Wheezing resolved, respiration 25. PT reports improvement in her symptoms       Medications   ipratropium-albuterol (DUO-NEB) 0.5-2.5 mg/3 mL inhalation solution 3 mL (3 mL Nebulization Given 1/18/25 0844)   predniSONE tablet 40 mg (40 mg Oral Given 1/18/25 0844)   ondansetron (ZOFRAN) injection 4 mg (4 mg Intravenous Given 1/18/25 1011)   Lidocaine Viscous HCl (XYLOCAINE) 2 % mucosal solution 15 mL (15 mL Swish & Spit Given 1/18/25 1011)   ipratropium-albuterol (DUO-NEB) 0.5-2.5 mg/3 mL inhalation solution 3 mL (3 mL Nebulization Given 1/18/25 1011)   acetaminophen (TYLENOL) tablet 975 mg (975 mg Oral Given 1/18/25 1105)   ketorolac (TORADOL) injection 15 mg (15 mg Intravenous Given 1/18/25 1105)       ED Risk Strat Scores                          SBIRT 22yo+      Flowsheet Row Most Recent Value   Initial Alcohol Screen: US AUDIT-C     1. How often do you have a drink containing alcohol? 0 Filed at: 01/18/2025 0759   2. How many drinks containing alcohol do you have on a typical day you are drinking?  0 Filed at: 01/18/2025 0759   3a. Male UNDER 65: How often do you have five or more drinks on one occasion? 0 Filed at: 01/18/2025 0759   3b. FEMALE Any Age, or MALE 65+: How often do you have 4 or more drinks on one occassion? 0 Filed at: 01/18/2025 0759   Audit-C Score 0 Filed at: 01/18/2025 0759   VANI: How many times in the past year have you...    Used an illegal drug or used a prescription medication for non-medical reasons? Never Filed at: 01/18/2025 0759                            History of Present Illness       Chief Complaint   Patient presents with    Shortness of Breath     Patient  coming in for shortness of breath, wheezing, headache, coughing, and fever (101). Patient last took Tylenol around 8:00 pm.       Past Medical History:   Diagnosis Date    Asthma     Asthma with COPD ([facility])     last assessed-9/1/2017    Closed fracture of fourth lumbar vertebra ([facility])     unspecified fracture morphology, initial [id number]-[id number]-9/31/2017    COPD (chronic obstructive pulmonary disease) ([facility])     Heart murmur     last assessed-5/4/2012    Laceration of finger     last assessed-2/26/2014    Lipoma     last assessed-5/3/2013    Migraine     last assessed-1/16/2013    Multiple sclerosis ([facility])     last assessed-11/14/20170544-[id number]    Obstructive sleep apnea     last assessed-9/23/2013    Osteoarthritis     last assessed-5/4/2012    Post traumatic stress disorder     last assessed-12/13/2013    Tenosynovitis of thumb     left thumb-septic-last assessed-3/26/2014    Weight gain     last assessed-10/17/2013-50lb in about a year with frequent steroids      Past Surgical History:   Procedure Laterality Date    ABCESS DRAINAGE  06/27/2022    FINGER SURGERY      KNEE ARTHROSCOPY W/ MENISCAL REPAIR Right     description: 4/2012-KNEE ARTHROSCOPY W/ MEDIAL MENISCAL REPAIR       Family History   Problem Relation Age of Onset    Hepatitis Mother         C    Asthma Father     COPD Father     Diabetes Father     Hypertension Father     Asthma Brother       Social History     Tobacco Use    Smoking status: Every Day     Current packs/day: 0.25     Average packs/day: 0.3 packs/day for 25.0 years (6.3 ttl pk-yrs)     Types: Cigarettes     Start date: 12/2/2022    Smokeless tobacco: Never   Vaping Use    Vaping status: Never Used   Substance Use Topics    Alcohol use: Not Currently    Drug use: No      E-Cigarette/Vaping    E-Cigarette Use Never User       E-Cigarette/Vaping Substances    THC No     CBD No       I have reviewed and agree with the history as documented.     HPI    Review of Systems    Constitutional:  Positive for fever. Negative for appetite change, chills, diaphoresis and unexpected weight change.   HENT:  Positive for congestion. Negative for dental problem, ear pain, facial swelling, sore throat and trouble swallowing.    Eyes:  Negative for pain and visual disturbance.   Respiratory:  Positive for cough. Negative for chest tightness and shortness of breath.    Cardiovascular:  Negative for chest pain, palpitations and leg swelling.   Gastrointestinal:  Negative for abdominal distention, abdominal pain, constipation, diarrhea, nausea and vomiting.   Endocrine: Negative for polyuria.   Genitourinary:  Negative for difficulty urinating, dysuria and hematuria.   Musculoskeletal:  Negative for arthralgias and back pain.   Skin:  Negative for color change and rash.   Neurological:  Negative for dizziness, seizures, syncope, light-headedness and headaches.   Psychiatric/Behavioral:  Negative for confusion.    All other systems reviewed and are negative.          Objective       ED Triage Vitals [01/18/25 0759]   Temperature Pulse Blood Pressure Respirations SpO2 Patient Position - Orthostatic VS   98.3 °F (36.8 °C) (!) 130 (!) 143/106 18 96 % Sitting      Temp Source Heart Rate Source BP Location FiO2 (%) Pain Score    Oral Monitor Left arm -- 8      Vitals      Date and Time Temp Pulse SpO2 Resp BP Pain Score FACES Pain Rating User   01/18/25 1105 -- -- -- -- -- 3 -- AK   01/18/25 1015 -- 104 93 % 30 168/78 -- -- AK   01/18/25 0945 -- 106 95 % 30 -- -- -- AK   01/18/25 0925 -- 112 95 % 32 -- 4 -- AK   01/18/25 0811 98.6 °F (37 °C) 113 97 % 32 175/93 8 -- AK   01/18/25 0759 98.3 °F (36.8 °C) 130 96 % 18 143/106 8 -- DJS            Physical Exam  Vitals and nursing note reviewed.   Constitutional:       General: She is not in acute distress.     Appearance: Normal appearance. She is well-developed. She is not ill-appearing.   HENT:      Head: Normocephalic and atraumatic.      Right Ear: External  ear normal.      Left Ear: External ear normal.      Nose: Nose normal.      Mouth/Throat:      Mouth: Mucous membranes are moist.   Eyes:      General: No scleral icterus.        Right eye: No discharge.      Extraocular Movements: Extraocular movements intact.      Conjunctiva/sclera: Conjunctivae normal.   Cardiovascular:      Rate and Rhythm: Normal rate and regular rhythm.      Pulses: Normal pulses.      Heart sounds: No murmur heard.  Pulmonary:      Effort: Pulmonary effort is normal. No respiratory distress.      Breath sounds: Examination of the right-upper field reveals wheezing. Examination of the left-upper field reveals wheezing. Examination of the right-middle field reveals decreased breath sounds and wheezing. Examination of the left-middle field reveals decreased breath sounds and wheezing. Examination of the right-lower field reveals decreased breath sounds and wheezing. Examination of the left-lower field reveals decreased breath sounds and wheezing. Decreased breath sounds and wheezing present.   Chest:      Chest wall: No tenderness.   Abdominal:      General: Abdomen is flat. There is no distension.      Palpations: Abdomen is soft.      Tenderness: There is no abdominal tenderness.   Musculoskeletal:         General: No swelling, deformity or signs of injury. Normal range of motion.      Cervical back: Normal range of motion and neck supple. No rigidity or tenderness.      Right lower leg: No edema.      Left lower leg: No edema.   Skin:     General: Skin is warm and dry.      Capillary Refill: Capillary refill takes less than 2 seconds.   Neurological:      General: No focal deficit present.      Mental Status: She is alert and oriented to person, place, and time.      Motor: No weakness.   Psychiatric:         Mood and Affect: Mood normal.         Results Reviewed       Procedure Component Value Units Date/Time    Urine Microscopic [[id number]]  (Abnormal) Collected: 01/18/25 0842    Lab  Status: Final result Specimen: Urine, Clean Catch Updated: 01/18/25 0946     RBC, UA 1-2 /hpf      WBC, UA 1-2 /hpf      Epithelial Cells Occasional /hpf      Bacteria, UA None Seen /hpf      MUCUS THREADS Occasional    UA w Reflex to Microscopic w Reflex to Culture [[id number]]  (Abnormal) Collected: 01/18/25 0842    Lab Status: Final result Specimen: Urine, Clean Catch Updated: 01/18/25 0945     Color, UA Yellow     Clarity, UA Clear     Specific Gravity, UA 1.015     pH, UA 6.0     Leukocytes, UA Negative     Nitrite, UA Negative     Protein, UA Trace mg/dl      Glucose, UA Negative mg/dl      Ketones, UA Negative mg/dl      Urobilinogen, UA <2.0 mg/dl      Bilirubin, UA Negative     Occult Blood, UA Trace    Comprehensive metabolic panel [[id number]]  (Abnormal) Collected: 01/18/25 0848    Lab Status: Final result Specimen: Blood from Arm, Left Updated: 01/18/25 0929     Sodium 137 mmol/L      Potassium 3.8 mmol/L      Chloride 103 mmol/L      CO2 25 mmol/L      ANION GAP 9 mmol/L      BUN 8 mg/dL      Creatinine 0.65 mg/dL      Glucose 154 mg/dL      Calcium 9.8 mg/dL      AST 28 U/L      ALT 37 U/L      Alkaline Phosphatase 89 U/L      Total Protein 7.8 g/dL      Albumin 3.8 g/dL      Total Bilirubin 0.89 mg/dL      eGFR 95 ml/min/1.73sq m     Narrative:      National Kidney Disease Foundation guidelines for Chronic Kidney Disease (CKD):     Stage 1 with normal or high GFR (GFR > 90 mL/min/1.73 square meters)    Stage 2 Mild CKD (GFR = 60-89 mL/min/1.73 square meters)    Stage 3A Moderate CKD (GFR = 45-59 mL/min/1.73 square meters)    Stage 3B Moderate CKD (GFR = 30-44 mL/min/1.73 square meters)    Stage 4 Severe CKD (GFR = 15-29 mL/min/1.73 square meters)    Stage 5 End Stage CKD (GFR <15 mL/min/1.73 square meters)  Note: GFR calculation is accurate only with a steady state creatinine    FLU/COVID Rapid Antigen (30 min. TAT) - Preferred screening test in ED [[id number]]  (Normal) Collected: 01/18/25 0849    Lab  Status: Final result Specimen: Nares from Nose Updated: 01/18/25 0923     SARS COV Rapid Antigen Negative     Influenza A Rapid Antigen Negative     Influenza B Rapid Antigen Negative    Narrative:      This test has been performed using the [employer] Karrie 2 FLU+SARS Antigen test under the Emergency Use Authorization (EUA). This test has been validated by the  and verified by the performing laboratory. The Karrie uses lateral flow immunofluorescent sandwich assay to detect SARS-COV, Influenza A and Influenza B Antigen.     The Quidel Karrie 2 SARS Antigen test does not differentiate between SARS-CoV and SARS-CoV-2.     Negative results are presumptive and may be confirmed with a molecular assay, if necessary, for patient management. Negative results do not rule out SARS-CoV-2 or influenza infection and should not be used as the sole basis for treatment or patient management decisions. A negative test result may occur if the level of antigen in a sample is below the limit of detection of this test.     Positive results are indicative of the presence of viral antigens, but do not rule out bacterial infection or co-infection with other viruses.     All test results should be used as an adjunct to clinical observations and other information available to the provider.    FOR PEDIATRIC PATIENTS - copy/paste COVID Guidelines URL to browser: [email or web address]    CBC and differential [[id number]]  (Abnormal) Collected: 01/18/25 0848    Lab Status: Final result Specimen: Blood from Arm, Left Updated: 01/18/25 0904     WBC 12.55 Thousand/uL      RBC 5.63 Million/uL      Hemoglobin 15.2 g/dL      Hematocrit 47.8 %      MCV 85 fL      MCH 27.0 pg      MCHC 31.8 g/dL      RDW 12.6 %      MPV 9.8 fL      Platelets 293 Thousands/uL      nRBC 0 /100 WBCs      Segmented % 83 %      Immature Grans % 1 %      Lymphocytes % 8 %      Monocytes % 6 %      Eosinophils Relative  2 %      Basophils Relative 0 %      Absolute Neutrophils 10.41 Thousands/µL      Absolute Immature Grans 0.06 Thousand/uL      Absolute Lymphocytes 0.94 Thousands/µL      Absolute Monocytes 0.79 Thousand/µL      Eosinophils Absolute 0.30 Thousand/µL      Basophils Absolute 0.05 Thousands/µL             XR chest 2 views   ED Interpretation by Carl Lamas MD (01/18 1125)   Patchy airspace disease left lower lobe, atelectasis versus early infiltrate.      Final Interpretation by Zach Whitney MD (01/18 1141)      No acute cardiopulmonary disease.            Workstation performed: [id number]             ECG 12 Lead Documentation Only    Date/Time: 1/18/2025 11:55 AM    Performed by: Kavon Theodore DO  Authorized by: Kavon Theodore DO    Rate:     ECG rate:  117    ECG rate assessment: tachycardic    Rhythm:     Rhythm: sinus rhythm    QRS:     QRS axis:  Normal  Conduction:     Conduction: normal    ST segments:     ST segments:  Non-specific  T waves:     T waves: non-specific        ED Medication and Procedure Management   Prior to Admission Medications   Prescriptions Last Dose Informant Patient Reported? Taking?   Dulera 100-5 MCG/ACT inhaler   No No   Sig: INHALE 2 PUFFS BY MOUTH 2 TIMES A DAY RINSE MOUTH AFTER USE.   FLUoxetine (PROzac) 10 mg capsule   No No   Sig: TAKE 1 CAPSULE BY MOUTH EVERY DAY   SUMAtriptan (IMITREX) 100 mg tablet   No No   Sig: TAKE 1 TABLET (100 MG TOTAL) BY MOUTH ONCE AS NEEDED FOR MIGRAINE FOR UP TO 1 DOSE**DUE 8/17   Ventolin  (90 Base) MCG/ACT inhaler   No No   Sig: INHALE 2 PUFFS BY MOUTH EVERY 4 HOURS AS NEEDED FOR WHEEZING   albuterol (2.5 mg/3 mL) 0.083 % nebulizer solution  Self No No   Sig: Take 1 vial (2.5 mg total) by nebulization every 4 (four) hours as needed for wheezing or shortness of breath   baclofen 10 mg tablet   No No   Sig: TAKE 1 TABLET (10 MG TOTAL) BY MOUTH DAILY AT BEDTIME AS NEEDED FOR MUSCLE SPASMS   brompheniramine-pseudoephedrine-DM 30-2-10 MG/5ML syrup   Self No No   Sig: Take 10 mL by mouth 4 (four) times a day as needed for allergies   Patient not taking: Reported on 10/5/2022   cetirizine (ZyrTEC) 10 mg tablet   No No   Sig: TAKE 1 TABLET BY MOUTH EVERY DAY   gabapentin (NEURONTIN) 300 mg capsule   No No   Sig: TAKE 1 CAPSULE IN THE MORNING AND NOON AND 2 CAPSULES BEFORE BEDTIME   lidocaine (Lidoderm) 5 %   No No   Sig: Apply 1 patch topically over 12 hours daily for 5 days Remove & Discard patch within 12 hours or as directed by MD   ondansetron (ZOFRAN-ODT) 4 mg disintegrating tablet   No No   Sig: Take 1 tablet (4 mg total) by mouth every 6 (six) hours as needed for nausea or vomiting   zolpidem (AMBIEN) 5 mg tablet  Self No No   Sig: Take 1 tablet (5 mg total) by mouth daily at bedtime as needed for sleep      Facility-Administered Medications: None     Discharge Medication List as of 1/18/2025 11:37 AM        START taking these medications    Details   !! albuterol (2.5 mg/3 mL) 0.083 % nebulizer solution Take 3 mL (2.5 mg total) by nebulization every 6 (six) hours as needed for wheezing or shortness of breath, Starting Sat 1/18/2025, Normal      doxycycline hyclate (VIBRAMYCIN) 100 mg capsule Take 1 capsule (100 mg total) by mouth 2 (two) times a day for 7 days, Starting Sat 1/18/2025, Until Sat 1/25/2025, Normal       !! - Potential duplicate medications found. Please discuss with provider.        CONTINUE these medications which have NOT CHANGED    Details   !! albuterol (2.5 mg/3 mL) 0.083 % nebulizer solution Take 1 vial (2.5 mg total) by nebulization every 4 (four) hours as needed for wheezing or shortness of breath, Starting Fri 10/2/2020, Normal      baclofen 10 mg tablet TAKE 1 TABLET (10 MG TOTAL) BY MOUTH DAILY AT BEDTIME AS NEEDED FOR MUSCLE SPASMS, Starting Mon 6/26/2023, Normal      brompheniramine-pseudoephedrine-DM 30-2-10 MG/5ML syrup Take 10 mL by mouth 4 (four) times a day as needed for allergies, Starting Mon 4/11/2022, Normal       cetirizine (ZyrTEC) 10 mg tablet TAKE 1 TABLET BY MOUTH EVERY DAY, Normal      Dulera 100-5 MCG/ACT inhaler INHALE 2 PUFFS BY MOUTH 2 TIMES A DAY RINSE MOUTH AFTER USE., Normal      FLUoxetine (PROzac) 10 mg capsule TAKE 1 CAPSULE BY MOUTH EVERY DAY, Starting Thu 11/14/2024, Normal      gabapentin (NEURONTIN) 300 mg capsule TAKE 1 CAPSULE IN THE MORNING AND NOON AND 2 CAPSULES BEFORE BEDTIME, Normal      lidocaine (Lidoderm) 5 % Apply 1 patch topically over 12 hours daily for 5 days Remove & Discard patch within 12 hours or as directed by MD, Starting Mon 2/20/2023, Until Sat 2/25/2023, Normal      ondansetron (ZOFRAN-ODT) 4 mg disintegrating tablet Take 1 tablet (4 mg total) by mouth every 6 (six) hours as needed for nausea or vomiting, Starting Sat 8/31/2024, Normal      SUMAtriptan (IMITREX) 100 mg tablet TAKE 1 TABLET (100 MG TOTAL) BY MOUTH ONCE AS NEEDED FOR MIGRAINE FOR UP TO 1 DOSE**DUE 8/17, Normal      Ventolin  (90 Base) MCG/ACT inhaler INHALE 2 PUFFS BY MOUTH EVERY 4 HOURS AS NEEDED FOR WHEEZING, Normal      zolpidem (AMBIEN) 5 mg tablet Take 1 tablet (5 mg total) by mouth daily at bedtime as needed for sleep, Starting Wed 10/5/2022, Normal       !! - Potential duplicate medications found. Please discuss with provider.        No discharge procedures on file.  ED SEPSIS DOCUMENTATION   Time reflects when diagnosis was documented in both MDM as applicable and the Disposition within this note       Time User Action Codes Description Comment    1/18/2025 11:34 AM Kavon Theodore [J44.9] COPD (chronic obstructive pulmonary disease) (HCC)     1/18/2025 11:34 AM Kavon Theodore [J18.9] PNA (pneumonia)                  Kavon Theodore DO  01/23/25 0500

## 2025-01-20 ENCOUNTER — TELEPHONE (OUTPATIENT)
Age: 65
End: 2025-01-20

## 2025-01-20 NOTE — TELEPHONE ENCOUNTER
Pt was seen in the ER on 1/18/25 and dx'd with pneumonia. She still has the chills and bad cough. She was given antibiotics and told to f/u with PCP. I scheduled her for Wed 1/22/25 with Dr. Childers. Can this appt be virtual? Or does this need to be in office? She relies on bus transportation and she is not feeling well. I did not mention a virtual appt to her bc I wasn't sure if that was a possibility, wanted to check with clinical first.    She also wanted to know what else she can do in the mean time to help with symptoms.    Please advise patient.    Thank you

## 2025-01-22 ENCOUNTER — TELEMEDICINE (OUTPATIENT)
Dept: FAMILY MEDICINE CLINIC | Facility: CLINIC | Age: 65
End: 2025-01-22
Payer: COMMERCIAL

## 2025-01-22 DIAGNOSIS — J06.9 VIRAL URI WITH COUGH: Primary | ICD-10-CM

## 2025-01-22 DIAGNOSIS — J44.1 COPD EXACERBATION (HCC): ICD-10-CM

## 2025-01-22 PROCEDURE — 99213 OFFICE O/P EST LOW 20 MIN: CPT | Performed by: FAMILY MEDICINE

## 2025-01-22 RX ORDER — PREDNISONE 10 MG/1
40 TABLET ORAL DAILY
Qty: 20 TABLET | Refills: 0 | Status: SHIPPED | OUTPATIENT
Start: 2025-01-22 | End: 2025-01-27

## 2025-01-22 NOTE — PROGRESS NOTES
Virtual Regular Visit  Name: Minerva Amos      : 1960      MRN: [id number]  Encounter Provider: Nikolai Childers DO  Encounter Date: 2025   Encounter department: [facility]      Verification of patient location:  Patient is located at Home in the following state in which I hold an active license [place] :  Assessment & Plan  Viral URI with cough  -Patient noting ongoing symptoms of cough, congestion, rhinorrhea, shortness of breath, general malaise for almost the past week and for which she presented to the ED for on 2025  - While in the hospital patient was noted to have mild leukocytosis with white blood cell count of 12.55 other labs unremarkable, chest x-ray was also unremarkable, flu/COVID-negative  - Despite the above information findings there is concern patient may have underlying pneumonia with started doxycycline 100 mg twice daily for 7 days  - On follow-up patient notes ongoing symptoms mention prior not improving  - Concern for viral URI with underlying COPD exacerbation    Plan  - Start patient on prednisone 40 mg daily for 5 days  - Patient advised to continue using nebulizer  - Discussed importance of starting Mucinex chronic, Flonase and consideration for kidney humidifier for her bedroom at night  - Patient advised to follow-up in clinic if symptoms not improving after course of antibiotics and steroids       COPD exacerbation (HCC)  -Detailed above  Orders:    predniSONE 10 mg tablet; Take 4 tablets (40 mg total) by mouth daily for 5 days        Encounter provider Nikolai Childers DO    The patient was identified by name and date of birth. Minerva MEMBRENO Bette was informed that this is a telemedicine visit and that the visit is being conducted through the Epic Embedded platform. She agrees to proceed..  My office door was closed. No one else was in the room.  She acknowledged consent and understanding of privacy and security of the video  platform. The patient has agreed to participate and understands they can discontinue the visit at any time.    Patient is aware this is a billable service.     History of Present Illness     Still feeling sick, cough, sneezing, congestion, fever, chills,   Eating and drinking okay   Symptoms starting on the 18th  Currently on Doxycyline       Review of Systems   Constitutional:  Positive for fatigue. Negative for activity change, appetite change, chills and fever.   HENT:  Positive for congestion and rhinorrhea. Negative for sinus pressure and sinus pain.    Respiratory:  Positive for cough and shortness of breath.    Cardiovascular:  Negative for chest pain.   Neurological:  Negative for dizziness, light-headedness and headaches.       Objective   LMP  (LMP Unknown)     Physical Exam  Constitutional:       General: She is not in acute distress.     Appearance: Normal appearance. She is ill-appearing.   HENT:      Head: Normocephalic and atraumatic.   Neurological:      Mental Status: She is alert.         Visit Time  Total Visit Duration: 20 minutes

## 2025-01-24 DIAGNOSIS — J44.1 COPD EXACERBATION (HCC): Primary | ICD-10-CM

## 2025-01-24 RX ORDER — AZITHROMYCIN 500 MG/1
500 TABLET, FILM COATED ORAL DAILY
Qty: 3 TABLET | Refills: 0 | Status: SHIPPED | OUTPATIENT
Start: 2025-01-24 | End: 2025-01-27

## 2025-01-24 RX ORDER — AZITHROMYCIN 500 MG/1
500 TABLET, FILM COATED ORAL DAILY
Qty: 3 TABLET | Refills: 0 | Status: SHIPPED | OUTPATIENT
Start: 2025-01-24 | End: 2025-01-24 | Stop reason: CLARIF

## 2025-01-24 NOTE — TELEPHONE ENCOUNTER
Patient called requesting an alternative antibiotic as the one that was prescribed to her makes her sick. She was also prescribed a nausea medication and she stated that too makes her sick.    Patient is also requesting for the alternative to be sent to   [facility]   [address] [place] [id number] [phone]    Please advise out to patient once prescription is sent to pharmacy or for any further concerns

## 2025-02-25 ENCOUNTER — OFFICE VISIT (OUTPATIENT)
Dept: FAMILY MEDICINE CLINIC | Facility: CLINIC | Age: 65
End: 2025-02-25
Payer: COMMERCIAL

## 2025-02-25 VITALS
SYSTOLIC BLOOD PRESSURE: 136 MMHG | WEIGHT: 228 LBS | RESPIRATION RATE: 18 BRPM | HEART RATE: 87 BPM | OXYGEN SATURATION: 97 % | DIASTOLIC BLOOD PRESSURE: 92 MMHG | BODY MASS INDEX: 43.05 KG/M2 | TEMPERATURE: 97.5 F | HEIGHT: 61 IN

## 2025-02-25 DIAGNOSIS — J44.89 ASTHMA WITH COPD (HCC): Primary | ICD-10-CM

## 2025-02-25 DIAGNOSIS — F17.200 NICOTINE USE DISORDER: ICD-10-CM

## 2025-02-25 PROCEDURE — 99213 OFFICE O/P EST LOW 20 MIN: CPT | Performed by: FAMILY MEDICINE

## 2025-02-25 RX ORDER — ALBUTEROL SULFATE 0.83 MG/ML
2.5 SOLUTION RESPIRATORY (INHALATION) EVERY 6 HOURS PRN
Qty: 60 ML | Refills: 2 | Status: SHIPPED | OUTPATIENT
Start: 2025-02-25

## 2025-02-25 RX ORDER — PREDNISONE 10 MG/1
TABLET ORAL
Qty: 7 TABLET | Refills: 0 | Status: SHIPPED | OUTPATIENT
Start: 2025-02-25 | End: 2025-03-01

## 2025-02-25 NOTE — PROGRESS NOTES
Name: Minerva Amos      : 1960      MRN: [id number]  Encounter Provider: Pino Worthington MD  Encounter Date: 2025   Encounter department: [facility]  :  Assessment & Plan  Asthma with COPD (HCC)  No acute distress.  Vital signs stable.  Discussed with patient.  Patient vies to quit tobacco completely.  And never to return to it ever again.  Patient's albuterol nebulizer solution is refilled as per her request.  Patient is also given a short course of steroids to help her with this acute process.  Patient is ordered a CT of the lung I discussed with the patient and shared decision to do it.  Patient sent for PFT.  And patient is sent to pulmonary.  Though patient is precontemplative right now patient is sent to the tobacco cessation program also.  And patient advised to lose weight.  Orders:  •  albuterol (2.5 mg/3 mL) 0.083 % nebulizer solution; Take 3 mL (2.5 mg total) by nebulization every 6 (six) hours as needed for wheezing or shortness of breath  •  predniSONE 10 mg tablet; Take 2 tablets (20 mg total) by mouth daily for 2 days, THEN 1 tablet (10 mg total) daily for 3 days.  •  Complete PFT with post bronchodilator; Future  •  Ambulatory Referral to Pulmonology; Future    Nicotine use disorder  Precontemplative.  Discussed with patient.  Please see the above discussion for this also.  Orders:  •  CT lung screening program; Future  •  Complete PFT with post bronchodilator; Future  •  Ambulatory Referral to Smoking Cessation Program; Future        RTO for her neck scheduled visit.      Tobacco Cessation Counseling: Tobacco cessation counseling was provided. The patient is sincerely urged to quit consumption of tobacco. She is not ready to quit tobacco. Medication options and side effects of medication discussed. Patient refused medication. Discussed with patient.  Patient precontemplative.  Patient asserts that she can quit whenever she wants.    Lung Cancer  Screening Shared Decision Making: I discussed with her that she is a candidate for lung cancer CT screening.     The following Shared Decision-Making points were covered:  Benefits of screening were discussed, including the rates of reduction in death from lung cancer and other causes.  Harms of screening were reviewed, including false positive tests, radiation exposure levels, risks of invasive procedures, risks of complications of screening, and risk of overdiagnosis.  I counseled on the importance of adherence to annual lung cancer LDCT screening, impact of co-morbidities, and ability or willingness to undergo diagnosis and treatment.  I counseled on the importance of maintaining abstinence as a former smoker or was counseled on the importance of smoking cessation if a current smoker    Review of Eligibility Criteria: She meets all of the criteria for Lung Cancer Screening.   - She is 64 y.o.   - She has 20 pack year tobacco history and is a current smoker or has quit within the past 15 years  - She presents no signs or symptoms of lung cancer    After discussion, the patient decided to elect lung cancer screening.      History of Present Illness   64-year-old female, new patient to me but not the practice, here to be evaluated for her cough and asthma.  Patient is a chronic smoker but has stopped a few weeks now on her own.  Patient says that she lives with someone who does smoke still.  Patient is basically asking for refill on her albuterol nebulizer solution as per patient.  Patient says she does have a phlegm but is white and thin.  She has not had a CT of the lungs secondary to her chronic smoking.      Review of Systems   Constitutional:  Negative for chills, fatigue, fever and unexpected weight change.   HENT:  Negative for congestion and sore throat.    Eyes:  Negative for visual disturbance.   Respiratory:  Positive for cough. Negative for shortness of breath.    Cardiovascular:  Negative for chest  "pain, palpitations and leg swelling.   Gastrointestinal:  Negative for abdominal pain.   Genitourinary:  Negative for dysuria.   Neurological:  Negative for dizziness and headaches.   Psychiatric/Behavioral:  Negative for dysphoric mood. The patient is not nervous/anxious.        Objective   /92 (BP Location: Left arm, Patient Position: Sitting, Cuff Size: Large)   Pulse 87   Temp 97.5 °F (36.4 °C) (Temporal)   Resp 18   Ht 5' 1\" (1.549 m)   Wt 103 kg (228 lb)   LMP  (LMP Unknown)   SpO2 97%   BMI 43.08 kg/m²      Physical Exam  Vitals reviewed.   Constitutional:       General: She is not in acute distress.     Appearance: Normal appearance. She is obese. She is not ill-appearing.   HENT:      Head: Normocephalic and atraumatic.      Right Ear: Tympanic membrane, ear canal and external ear normal.      Left Ear: Tympanic membrane, ear canal and external ear normal.      Mouth/Throat:      Mouth: Mucous membranes are moist.      Pharynx: Oropharynx is clear.   Eyes:      Extraocular Movements: Extraocular movements intact.      Conjunctiva/sclera: Conjunctivae normal.   Neck:      Vascular: No carotid bruit.   Cardiovascular:      Rate and Rhythm: Normal rate and regular rhythm.   Pulmonary:      Effort: Pulmonary effort is normal. No respiratory distress.      Breath sounds: Normal breath sounds. No wheezing or rhonchi.   Musculoskeletal:      Right lower leg: No edema.      Left lower leg: No edema.      Comments: No calf tenderness bilateral.   Skin:     General: Skin is warm.   Neurological:      General: No focal deficit present.      Mental Status: She is alert and oriented to person, place, and time.   Psychiatric:         Mood and Affect: Mood normal.         Behavior: Behavior normal.         Thought Content: Thought content normal.         "

## 2025-02-25 NOTE — ASSESSMENT & PLAN NOTE
No acute distress.  Vital signs stable.  Discussed with patient.  Patient vies to quit tobacco completely.  And never to return to it ever again.  Patient's albuterol nebulizer solution is refilled as per her request.  Patient is also given a short course of steroids to help her with this acute process.  Patient is ordered a CT of the lung I discussed with the patient and shared decision to do it.  Patient sent for PFT.  And patient is sent to pulmonary.  Though patient is precontemplative right now patient is sent to the tobacco cessation program also.  And patient advised to lose weight.  Orders:  •  albuterol (2.5 mg/3 mL) 0.083 % nebulizer solution; Take 3 mL (2.5 mg total) by nebulization every 6 (six) hours as needed for wheezing or shortness of breath  •  predniSONE 10 mg tablet; Take 2 tablets (20 mg total) by mouth daily for 2 days, THEN 1 tablet (10 mg total) daily for 3 days.  •  Complete PFT with post bronchodilator; Future  •  Ambulatory Referral to Pulmonology; Future

## 2025-02-25 NOTE — ASSESSMENT & PLAN NOTE
Precontemplative.  Discussed with patient.  Please see the above discussion for this also.  Orders:  •  CT lung screening program; Future  •  Complete PFT with post bronchodilator; Future  •  Ambulatory Referral to Smoking Cessation Program; Future

## 2025-04-08 DIAGNOSIS — F31.9 BIPOLAR 1 DISORDER (HCC): ICD-10-CM

## 2025-04-09 ENCOUNTER — TELEPHONE (OUTPATIENT)
Age: 65
End: 2025-04-09

## 2025-04-09 RX ORDER — FLUOXETINE 10 MG/1
10 CAPSULE ORAL DAILY
Qty: 90 CAPSULE | Refills: 0 | OUTPATIENT
Start: 2025-04-09

## 2025-04-09 NOTE — TELEPHONE ENCOUNTER
Patient call to ask about the doctor name when did her surgery for an implant on her breast and would like to know what is the name of the doctor. Please assist the patient with this inform. Thank you

## 2025-05-31 ENCOUNTER — NURSE TRIAGE (OUTPATIENT)
Dept: OTHER | Facility: OTHER | Age: 65
End: 2025-05-31

## 2025-05-31 NOTE — TELEPHONE ENCOUNTER
"REASON FOR CONVERSATION: Headache    SYMPTOMS: Headache    OTHER HEALTH INFORMATION: History of migraines     PROTOCOL DISPOSITION: Home Care    CARE ADVICE PROVIDED: Rest, increase fluid intake, cold compresses to forehead, continue medications as needed for pain, call back or present to ED with new/worsening symptoms. Patient verbalized understanding    PRACTICE FOLLOW-UP: Patient would like a call back to schedule an appointment with Neurology in [place]       Reason for Disposition   Similar to previously diagnosed migraine headaches    Answer Assessment - Initial Assessment Questions  1. LOCATION: \"Where does it hurt?\"         All over head, some days on the left, some days on the right    2. ONSET: \"When did the headache start?\" (e.g., minutes, hours, days)         2-3 days ago     3. PATTERN: \"Does the pain come and go, or has it been constant since it started?\"        Constant     4. SEVERITY: \"How bad is the pain?\" and \"What does it keep you from doing?\"  (e.g., Scale 1-10; mild, moderate, or severe)        6-7/10     5. RECURRENT SYMPTOM: \"Have you ever had headaches before?\" If Yes, ask: \"When was the last time?\" and \"What happened that time?\"         Yes, had one really bad like this in the past was taking medications and in the dark room for a few days and pain resolved     6. CAUSE: \"What do you think is causing the headache?\"        Unknown cause     7. MIGRAINE: \"Have you been diagnosed with migraine headaches?\" If Yes, ask: \"Is this headache similar?\"         Yes patient was diagnosed with migraines in the past     8. HEAD INJURY: \"Has there been any recent injury to the head?\"         Denies     9. OTHER SYMPTOMS: \"Do you have any other symptoms?\" (e.g., fever, stiff neck, eye pain, sore throat, cold symptoms)        Does have nausea, some blurry vision and sensitivity to light, denies chest pain or SOB      Excederin, sumatriptan injection, advil, motrin     Has sumatriptan injections unknown " who prescribed    Protocols used: Headache-Adult-AH